# Patient Record
Sex: MALE | Race: OTHER | Employment: OTHER | URBAN - METROPOLITAN AREA
[De-identification: names, ages, dates, MRNs, and addresses within clinical notes are randomized per-mention and may not be internally consistent; named-entity substitution may affect disease eponyms.]

---

## 2017-01-23 ENCOUNTER — OFFICE VISIT (OUTPATIENT)
Dept: UROLOGY | Age: 72
End: 2017-01-23

## 2017-01-23 VITALS
WEIGHT: 190 LBS | DIASTOLIC BLOOD PRESSURE: 90 MMHG | HEART RATE: 77 BPM | HEIGHT: 68 IN | SYSTOLIC BLOOD PRESSURE: 180 MMHG | BODY MASS INDEX: 28.79 KG/M2

## 2017-01-23 DIAGNOSIS — R97.20 RISING PSA LEVEL: Primary | ICD-10-CM

## 2017-01-23 PROCEDURE — 99214 OFFICE O/P EST MOD 30 MIN: CPT | Performed by: UROLOGY

## 2017-02-06 ENCOUNTER — HOSPITAL ENCOUNTER (OUTPATIENT)
Dept: PREADMISSION TESTING | Age: 72
Discharge: HOME | End: 2017-02-06
Payer: COMMERCIAL

## 2017-02-06 VITALS
SYSTOLIC BLOOD PRESSURE: 166 MMHG | HEART RATE: 81 BPM | TEMPERATURE: 98.3 F | OXYGEN SATURATION: 97 % | WEIGHT: 193.6 LBS | RESPIRATION RATE: 16 BRPM | BODY MASS INDEX: 31.12 KG/M2 | DIASTOLIC BLOOD PRESSURE: 83 MMHG | HEIGHT: 66 IN

## 2017-02-06 DIAGNOSIS — R97.20 ELEVATED PSA: Chronic | ICD-10-CM

## 2017-02-06 LAB
ANION GAP SERPL CALCULATED.3IONS-SCNC: 13 MEQ/L (ref 7–13)
BUN BLDV-MCNC: 26 MG/DL (ref 8–23)
CALCIUM SERPL-MCNC: 9.3 MG/DL (ref 8.6–10.2)
CHLORIDE BLD-SCNC: 102 MEQ/L (ref 98–107)
CO2: 21 MEQ/L (ref 22–29)
CREAT SERPL-MCNC: 0.98 MG/DL (ref 0.7–1.2)
GFR AFRICAN AMERICAN: >60
GFR NON-AFRICAN AMERICAN: >60
GLUCOSE BLD-MCNC: 194 MG/DL (ref 74–109)
HCT VFR BLD CALC: 41 % (ref 42–52)
HEMOGLOBIN: 13.6 G/DL (ref 14–18)
MCH RBC QN AUTO: 30 PG (ref 27–31.3)
MCHC RBC AUTO-ENTMCNC: 33.3 % (ref 33–37)
MCV RBC AUTO: 90.1 FL (ref 80–100)
PDW BLD-RTO: 13.4 % (ref 11.5–14.5)
PLATELET # BLD: 134 K/UL (ref 130–400)
POTASSIUM SERPL-SCNC: 5.1 MEQ/L (ref 3.5–5.1)
RBC # BLD: 4.55 M/UL (ref 4.7–6.1)
SODIUM BLD-SCNC: 136 MEQ/L (ref 132–144)
WBC # BLD: 5.9 K/UL (ref 4.8–10.8)

## 2017-02-06 PROCEDURE — 80048 BASIC METABOLIC PNL TOTAL CA: CPT

## 2017-02-06 PROCEDURE — 93005 ELECTROCARDIOGRAM TRACING: CPT

## 2017-02-06 PROCEDURE — 85027 COMPLETE CBC AUTOMATED: CPT

## 2017-02-06 RX ORDER — SODIUM CHLORIDE 0.9 % (FLUSH) 0.9 %
10 SYRINGE (ML) INJECTION PRN
Status: CANCELLED | OUTPATIENT
Start: 2017-02-06

## 2017-02-06 RX ORDER — SODIUM CHLORIDE, SODIUM LACTATE, POTASSIUM CHLORIDE, CALCIUM CHLORIDE 600; 310; 30; 20 MG/100ML; MG/100ML; MG/100ML; MG/100ML
INJECTION, SOLUTION INTRAVENOUS CONTINUOUS
Status: CANCELLED | OUTPATIENT
Start: 2017-02-06

## 2017-02-06 RX ORDER — SODIUM CHLORIDE 0.9 % (FLUSH) 0.9 %
10 SYRINGE (ML) INJECTION EVERY 12 HOURS SCHEDULED
Status: CANCELLED | OUTPATIENT
Start: 2017-02-06

## 2017-02-06 RX ORDER — LIDOCAINE HYDROCHLORIDE 10 MG/ML
1 INJECTION, SOLUTION EPIDURAL; INFILTRATION; INTRACAUDAL; PERINEURAL
Status: CANCELLED | OUTPATIENT
Start: 2017-02-06 | End: 2017-02-06

## 2017-02-07 ENCOUNTER — ANESTHESIA EVENT (OUTPATIENT)
Dept: OPERATING ROOM | Age: 72
End: 2017-02-07
Payer: COMMERCIAL

## 2017-02-08 ENCOUNTER — HOSPITAL ENCOUNTER (OUTPATIENT)
Age: 72
Setting detail: OUTPATIENT SURGERY
Discharge: HOME | End: 2017-02-08
Attending: UROLOGY | Admitting: UROLOGY
Payer: COMMERCIAL

## 2017-02-08 ENCOUNTER — HOSPITAL ENCOUNTER (OUTPATIENT)
Dept: ULTRASOUND IMAGING | Age: 72
Discharge: HOME | End: 2017-02-08
Attending: UROLOGY
Payer: COMMERCIAL

## 2017-02-08 ENCOUNTER — ANESTHESIA (OUTPATIENT)
Dept: OPERATING ROOM | Age: 72
End: 2017-02-08
Payer: COMMERCIAL

## 2017-02-08 ENCOUNTER — SURGERY (OUTPATIENT)
Age: 72
End: 2017-02-08

## 2017-02-08 VITALS
RESPIRATION RATE: 17 BRPM | DIASTOLIC BLOOD PRESSURE: 58 MMHG | OXYGEN SATURATION: 100 % | SYSTOLIC BLOOD PRESSURE: 100 MMHG

## 2017-02-08 VITALS
TEMPERATURE: 98 F | OXYGEN SATURATION: 99 % | SYSTOLIC BLOOD PRESSURE: 134 MMHG | HEART RATE: 73 BPM | DIASTOLIC BLOOD PRESSURE: 93 MMHG | RESPIRATION RATE: 16 BRPM

## 2017-02-08 DIAGNOSIS — R97.20 ELEVATED PSA: ICD-10-CM

## 2017-02-08 LAB
GLUCOSE BLD-MCNC: 173 MG/DL (ref 60–115)
GLUCOSE BLD-MCNC: 173 MG/DL (ref 60–115)
PERFORMED ON: ABNORMAL
PERFORMED ON: ABNORMAL

## 2017-02-08 PROCEDURE — 6360000002 HC RX W HCPCS: Performed by: UROLOGY

## 2017-02-08 PROCEDURE — 76942 ECHO GUIDE FOR BIOPSY: CPT | Performed by: UROLOGY

## 2017-02-08 PROCEDURE — 2500000003 HC RX 250 WO HCPCS: Performed by: NURSE ANESTHETIST, CERTIFIED REGISTERED

## 2017-02-08 PROCEDURE — 2500000003 HC RX 250 WO HCPCS: Performed by: STUDENT IN AN ORGANIZED HEALTH CARE EDUCATION/TRAINING PROGRAM

## 2017-02-08 PROCEDURE — 3600000012 HC SURGERY LEVEL 2 ADDTL 15MIN: Performed by: UROLOGY

## 2017-02-08 PROCEDURE — 3700000000 HC ANESTHESIA ATTENDED CARE: Performed by: UROLOGY

## 2017-02-08 PROCEDURE — 6360000002 HC RX W HCPCS: Performed by: NURSE ANESTHETIST, CERTIFIED REGISTERED

## 2017-02-08 PROCEDURE — 88305 TISSUE EXAM BY PATHOLOGIST: CPT

## 2017-02-08 PROCEDURE — 3600000002 HC SURGERY LEVEL 2 BASE: Performed by: UROLOGY

## 2017-02-08 PROCEDURE — 2580000003 HC RX 258: Performed by: STUDENT IN AN ORGANIZED HEALTH CARE EDUCATION/TRAINING PROGRAM

## 2017-02-08 PROCEDURE — 76872 US TRANSRECTAL: CPT

## 2017-02-08 PROCEDURE — 7100000010 HC PHASE II RECOVERY - FIRST 15 MIN: Performed by: UROLOGY

## 2017-02-08 PROCEDURE — 7100000000 HC PACU RECOVERY - FIRST 15 MIN: Performed by: UROLOGY

## 2017-02-08 PROCEDURE — 7100000011 HC PHASE II RECOVERY - ADDTL 15 MIN: Performed by: UROLOGY

## 2017-02-08 PROCEDURE — 55700 US PROSTATE NEEDLE PUNCH: CPT

## 2017-02-08 PROCEDURE — 3700000001 HC ADD 15 MINUTES (ANESTHESIA): Performed by: UROLOGY

## 2017-02-08 PROCEDURE — 55700 PR BIOPSY OF PROSTATE,NEEDLE/PUNCH: CPT | Performed by: UROLOGY

## 2017-02-08 PROCEDURE — 2580000003 HC RX 258: Performed by: UROLOGY

## 2017-02-08 PROCEDURE — 76872 US TRANSRECTAL: CPT | Performed by: UROLOGY

## 2017-02-08 RX ORDER — PROPOFOL 10 MG/ML
INJECTION, EMULSION INTRAVENOUS CONTINUOUS PRN
Status: DISCONTINUED | OUTPATIENT
Start: 2017-02-08 | End: 2017-02-08 | Stop reason: SDUPTHER

## 2017-02-08 RX ORDER — ONDANSETRON 2 MG/ML
4 INJECTION INTRAMUSCULAR; INTRAVENOUS
Status: DISCONTINUED | OUTPATIENT
Start: 2017-02-08 | End: 2017-02-08 | Stop reason: HOSPADM

## 2017-02-08 RX ORDER — SODIUM CHLORIDE 0.9 % (FLUSH) 0.9 %
10 SYRINGE (ML) INJECTION EVERY 12 HOURS SCHEDULED
Status: DISCONTINUED | OUTPATIENT
Start: 2017-02-08 | End: 2017-02-08 | Stop reason: HOSPADM

## 2017-02-08 RX ORDER — LIDOCAINE HYDROCHLORIDE 10 MG/ML
1 INJECTION, SOLUTION EPIDURAL; INFILTRATION; INTRACAUDAL; PERINEURAL
Status: COMPLETED | OUTPATIENT
Start: 2017-02-08 | End: 2017-02-08

## 2017-02-08 RX ORDER — PROPOFOL 10 MG/ML
INJECTION, EMULSION INTRAVENOUS PRN
Status: DISCONTINUED | OUTPATIENT
Start: 2017-02-08 | End: 2017-02-08 | Stop reason: SDUPTHER

## 2017-02-08 RX ORDER — SODIUM CHLORIDE, SODIUM LACTATE, POTASSIUM CHLORIDE, CALCIUM CHLORIDE 600; 310; 30; 20 MG/100ML; MG/100ML; MG/100ML; MG/100ML
INJECTION, SOLUTION INTRAVENOUS CONTINUOUS
Status: DISCONTINUED | OUTPATIENT
Start: 2017-02-08 | End: 2017-02-08 | Stop reason: HOSPADM

## 2017-02-08 RX ORDER — FENTANYL CITRATE 50 UG/ML
50 INJECTION, SOLUTION INTRAMUSCULAR; INTRAVENOUS EVERY 5 MIN PRN
Status: DISCONTINUED | OUTPATIENT
Start: 2017-02-08 | End: 2017-02-08 | Stop reason: HOSPADM

## 2017-02-08 RX ORDER — SODIUM CHLORIDE 0.9 % (FLUSH) 0.9 %
10 SYRINGE (ML) INJECTION PRN
Status: DISCONTINUED | OUTPATIENT
Start: 2017-02-08 | End: 2017-02-08 | Stop reason: HOSPADM

## 2017-02-08 RX ORDER — FENTANYL CITRATE 50 UG/ML
INJECTION, SOLUTION INTRAMUSCULAR; INTRAVENOUS PRN
Status: DISCONTINUED | OUTPATIENT
Start: 2017-02-08 | End: 2017-02-08 | Stop reason: SDUPTHER

## 2017-02-08 RX ORDER — LIDOCAINE HYDROCHLORIDE 20 MG/ML
INJECTION, SOLUTION EPIDURAL; INFILTRATION; INTRACAUDAL; PERINEURAL PRN
Status: DISCONTINUED | OUTPATIENT
Start: 2017-02-08 | End: 2017-02-08 | Stop reason: SDUPTHER

## 2017-02-08 RX ORDER — PROMETHAZINE HYDROCHLORIDE 25 MG/ML
25 INJECTION, SOLUTION INTRAMUSCULAR; INTRAVENOUS
Status: DISCONTINUED | OUTPATIENT
Start: 2017-02-08 | End: 2017-02-08 | Stop reason: HOSPADM

## 2017-02-08 RX ADMIN — FENTANYL CITRATE 25 MCG: 50 INJECTION, SOLUTION INTRAMUSCULAR; INTRAVENOUS at 08:25

## 2017-02-08 RX ADMIN — PROPOFOL 100 MCG/KG/MIN: 10 INJECTION, EMULSION INTRAVENOUS at 08:30

## 2017-02-08 RX ADMIN — GENTAMICIN SULFATE 120 MG: 40 INJECTION, SOLUTION INTRAMUSCULAR; INTRAVENOUS at 07:58

## 2017-02-08 RX ADMIN — FENTANYL CITRATE 25 MCG: 50 INJECTION, SOLUTION INTRAMUSCULAR; INTRAVENOUS at 08:29

## 2017-02-08 RX ADMIN — PROPOFOL 20 MG: 10 INJECTION, EMULSION INTRAVENOUS at 08:32

## 2017-02-08 RX ADMIN — PROPOFOL 140 MG: 10 INJECTION, EMULSION INTRAVENOUS at 08:29

## 2017-02-08 RX ADMIN — SODIUM CHLORIDE, POTASSIUM CHLORIDE, SODIUM LACTATE AND CALCIUM CHLORIDE: 600; 310; 30; 20 INJECTION, SOLUTION INTRAVENOUS at 07:58

## 2017-02-08 RX ADMIN — LIDOCAINE HYDROCHLORIDE 60 MG: 20 INJECTION, SOLUTION EPIDURAL; INFILTRATION; INTRACAUDAL; PERINEURAL at 08:29

## 2017-02-08 RX ADMIN — PROPOFOL 20 MG: 10 INJECTION, EMULSION INTRAVENOUS at 08:30

## 2017-02-08 RX ADMIN — LIDOCAINE HYDROCHLORIDE 0.1 ML: 10 INJECTION, SOLUTION EPIDURAL; INFILTRATION; INTRACAUDAL; PERINEURAL at 07:57

## 2017-02-08 ASSESSMENT — PAIN - FUNCTIONAL ASSESSMENT: PAIN_FUNCTIONAL_ASSESSMENT: 0-10

## 2017-02-08 ASSESSMENT — PAIN DESCRIPTION - PAIN TYPE
TYPE: SURGICAL PAIN
TYPE: SURGICAL PAIN

## 2017-02-08 ASSESSMENT — PAIN SCALES - GENERAL
PAINLEVEL_OUTOF10: 0
PAINLEVEL_OUTOF10: 2
PAINLEVEL_OUTOF10: 2
PAINLEVEL_OUTOF10: 0

## 2017-02-08 ASSESSMENT — PAIN DESCRIPTION - LOCATION
LOCATION: RECTUM
LOCATION: RECTUM

## 2017-02-08 ASSESSMENT — ENCOUNTER SYMPTOMS: STRIDOR: 0

## 2017-02-09 LAB
EKG ATRIAL RATE: 67 BPM
EKG P AXIS: 59 DEGREES
EKG P-R INTERVAL: 166 MS
EKG Q-T INTERVAL: 392 MS
EKG QRS DURATION: 96 MS
EKG QTC CALCULATION (BAZETT): 414 MS
EKG R AXIS: -10 DEGREES
EKG T AXIS: 28 DEGREES
EKG VENTRICULAR RATE: 67 BPM

## 2017-02-10 DIAGNOSIS — E11.69 TYPE 2 DIABETES MELLITUS WITH OTHER SPECIFIED COMPLICATION (HCC): ICD-10-CM

## 2017-02-10 LAB
ANION GAP SERPL CALCULATED.3IONS-SCNC: 10 MEQ/L (ref 7–13)
BUN BLDV-MCNC: 19 MG/DL (ref 8–23)
CALCIUM SERPL-MCNC: 9.4 MG/DL (ref 8.6–10.2)
CHLORIDE BLD-SCNC: 105 MEQ/L (ref 98–107)
CO2: 26 MEQ/L (ref 22–29)
CREAT SERPL-MCNC: 0.99 MG/DL (ref 0.7–1.2)
CREATININE URINE: 149.1 MG/DL
GFR AFRICAN AMERICAN: >60
GFR NON-AFRICAN AMERICAN: >60
GLUCOSE BLD-MCNC: 156 MG/DL (ref 74–109)
HBA1C MFR BLD: 8.5 % (ref 4.8–5.9)
MICROALBUMIN UR-MCNC: 9 MG/DL
MICROALBUMIN/CREAT UR-RTO: 60.4 MG/G (ref 0–30)
POTASSIUM SERPL-SCNC: 4.8 MEQ/L (ref 3.5–5.1)
SODIUM BLD-SCNC: 141 MEQ/L (ref 132–144)

## 2017-02-17 ENCOUNTER — OFFICE VISIT (OUTPATIENT)
Dept: SURGERY | Age: 72
End: 2017-02-17

## 2017-02-17 VITALS
DIASTOLIC BLOOD PRESSURE: 66 MMHG | SYSTOLIC BLOOD PRESSURE: 136 MMHG | WEIGHT: 195 LBS | BODY MASS INDEX: 31.34 KG/M2 | HEIGHT: 66 IN | HEART RATE: 76 BPM

## 2017-02-17 PROCEDURE — 99213 OFFICE O/P EST LOW 20 MIN: CPT | Performed by: INTERNAL MEDICINE

## 2017-02-20 ENCOUNTER — OFFICE VISIT (OUTPATIENT)
Dept: UROLOGY | Age: 72
End: 2017-02-20

## 2017-02-20 VITALS
DIASTOLIC BLOOD PRESSURE: 86 MMHG | HEART RATE: 82 BPM | WEIGHT: 193 LBS | BODY MASS INDEX: 29.25 KG/M2 | HEIGHT: 68 IN | SYSTOLIC BLOOD PRESSURE: 142 MMHG

## 2017-02-20 DIAGNOSIS — C61 PROSTATE CANCER (HCC): Primary | ICD-10-CM

## 2017-02-20 PROCEDURE — 99214 OFFICE O/P EST MOD 30 MIN: CPT | Performed by: UROLOGY

## 2017-02-21 ENCOUNTER — TELEPHONE (OUTPATIENT)
Dept: UROLOGY | Age: 72
End: 2017-02-21

## 2017-03-02 ENCOUNTER — HOSPITAL ENCOUNTER (OUTPATIENT)
Dept: NUCLEAR MEDICINE | Age: 72
Discharge: HOME | End: 2017-03-02
Payer: COMMERCIAL

## 2017-03-02 ENCOUNTER — HOSPITAL ENCOUNTER (OUTPATIENT)
Dept: CT IMAGING | Age: 72
Discharge: HOME | End: 2017-03-02
Payer: COMMERCIAL

## 2017-03-02 ENCOUNTER — OFFICE VISIT (OUTPATIENT)
Dept: UROLOGY | Age: 72
End: 2017-03-02

## 2017-03-02 VITALS
HEART RATE: 67 BPM | SYSTOLIC BLOOD PRESSURE: 160 MMHG | BODY MASS INDEX: 29.82 KG/M2 | WEIGHT: 190 LBS | DIASTOLIC BLOOD PRESSURE: 90 MMHG | HEIGHT: 67 IN

## 2017-03-02 VITALS — DIASTOLIC BLOOD PRESSURE: 84 MMHG | HEART RATE: 74 BPM | SYSTOLIC BLOOD PRESSURE: 159 MMHG | RESPIRATION RATE: 16 BRPM

## 2017-03-02 DIAGNOSIS — C61 PROSTATE CANCER (HCC): Primary | ICD-10-CM

## 2017-03-02 DIAGNOSIS — C61 PROSTATE CANCER (HCC): ICD-10-CM

## 2017-03-02 PROCEDURE — 6360000004 HC RX CONTRAST MEDICATION: Performed by: UROLOGY

## 2017-03-02 PROCEDURE — 3430000000 HC RX DIAGNOSTIC RADIOPHARMACEUTICAL: Performed by: UROLOGY

## 2017-03-02 PROCEDURE — A9503 TC99M MEDRONATE: HCPCS | Performed by: UROLOGY

## 2017-03-02 PROCEDURE — 78306 BONE IMAGING WHOLE BODY: CPT

## 2017-03-02 PROCEDURE — 74178 CT ABD&PLV WO CNTR FLWD CNTR: CPT

## 2017-03-02 PROCEDURE — 99213 OFFICE O/P EST LOW 20 MIN: CPT | Performed by: UROLOGY

## 2017-03-02 RX ORDER — SODIUM CHLORIDE 0.9 % (FLUSH) 0.9 %
10 SYRINGE (ML) INJECTION PRN
Status: DISCONTINUED | OUTPATIENT
Start: 2017-03-02 | End: 2017-03-05 | Stop reason: HOSPADM

## 2017-03-02 RX ORDER — TC 99M MEDRONATE 20 MG/10ML
25 INJECTION, POWDER, LYOPHILIZED, FOR SOLUTION INTRAVENOUS
Status: COMPLETED | OUTPATIENT
Start: 2017-03-02 | End: 2017-03-02

## 2017-03-02 RX ADMIN — IOPAMIDOL 100 ML: 755 INJECTION, SOLUTION INTRAVENOUS at 08:30

## 2017-03-02 RX ADMIN — Medication 29 MILLICURIE: at 08:45

## 2017-03-04 ENCOUNTER — HOSPITAL ENCOUNTER (OUTPATIENT)
Dept: LAB | Age: 72
Discharge: HOME | End: 2017-03-04
Payer: COMMERCIAL

## 2017-03-04 LAB
GFR AFRICAN AMERICAN: >60
GFR NON-AFRICAN AMERICAN: >60
PERFORMED ON: NORMAL
POC CREATININE: 1 MG/DL (ref 0.8–1.3)
POC SAMPLE TYPE: NORMAL

## 2017-03-06 ENCOUNTER — OFFICE VISIT (OUTPATIENT)
Dept: UROLOGY | Age: 72
End: 2017-03-06

## 2017-03-06 VITALS
HEIGHT: 67 IN | BODY MASS INDEX: 29.82 KG/M2 | WEIGHT: 190 LBS | SYSTOLIC BLOOD PRESSURE: 171 MMHG | HEART RATE: 79 BPM | DIASTOLIC BLOOD PRESSURE: 90 MMHG

## 2017-03-06 DIAGNOSIS — C61 PROSTATE CANCER (HCC): Primary | ICD-10-CM

## 2017-03-06 PROCEDURE — 99213 OFFICE O/P EST LOW 20 MIN: CPT | Performed by: UROLOGY

## 2017-03-23 ENCOUNTER — HOSPITAL ENCOUNTER (OUTPATIENT)
Dept: RADIATION ONCOLOGY | Age: 72
Discharge: HOME | End: 2017-03-23
Payer: COMMERCIAL

## 2017-03-23 VITALS
DIASTOLIC BLOOD PRESSURE: 84 MMHG | OXYGEN SATURATION: 97 % | HEIGHT: 66 IN | TEMPERATURE: 97.7 F | SYSTOLIC BLOOD PRESSURE: 179 MMHG | RESPIRATION RATE: 16 BRPM | BODY MASS INDEX: 31.5 KG/M2 | HEART RATE: 64 BPM | WEIGHT: 196 LBS

## 2017-03-23 PROCEDURE — 99213 OFFICE O/P EST LOW 20 MIN: CPT | Performed by: RADIOLOGY

## 2017-03-27 ENCOUNTER — OFFICE VISIT (OUTPATIENT)
Dept: UROLOGY | Age: 72
End: 2017-03-27

## 2017-03-27 VITALS
HEART RATE: 72 BPM | BODY MASS INDEX: 30.76 KG/M2 | WEIGHT: 196 LBS | DIASTOLIC BLOOD PRESSURE: 85 MMHG | HEIGHT: 67 IN | SYSTOLIC BLOOD PRESSURE: 140 MMHG

## 2017-03-27 DIAGNOSIS — C61 PROSTATE CA (HCC): Primary | ICD-10-CM

## 2017-03-27 PROCEDURE — 99214 OFFICE O/P EST MOD 30 MIN: CPT | Performed by: UROLOGY

## 2017-03-27 PROCEDURE — 96402 CHEMO HORMON ANTINEOPL SQ/IM: CPT | Performed by: UROLOGY

## 2017-04-28 ENCOUNTER — HOSPITAL ENCOUNTER (OUTPATIENT)
Dept: RADIATION ONCOLOGY | Age: 72
Discharge: HOME | End: 2017-04-28
Payer: COMMERCIAL

## 2017-04-28 PROCEDURE — 49411 INS MARK ABD/PEL FOR RT PERQ: CPT | Performed by: RADIOLOGY

## 2017-04-28 PROCEDURE — A4648 IMPLANTABLE TISSUE MARKER: HCPCS

## 2017-04-28 PROCEDURE — 99213 OFFICE O/P EST LOW 20 MIN: CPT | Performed by: RADIOLOGY

## 2017-04-28 RX ORDER — CIPROFLOXACIN 500 MG/1
500 TABLET, FILM COATED ORAL 2 TIMES DAILY
COMMUNITY
Start: 2017-04-28 | End: 2017-05-05

## 2017-05-09 ENCOUNTER — HOSPITAL ENCOUNTER (OUTPATIENT)
Dept: RADIATION ONCOLOGY | Age: 72
Discharge: HOME | End: 2017-05-09
Payer: COMMERCIAL

## 2017-05-09 ENCOUNTER — HOSPITAL ENCOUNTER (OUTPATIENT)
Dept: MRI IMAGING | Age: 72
Discharge: HOME | End: 2017-05-09
Payer: COMMERCIAL

## 2017-05-09 VITALS — HEIGHT: 70 IN | WEIGHT: 197 LBS | BODY MASS INDEX: 28.2 KG/M2

## 2017-05-09 DIAGNOSIS — C61 PROSTATE CANCER (HCC): ICD-10-CM

## 2017-05-09 PROCEDURE — 72197 MRI PELVIS W/O & W/DYE: CPT

## 2017-05-09 PROCEDURE — 6360000004 HC RX CONTRAST MEDICATION: Performed by: RADIOLOGY

## 2017-05-09 PROCEDURE — 77290 THER RAD SIMULAJ FIELD CPLX: CPT | Performed by: RADIOLOGY

## 2017-05-09 PROCEDURE — 77334 RADIATION TREATMENT AID(S): CPT | Performed by: RADIOLOGY

## 2017-05-09 PROCEDURE — A9579 GAD-BASE MR CONTRAST NOS,1ML: HCPCS | Performed by: RADIOLOGY

## 2017-05-09 PROCEDURE — 99214 OFFICE O/P EST MOD 30 MIN: CPT | Performed by: RADIOLOGY

## 2017-05-09 RX ADMIN — GADOVERSETAMIDE 20 ML: 0.5 INJECTION, SOLUTION INTRAVENOUS at 12:52

## 2017-05-10 PROCEDURE — 77399 UNLISTED PX MED RADJ PHYSICS: CPT | Performed by: RADIOLOGY

## 2017-05-11 DIAGNOSIS — C61 PROSTATE CA (HCC): ICD-10-CM

## 2017-05-11 LAB
ANION GAP SERPL CALCULATED.3IONS-SCNC: 14 MEQ/L (ref 7–13)
BUN BLDV-MCNC: 24 MG/DL (ref 8–23)
CALCIUM SERPL-MCNC: 9.5 MG/DL (ref 8.6–10.2)
CHLORIDE BLD-SCNC: 99 MEQ/L (ref 98–107)
CO2: 24 MEQ/L (ref 22–29)
CREAT SERPL-MCNC: 0.91 MG/DL (ref 0.7–1.2)
CREATININE URINE: 58.1 MG/DL
GFR AFRICAN AMERICAN: >60
GFR NON-AFRICAN AMERICAN: >60
GLUCOSE BLD-MCNC: 98 MG/DL (ref 74–109)
HBA1C MFR BLD: 8.2 % (ref 4.8–5.9)
MICROALBUMIN UR-MCNC: 6 MG/DL
MICROALBUMIN/CREAT UR-RTO: 103.3 MG/G (ref 0–30)
POTASSIUM SERPL-SCNC: 4.5 MEQ/L (ref 3.5–5.1)
PROSTATE SPECIFIC ANTIGEN: 0.77 NG/ML (ref 0–6.22)
SODIUM BLD-SCNC: 137 MEQ/L (ref 132–144)

## 2017-05-18 ENCOUNTER — HOSPITAL ENCOUNTER (OUTPATIENT)
Dept: RADIATION ONCOLOGY | Age: 72
Discharge: HOME | End: 2017-05-18
Payer: COMMERCIAL

## 2017-05-18 PROCEDURE — 77338 DESIGN MLC DEVICE FOR IMRT: CPT | Performed by: RADIOLOGY

## 2017-05-18 PROCEDURE — 77300 RADIATION THERAPY DOSE PLAN: CPT | Performed by: RADIOLOGY

## 2017-05-18 PROCEDURE — 77301 RADIOTHERAPY DOSE PLAN IMRT: CPT | Performed by: RADIOLOGY

## 2017-05-22 ENCOUNTER — HOSPITAL ENCOUNTER (OUTPATIENT)
Dept: RADIATION ONCOLOGY | Age: 72
Discharge: HOME | End: 2017-05-22
Payer: COMMERCIAL

## 2017-05-23 PROCEDURE — 77387 GUIDANCE FOR RADJ TX DLVR: CPT | Performed by: RADIOLOGY

## 2017-05-23 PROCEDURE — 77280 THER RAD SIMULAJ FIELD SMPL: CPT | Performed by: RADIOLOGY

## 2017-05-24 PROCEDURE — 77385 HC NTSTY MODUL RAD TX DLVR SMPL: CPT | Performed by: RADIOLOGY

## 2017-05-25 ENCOUNTER — OFFICE VISIT (OUTPATIENT)
Dept: SURGERY | Age: 72
End: 2017-05-25

## 2017-05-25 VITALS
WEIGHT: 194 LBS | DIASTOLIC BLOOD PRESSURE: 94 MMHG | HEIGHT: 66 IN | SYSTOLIC BLOOD PRESSURE: 183 MMHG | HEART RATE: 83 BPM | BODY MASS INDEX: 31.18 KG/M2

## 2017-05-25 LAB — GLUCOSE BLD-MCNC: 127 MG/DL

## 2017-05-25 PROCEDURE — 99213 OFFICE O/P EST LOW 20 MIN: CPT | Performed by: INTERNAL MEDICINE

## 2017-05-25 PROCEDURE — 82962 GLUCOSE BLOOD TEST: CPT | Performed by: INTERNAL MEDICINE

## 2017-05-25 PROCEDURE — 77385 HC NTSTY MODUL RAD TX DLVR SMPL: CPT | Performed by: RADIOLOGY

## 2017-05-25 RX ORDER — SIMVASTATIN 40 MG
40 TABLET ORAL NIGHTLY
Qty: 90 TABLET | Refills: 3 | Status: SHIPPED | OUTPATIENT
Start: 2017-05-25 | End: 2018-05-29 | Stop reason: SDUPTHER

## 2017-05-25 RX ORDER — BICALUTAMIDE 50 MG/1
50 TABLET, FILM COATED ORAL DAILY
Qty: 90 TABLET | Refills: 3
Start: 2017-05-25 | End: 2017-10-18

## 2017-05-26 PROCEDURE — 77385 HC NTSTY MODUL RAD TX DLVR SMPL: CPT | Performed by: RADIOLOGY

## 2017-05-30 ENCOUNTER — HOSPITAL ENCOUNTER (OUTPATIENT)
Dept: RADIATION ONCOLOGY | Age: 72
Discharge: HOME | End: 2017-05-30
Payer: COMMERCIAL

## 2017-05-30 PROCEDURE — 99212 OFFICE O/P EST SF 10 MIN: CPT | Performed by: RADIOLOGY

## 2017-05-30 PROCEDURE — 77385 HC NTSTY MODUL RAD TX DLVR SMPL: CPT | Performed by: RADIOLOGY

## 2017-05-31 PROCEDURE — 77385 HC NTSTY MODUL RAD TX DLVR SMPL: CPT | Performed by: RADIOLOGY

## 2017-05-31 PROCEDURE — 77336 RADIATION PHYSICS CONSULT: CPT | Performed by: RADIOLOGY

## 2017-06-01 PROCEDURE — 77385 HC NTSTY MODUL RAD TX DLVR SMPL: CPT | Performed by: RADIOLOGY

## 2017-06-02 PROCEDURE — 77385 HC NTSTY MODUL RAD TX DLVR SMPL: CPT | Performed by: RADIOLOGY

## 2017-06-05 ENCOUNTER — HOSPITAL ENCOUNTER (OUTPATIENT)
Dept: RADIATION ONCOLOGY | Age: 72
Discharge: HOME | End: 2017-06-05
Payer: COMMERCIAL

## 2017-06-05 PROCEDURE — 99212 OFFICE O/P EST SF 10 MIN: CPT | Performed by: RADIOLOGY

## 2017-06-05 PROCEDURE — 77385 HC NTSTY MODUL RAD TX DLVR SMPL: CPT | Performed by: RADIOLOGY

## 2017-06-05 RX ORDER — BLOOD-GLUCOSE METER
KIT MISCELLANEOUS
Qty: 100 STRIP | Refills: 5 | Status: SHIPPED | OUTPATIENT
Start: 2017-06-05 | End: 2017-06-05 | Stop reason: SDUPTHER

## 2017-06-05 RX ORDER — LANCETS 28 GAUGE
EACH MISCELLANEOUS
Qty: 100 EACH | Refills: 5 | Status: SHIPPED | OUTPATIENT
Start: 2017-06-05 | End: 2019-06-21 | Stop reason: SINTOL

## 2017-06-05 RX ORDER — BLOOD-GLUCOSE METER
KIT MISCELLANEOUS
Qty: 300 STRIP | Refills: 5 | Status: SHIPPED | OUTPATIENT
Start: 2017-06-05 | End: 2019-06-21

## 2017-06-06 PROCEDURE — 77385 HC NTSTY MODUL RAD TX DLVR SMPL: CPT | Performed by: RADIOLOGY

## 2017-06-07 PROCEDURE — 77385 HC NTSTY MODUL RAD TX DLVR SMPL: CPT | Performed by: RADIOLOGY

## 2017-06-07 PROCEDURE — 77336 RADIATION PHYSICS CONSULT: CPT | Performed by: RADIOLOGY

## 2017-06-08 PROCEDURE — 77385 HC NTSTY MODUL RAD TX DLVR SMPL: CPT | Performed by: RADIOLOGY

## 2017-06-09 PROCEDURE — 77385 HC NTSTY MODUL RAD TX DLVR SMPL: CPT | Performed by: RADIOLOGY

## 2017-06-12 ENCOUNTER — HOSPITAL ENCOUNTER (OUTPATIENT)
Dept: RADIATION ONCOLOGY | Age: 72
Discharge: HOME | End: 2017-06-12
Payer: COMMERCIAL

## 2017-06-12 PROCEDURE — 77385 HC NTSTY MODUL RAD TX DLVR SMPL: CPT | Performed by: RADIOLOGY

## 2017-06-13 PROCEDURE — 77385 HC NTSTY MODUL RAD TX DLVR SMPL: CPT | Performed by: RADIOLOGY

## 2017-06-13 PROCEDURE — 99212 OFFICE O/P EST SF 10 MIN: CPT | Performed by: RADIOLOGY

## 2017-06-14 PROCEDURE — 77385 HC NTSTY MODUL RAD TX DLVR SMPL: CPT | Performed by: RADIOLOGY

## 2017-06-14 PROCEDURE — 77336 RADIATION PHYSICS CONSULT: CPT | Performed by: RADIOLOGY

## 2017-06-15 PROCEDURE — 77300 RADIATION THERAPY DOSE PLAN: CPT | Performed by: RADIOLOGY

## 2017-06-15 PROCEDURE — 77385 HC NTSTY MODUL RAD TX DLVR SMPL: CPT | Performed by: RADIOLOGY

## 2017-06-16 PROCEDURE — 77385 HC NTSTY MODUL RAD TX DLVR SMPL: CPT | Performed by: RADIOLOGY

## 2017-06-19 ENCOUNTER — HOSPITAL ENCOUNTER (OUTPATIENT)
Dept: RADIATION ONCOLOGY | Age: 72
Discharge: HOME | End: 2017-06-19
Payer: COMMERCIAL

## 2017-06-19 PROCEDURE — 77385 HC NTSTY MODUL RAD TX DLVR SMPL: CPT | Performed by: RADIOLOGY

## 2017-06-20 PROCEDURE — 99212 OFFICE O/P EST SF 10 MIN: CPT | Performed by: RADIOLOGY

## 2017-06-20 PROCEDURE — 77385 HC NTSTY MODUL RAD TX DLVR SMPL: CPT | Performed by: RADIOLOGY

## 2017-06-21 PROCEDURE — 77385 HC NTSTY MODUL RAD TX DLVR SMPL: CPT | Performed by: RADIOLOGY

## 2017-06-21 PROCEDURE — 77336 RADIATION PHYSICS CONSULT: CPT | Performed by: RADIOLOGY

## 2017-06-22 PROCEDURE — 77385 HC NTSTY MODUL RAD TX DLVR SMPL: CPT | Performed by: RADIOLOGY

## 2017-06-23 PROCEDURE — 77385 HC NTSTY MODUL RAD TX DLVR SMPL: CPT | Performed by: RADIOLOGY

## 2017-06-26 ENCOUNTER — HOSPITAL ENCOUNTER (OUTPATIENT)
Dept: RADIATION ONCOLOGY | Age: 72
Discharge: HOME | End: 2017-06-26
Payer: COMMERCIAL

## 2017-06-26 PROCEDURE — 77385 HC NTSTY MODUL RAD TX DLVR SMPL: CPT | Performed by: RADIOLOGY

## 2017-06-27 PROCEDURE — 99212 OFFICE O/P EST SF 10 MIN: CPT | Performed by: RADIOLOGY

## 2017-06-27 PROCEDURE — 77385 HC NTSTY MODUL RAD TX DLVR SMPL: CPT | Performed by: RADIOLOGY

## 2017-06-28 PROCEDURE — 77385 HC NTSTY MODUL RAD TX DLVR SMPL: CPT | Performed by: RADIOLOGY

## 2017-06-28 PROCEDURE — 77336 RADIATION PHYSICS CONSULT: CPT | Performed by: RADIOLOGY

## 2017-06-29 PROCEDURE — 77385 HC NTSTY MODUL RAD TX DLVR SMPL: CPT | Performed by: RADIOLOGY

## 2017-06-30 PROCEDURE — 77385 HC NTSTY MODUL RAD TX DLVR SMPL: CPT | Performed by: RADIOLOGY

## 2017-07-03 ENCOUNTER — HOSPITAL ENCOUNTER (OUTPATIENT)
Dept: RADIATION ONCOLOGY | Age: 72
Discharge: HOME | End: 2017-07-03
Payer: COMMERCIAL

## 2017-07-03 PROCEDURE — 99212 OFFICE O/P EST SF 10 MIN: CPT | Performed by: RADIOLOGY

## 2017-07-03 PROCEDURE — 77385 HC NTSTY MODUL RAD TX DLVR SMPL: CPT | Performed by: RADIOLOGY

## 2017-07-03 RX ORDER — TAMSULOSIN HYDROCHLORIDE 0.4 MG/1
0.4 CAPSULE ORAL 2 TIMES DAILY
COMMUNITY
End: 2018-05-08 | Stop reason: ALTCHOICE

## 2017-07-05 PROCEDURE — 77385 HC NTSTY MODUL RAD TX DLVR SMPL: CPT | Performed by: RADIOLOGY

## 2017-07-06 PROCEDURE — 77336 RADIATION PHYSICS CONSULT: CPT | Performed by: RADIOLOGY

## 2017-07-06 PROCEDURE — 77385 HC NTSTY MODUL RAD TX DLVR SMPL: CPT | Performed by: RADIOLOGY

## 2017-07-07 PROCEDURE — 77385 HC NTSTY MODUL RAD TX DLVR SMPL: CPT | Performed by: RADIOLOGY

## 2017-07-10 ENCOUNTER — HOSPITAL ENCOUNTER (OUTPATIENT)
Dept: RADIATION ONCOLOGY | Age: 72
Discharge: HOME | End: 2017-07-10
Payer: COMMERCIAL

## 2017-07-10 PROCEDURE — 77385 HC NTSTY MODUL RAD TX DLVR SMPL: CPT | Performed by: RADIOLOGY

## 2017-07-11 PROCEDURE — 77385 HC NTSTY MODUL RAD TX DLVR SMPL: CPT | Performed by: RADIOLOGY

## 2017-07-11 PROCEDURE — 99213 OFFICE O/P EST LOW 20 MIN: CPT | Performed by: RADIOLOGY

## 2017-07-12 PROCEDURE — 77385 HC NTSTY MODUL RAD TX DLVR SMPL: CPT | Performed by: RADIOLOGY

## 2017-07-13 PROCEDURE — 77336 RADIATION PHYSICS CONSULT: CPT | Performed by: RADIOLOGY

## 2017-07-13 PROCEDURE — 77385 HC NTSTY MODUL RAD TX DLVR SMPL: CPT | Performed by: RADIOLOGY

## 2017-07-14 PROCEDURE — 77385 HC NTSTY MODUL RAD TX DLVR SMPL: CPT | Performed by: RADIOLOGY

## 2017-07-17 ENCOUNTER — HOSPITAL ENCOUNTER (OUTPATIENT)
Dept: RADIATION ONCOLOGY | Age: 72
Discharge: HOME | End: 2017-07-17
Payer: COMMERCIAL

## 2017-07-17 PROCEDURE — 77385 HC NTSTY MODUL RAD TX DLVR SMPL: CPT | Performed by: RADIOLOGY

## 2017-07-18 PROCEDURE — 99212 OFFICE O/P EST SF 10 MIN: CPT | Performed by: RADIOLOGY

## 2017-07-18 PROCEDURE — 77385 HC NTSTY MODUL RAD TX DLVR SMPL: CPT | Performed by: RADIOLOGY

## 2017-07-19 PROCEDURE — 77336 RADIATION PHYSICS CONSULT: CPT | Performed by: RADIOLOGY

## 2017-07-19 PROCEDURE — 77385 HC NTSTY MODUL RAD TX DLVR SMPL: CPT | Performed by: RADIOLOGY

## 2017-08-15 LAB
ANION GAP SERPL CALCULATED.3IONS-SCNC: 15 MEQ/L (ref 7–13)
BUN BLDV-MCNC: 26 MG/DL (ref 8–23)
CALCIUM SERPL-MCNC: 9.5 MG/DL (ref 8.6–10.2)
CHLORIDE BLD-SCNC: 103 MEQ/L (ref 98–107)
CO2: 26 MEQ/L (ref 22–29)
CREAT SERPL-MCNC: 0.8 MG/DL (ref 0.7–1.2)
CREATININE URINE: 122.7 MG/DL
GFR AFRICAN AMERICAN: >60
GFR NON-AFRICAN AMERICAN: >60
GLUCOSE BLD-MCNC: 111 MG/DL (ref 74–109)
HBA1C MFR BLD: 7.1 % (ref 4.8–5.9)
MICROALBUMIN UR-MCNC: 6.3 MG/DL
MICROALBUMIN/CREAT UR-RTO: 51.3 MG/G (ref 0–30)
POTASSIUM SERPL-SCNC: 4.7 MEQ/L (ref 3.5–5.1)
SODIUM BLD-SCNC: 144 MEQ/L (ref 132–144)

## 2017-08-25 ENCOUNTER — OFFICE VISIT (OUTPATIENT)
Dept: SURGERY | Age: 72
End: 2017-08-25

## 2017-08-25 VITALS
BODY MASS INDEX: 31.66 KG/M2 | SYSTOLIC BLOOD PRESSURE: 136 MMHG | WEIGHT: 197 LBS | HEART RATE: 77 BPM | HEIGHT: 66 IN | DIASTOLIC BLOOD PRESSURE: 88 MMHG

## 2017-08-25 LAB — GLUCOSE BLD-MCNC: 103 MG/DL

## 2017-08-25 PROCEDURE — 99213 OFFICE O/P EST LOW 20 MIN: CPT | Performed by: INTERNAL MEDICINE

## 2017-08-25 PROCEDURE — 82962 GLUCOSE BLOOD TEST: CPT | Performed by: INTERNAL MEDICINE

## 2017-08-25 RX ORDER — AMLODIPINE BESYLATE 5 MG/1
5 TABLET ORAL DAILY
COMMUNITY
End: 2017-11-17 | Stop reason: SDUPTHER

## 2017-09-18 ENCOUNTER — HOSPITAL ENCOUNTER (OUTPATIENT)
Dept: RADIATION ONCOLOGY | Age: 72
Discharge: HOME | End: 2017-09-18
Payer: COMMERCIAL

## 2017-09-18 PROCEDURE — 99212 OFFICE O/P EST SF 10 MIN: CPT | Performed by: RADIOLOGY

## 2017-09-26 LAB — PROSTATE SPECIFIC ANTIGEN: <0.01 NG/ML (ref 0–6.22)

## 2017-10-02 ENCOUNTER — OFFICE VISIT (OUTPATIENT)
Dept: UROLOGY | Age: 72
End: 2017-10-02

## 2017-10-02 VITALS
HEART RATE: 75 BPM | WEIGHT: 193 LBS | HEIGHT: 66 IN | BODY MASS INDEX: 31.02 KG/M2 | DIASTOLIC BLOOD PRESSURE: 84 MMHG | SYSTOLIC BLOOD PRESSURE: 132 MMHG

## 2017-10-02 DIAGNOSIS — C61 PROSTATE CA (HCC): Primary | ICD-10-CM

## 2017-10-02 PROCEDURE — 99214 OFFICE O/P EST MOD 30 MIN: CPT | Performed by: UROLOGY

## 2017-10-02 PROCEDURE — 96402 CHEMO HORMON ANTINEOPL SQ/IM: CPT | Performed by: UROLOGY

## 2017-10-02 NOTE — MR AVS SNAPSHOT
type 2 diabetes, stroke, gallstones, arthritis, sleep apnea, and certain cancers. BMI is not perfect. It may overestimate body fat in athletes and people who are more muscular. Even a small weight loss (between 5 and 10 percent of your current weight) by decreasing your calorie intake and becoming more physically active will help lower your risk of developing or worsening diseases associated with obesity. Learn more at: [email or web address]             Today's Medication Changes          These changes are accurate as of: 10/2/17  9:22 AM.  If you have any questions, ask your nurse or doctor. STOP taking these medications           lisinopril 40 MG tablet   Commonly known as:  PRINIVIL;ZESTRIL   Stopped by:  Bety Hinson MD               Your Current Medications Are              amLODIPine (NORVASC) 5 MG tablet Take 5 mg by mouth daily    NOVOFINE 32G X 6 MM MISC USE TWICE DAILY    tamsulosin (FLOMAX) 0.4 MG capsule Take 0.4 mg by mouth 2 times daily     FREESTYLE LANCETS MISC TEST DAILY    FREESTYLE LITE strip AS DIRECTED THREE TIMES DAILY    simvastatin (ZOCOR) 40 MG tablet Take 1 tablet by mouth nightly    bicalutamide (CASODEX) 50 MG chemo tablet Take 1 tablet by mouth daily    metFORMIN (GLUCOPHAGE) 1000 MG tablet TAKE 1 TABLET BY MOUTH TWICE DAILY WITH MEALS    insulin aspart protamine-insulin aspart (NOVOLOG MIX 70/30 FLEXPEN) (70-30) 100 UNIT/ML injection Inject 10 Units into the skin 2 times daily (with meals)    Blood Glucose Monitoring Suppl (FREESTYLE LITE) NEENA 1 Device by Does not apply route daily as needed    hydrochlorothiazide (HYDRODIURIL) 25 MG tablet Take 1 tablet by mouth daily. meloxicam (MOBIC) 7.5 MG tablet Take 7.5 mg by mouth 2 times daily     ranitidine (ZANTAC) 150 MG tablet Take 150 mg by mouth 2 times daily.       Allergies              Acetaminophen     Glipizide     Propoxyphene And Methadone [Methadone]

## 2017-10-02 NOTE — PROGRESS NOTES
Types: Cigarettes     Quit date: 1/3/2017    Smokeless tobacco: None      Comment: couple cigarettes a day    Alcohol use No      Comment: rarely- 1 beer monthly    Drug use: No    Sexual activity: Not Asked     Other Topics Concern    None     Social History Narrative     Family History   Problem Relation Age of Onset    Diabetes Mother      Current Outpatient Prescriptions   Medication Sig Dispense Refill    amLODIPine (NORVASC) 5 MG tablet Take 5 mg by mouth daily      NOVOFINE 32G X 6 MM MISC USE TWICE DAILY 100 each 3    tamsulosin (FLOMAX) 0.4 MG capsule Take 0.4 mg by mouth 2 times daily       FREESTYLE LANCETS MISC TEST DAILY 100 each 5    FREESTYLE LITE strip AS DIRECTED THREE TIMES DAILY 300 strip 5    simvastatin (ZOCOR) 40 MG tablet Take 1 tablet by mouth nightly 90 tablet 3    bicalutamide (CASODEX) 50 MG chemo tablet Take 1 tablet by mouth daily 90 tablet 3    metFORMIN (GLUCOPHAGE) 1000 MG tablet TAKE 1 TABLET BY MOUTH TWICE DAILY WITH MEALS 180 tablet 1    insulin aspart protamine-insulin aspart (NOVOLOG MIX 70/30 FLEXPEN) (70-30) 100 UNIT/ML injection Inject 10 Units into the skin 2 times daily (with meals)      Blood Glucose Monitoring Suppl (FREESTYLE LITE) NEENA 1 Device by Does not apply route daily as needed (Patient taking differently: 1 Device by Does not apply route 3 times daily DX: E11.65, IDDM) 1 Device 0    hydrochlorothiazide (HYDRODIURIL) 25 MG tablet Take 1 tablet by mouth daily. 30 tablet 03    meloxicam (MOBIC) 7.5 MG tablet Take 7.5 mg by mouth 2 times daily       ranitidine (ZANTAC) 150 MG tablet Take 150 mg by mouth 2 times daily. No current facility-administered medications for this visit.       Acetaminophen; Glipizide; and Propoxyphene and methadone [methadone]  All reviewed and verified by Dr Teresa Lewis on today's visit    PSA   Date Value Ref Range Status   09/26/2017 <0.01 0.00 - 6.22 ng/mL Final   05/11/2017 0.77 0.00 - 6.22 ng/mL Final

## 2017-10-03 DIAGNOSIS — N18.9 CKD (CHRONIC KIDNEY DISEASE): ICD-10-CM

## 2017-10-03 DIAGNOSIS — E11.43 DIABETIC AUTONOMIC NEUROPATHY ASSOCIATED WITH TYPE 2 DIABETES MELLITUS (HCC): ICD-10-CM

## 2017-10-03 DIAGNOSIS — J30.2 SEASONAL ALLERGIC RHINITIS, UNSPECIFIED ALLERGIC RHINITIS TRIGGER: ICD-10-CM

## 2017-10-03 DIAGNOSIS — M19.90 ARTHRITIS: ICD-10-CM

## 2017-10-03 RX ORDER — MELOXICAM 7.5 MG/1
7.5 TABLET ORAL 2 TIMES DAILY
Qty: 60 TABLET | Refills: 3 | Status: SHIPPED | OUTPATIENT
Start: 2017-10-03 | End: 2017-11-17 | Stop reason: ALTCHOICE

## 2017-10-03 RX ORDER — HYDROCHLOROTHIAZIDE 25 MG/1
25 TABLET ORAL DAILY
Qty: 30 TABLET | Refills: 3 | Status: SHIPPED | OUTPATIENT
Start: 2017-10-03 | End: 2018-02-16 | Stop reason: SDUPTHER

## 2017-10-18 ENCOUNTER — HOSPITAL ENCOUNTER (OUTPATIENT)
Dept: RADIATION ONCOLOGY | Age: 72
Discharge: HOME | End: 2017-10-18
Payer: COMMERCIAL

## 2017-10-18 VITALS
TEMPERATURE: 97.3 F | HEART RATE: 74 BPM | RESPIRATION RATE: 17 BRPM | SYSTOLIC BLOOD PRESSURE: 161 MMHG | BODY MASS INDEX: 32.27 KG/M2 | HEIGHT: 66 IN | OXYGEN SATURATION: 98 % | WEIGHT: 200.8 LBS | DIASTOLIC BLOOD PRESSURE: 77 MMHG

## 2017-10-18 PROCEDURE — 99213 OFFICE O/P EST LOW 20 MIN: CPT | Performed by: RADIOLOGY

## 2017-10-18 NOTE — ONCOLOGY
RADIATION ONCOLOGY FOLLOW-UP    DATE OF VISIT: 10/18/2017    Patient Active Problem List   Diagnosis Code    DM (diabetes mellitus) ([facility] E11.9    Hypercholesteremia E78.00    Diabetic neuropathy [facility] E11.40    Arthritis M19.90    HTN (hypertension) I10    Elevated PSA R97.20    Prostate CA ([facility] C61     DIAGNOSIS: High  risk  prostate  cancer,  4+4=8  with  PNI,  T1c,  PSA  9.75. PRIOR TREATMENT: Prostate radiotherapy to a dose of 78Gywith elective pelvic natali irradiation, with neoadjuvant, concurrent, and now adjuvant androgen ablation. Casodex was discontinued with completion of radiotherapy, and the patient remains on Lupron with the expected 2 year duration. He completed radiotherapy on 2017. TIME SINCE TREATMENT: 3 months    INTERVAL HISTORY: The patient is currently doing quite well, with improved urinary symptoms in comparison to the situation late in the treatment course. IPSS score is now 45, with nocturia ×3-4. There is no dysuria or hematuria. No diarrhea or rectal symptoms. He has seen Dr. Susi Mendoza in follow-up, and his PSA is undetectable at less than 0.01. About a month ago the patient had returned with more severe urinary symptoms, with dysuria and increased frequency. We did a urinalysis and culture, which were negative apart from hematuria, and he was treated with antibiotics empirically. Symptoms have resolved.     PAST MEDICAL HISTORY:   Past Medical History:   Diagnosis Date    Arthritis     Diabetic neuropathy ([facility]     Elevated PSA     Hyperlipidemia     Hypertension     Osteoarthritis     Prostate cancer ([facility] 2017    Type II diabetes mellitus, uncontrolled ([facility]        PAST SURGICAL HISTORY:  Past Surgical History:   Procedure Laterality Date    CHOLECYSTECTOMY      CIRCUMCISION, NON-      OTHER SURGICAL HISTORY      gallstones removed    TONSILLECTOMY      ULTRASOUND PROSTATE/TRANSRECTAL N/A 2017    PROSTATE TRANSRECTAL ULTRASOUND BIOPSY performed by Reyes Wilks [facility] at [facility]        History   Smoking Status    Current Some Day Smoker    Packs/day: 0.25    Types: Cigarettes    Last attempt to quit: 1/3/2017   Smokeless Tobacco    Never Used     Comment: couple cigarettes a day     History   Alcohol Use No     Comment: rarely- 1 beer monthly       ALLERGIES:   Allergies   Allergen Reactions    Acetaminophen     Glipizide     Propoxyphene And Methadone [Methadone]         CURRENT MEDICATIONS:     Prior to Admission medications    Medication Sig Start Date End Date Taking? Authorizing Provider   hydrochlorothiazide (HYDRODIURIL) 25 MG tablet Take 1 tablet by mouth daily 10/3/17   Mendoza Posey MD   meloxicam (MOBIC) 7.5 MG tablet Take 1 tablet by mouth 2 times daily 10/3/17   Mendoza Posey MD   amLODIPine (NORVASC) 5 MG tablet Take 5 mg by mouth daily    Historical Provider, MD   NOVOFINE 32G X 6 MM MISC USE TWICE DAILY 8/3/17   Mendoza Posey MD   tamsulosin (FLOMAX) 0.4 MG capsule Take 0.4 mg by mouth 2 times daily     Historical Provider, MD   FREESTYLE LANCETS MISC TEST DAILY 6/5/17   Mendoza Posey MD   FREESTYLE LITE strip AS DIRECTED THREE TIMES DAILY 6/5/17   Mendoza Posey MD   simvastatin (ZOCOR) 40 MG tablet Take 1 tablet by mouth nightly 5/25/17   Mendoza Posey MD   metFORMIN (GLUCOPHAGE) 1000 MG tablet TAKE 1 TABLET BY MOUTH TWICE DAILY WITH MEALS 2/2/17   Mendoza Posey MD   insulin aspart protamine-insulin aspart (NOVOLOG MIX 70/30 FLEXPEN) (70-30) 100 UNIT/ML injection Inject 10 Units into the skin 2 times daily (with meals)    Historical Provider, MD   Blood Glucose Monitoring Suppl (FREESTYLE LITE) NEENA 1 Device by Does not apply route daily as needed  Patient taking differently: 1 Device by Does not apply route 3 times daily DX: E11.65, IDDM 5/20/16   Mendoza Posey MD   ranitidine (ZANTAC) 150 MG tablet Take 150 mg by mouth 2 times daily.     Historical Provider, MD     ECOG PERFORMANCE STATUS: 0    VITAL SIGNS:    Vitals:    10/18/17

## 2017-10-27 ENCOUNTER — OFFICE VISIT (OUTPATIENT)
Dept: UROLOGY | Age: 72
End: 2017-10-27

## 2017-10-27 VITALS
HEIGHT: 67 IN | DIASTOLIC BLOOD PRESSURE: 80 MMHG | WEIGHT: 200 LBS | SYSTOLIC BLOOD PRESSURE: 170 MMHG | BODY MASS INDEX: 31.39 KG/M2 | HEART RATE: 91 BPM

## 2017-10-27 DIAGNOSIS — R31.0 HEMATURIA, GROSS: Primary | ICD-10-CM

## 2017-10-27 DIAGNOSIS — R32 URINARY INCONTINENCE, UNSPECIFIED TYPE: ICD-10-CM

## 2017-10-27 DIAGNOSIS — R33.9 URINARY RETENTION: ICD-10-CM

## 2017-10-27 LAB — POST VOID RESIDUAL (PVR): 7 ML

## 2017-10-27 PROCEDURE — 4004F PT TOBACCO SCREEN RCVD TLK: CPT | Performed by: UROLOGY

## 2017-10-27 PROCEDURE — 4040F PNEUMOC VAC/ADMIN/RCVD: CPT | Performed by: UROLOGY

## 2017-10-27 PROCEDURE — G8417 CALC BMI ABV UP PARAM F/U: HCPCS | Performed by: UROLOGY

## 2017-10-27 PROCEDURE — 51798 US URINE CAPACITY MEASURE: CPT | Performed by: UROLOGY

## 2017-10-27 PROCEDURE — G8427 DOCREV CUR MEDS BY ELIG CLIN: HCPCS | Performed by: UROLOGY

## 2017-10-27 PROCEDURE — 99214 OFFICE O/P EST MOD 30 MIN: CPT | Performed by: UROLOGY

## 2017-10-27 PROCEDURE — 1123F ACP DISCUSS/DSCN MKR DOCD: CPT | Performed by: UROLOGY

## 2017-10-27 PROCEDURE — 3017F COLORECTAL CA SCREEN DOC REV: CPT | Performed by: UROLOGY

## 2017-10-27 PROCEDURE — G8484 FLU IMMUNIZE NO ADMIN: HCPCS | Performed by: UROLOGY

## 2017-10-27 RX ORDER — CEPHALEXIN 500 MG/1
500 CAPSULE ORAL EVERY 12 HOURS
Qty: 20 CAPSULE | Refills: 0 | Status: SHIPPED | OUTPATIENT
Start: 2017-10-27 | End: 2018-02-16 | Stop reason: ALTCHOICE

## 2017-10-27 NOTE — PROGRESS NOTES
[facility] EVALUATION NOTE                                                 H&P                                                                                                                                                 Reason for Visit  Gross hematuria    History of Present Illness  Three-day history of gross hematuria  Patient recently concluded radiation therapy  Patient's currently on Mobic 7.5 mg p.o. q. Day  Denies dysuria  No evidence of clot retention  Urine grossly bloody      Urologic Review of Systems/Symptoms  Denies hematuria  Denies dysuria  Denies incontinence  Denies flank pain  Other Urologic: Prostate cancer    Review of Systems  Head and neck: No issues/reviewed  Cardiac: No recent issues/reviewed  Pulmonary: No issues/reviewed  Gastrointestinal: No issues/reviewed  Neurologic: No recent issues/reviewed  Extremities: No issues/reviewed  Lymphatics: No lymphadenopathy no change  Genitourinary: See above  Skin: No issues/reviewed  Hospitalization: Recent conclusion of radiation therapy  Currently on Mobic  All 14 categories of Review of Systems otherwise reviewed no other findings reported.     Past Medical History:   Diagnosis Date    Arthritis     Diabetic neuropathy ([facility]     Elevated PSA     Hyperlipidemia     Hypertension     Osteoarthritis     Prostate cancer ([facility] 2017    Type II diabetes mellitus, uncontrolled ([facility]      Past Surgical History:   Procedure Laterality Date    CHOLECYSTECTOMY      CIRCUMCISION, NON-      OTHER SURGICAL HISTORY      gallstones removed    TONSILLECTOMY      ULTRASOUND PROSTATE/TRANSRECTAL N/A 2017    PROSTATE TRANSRECTAL ULTRASOUND BIOPSY performed by John Hodgson MD at [place] [address] Marital status:      Spouse name: N/A    Number of children: N/A    Years of education: N/A     Social History Main Topics    Smoking status: Current Some Day Smoker     Packs/day: 0.25 Types: Cigarettes     Last attempt to quit: 1/3/2017    Smokeless tobacco: Never Used      Comment: couple cigarettes a day    Alcohol use No      Comment: rarely- 1 beer monthly    Drug use: No    Sexual activity: No     Other Topics Concern    None     Social History Narrative    None     Family History   Problem Relation Age of Onset    Diabetes Mother      Current Outpatient Prescriptions   Medication Sig Dispense Refill    cephALEXin (KEFLEX) 500 MG capsule Take 1 capsule by mouth every 12 hours 20 capsule 0    hydrochlorothiazide (HYDRODIURIL) 25 MG tablet Take 1 tablet by mouth daily 30 tablet 03    meloxicam (MOBIC) 7.5 MG tablet Take 1 tablet by mouth 2 times daily 60 tablet 3    amLODIPine (NORVASC) 5 MG tablet Take 5 mg by mouth daily      NOVOFINE 32G X 6 MM MISC USE TWICE DAILY 100 each 3    tamsulosin (FLOMAX) 0.4 MG capsule Take 0.4 mg by mouth 2 times daily       FREESTYLE LANCETS MISC TEST DAILY 100 each 5    FREESTYLE LITE strip AS DIRECTED THREE TIMES DAILY 300 strip 5    simvastatin (ZOCOR) 40 MG tablet Take 1 tablet by mouth nightly 90 tablet 3    metFORMIN (GLUCOPHAGE) 1000 MG tablet TAKE 1 TABLET BY MOUTH TWICE DAILY WITH MEALS 180 tablet 1    insulin aspart protamine-insulin aspart (NOVOLOG MIX 70/30 FLEXPEN) (70-30) 100 UNIT/ML injection Inject 10 Units into the skin 2 times daily (with meals)      Blood Glucose Monitoring Suppl (FREESTYLE LITE) NEENA 1 Device by Does not apply route daily as needed (Patient taking differently: 1 Device by Does not apply route 3 times daily DX: E11.65, IDDM) 1 Device 0    ranitidine (ZANTAC) 150 MG tablet Take 150 mg by mouth 2 times daily. No current facility-administered medications for this visit.       Acetaminophen; Glipizide; and Propoxyphene and methadone [methadone]  All reviewed and verified by Dr Betty Hodge on today's visit    PSA   Date Value Ref Range Status   09/26/2017 <0.01 0.00 - 6.22 ng/mL Final   05/11/2017 0.77 0.00 - 6.22 ng/mL Final   01/13/2017 9.75 (H) 0.00 - 6.22 ng/mL Final     Results for POC orders placed in visit on 10/27/17   poct post void residual   Result Value Ref Range    post void residual 7 ml    Narrative    A point of care test   Post Void Residual was completed by performing  ultrasound scan of the bladder and  reviewed by Dr Narda Gatica       Physical Exam  Vitals:    10/27/17 1029 10/27/17 1053   BP: (!) 170/90 (!) 170/80   Pulse: 91    Weight: 200 lb (90.7 kg)    Height: 5' 7\" (1.702 m)      Constitutional: patient is oriented to person, place, and time. patient appears well-developed. not in distress. Ears: Adequate hearing/no hearing loss  Head: Normocephalic. Atraumatic  Neck: Normal range of motion. Cardiovascular: Normal rate, BP reviewed. sinus rhythm  Pulmonary/Chest: Normal respiratory effort  no shortness of breath  Abdominal: Not distended. No suprapubic pain  Urologic Exam  Postvoid residual 7 mL. Urinalysis grossly bloody. Neurologic exam otherwise unchanged . Musculoskeletal: Normal range of motion. Ambulatory. Extremities: No edema   Neurological: Cranial nerves intact   Skin: Skin is warm and dry. No lesions. No rashes or ulcers   Psychiatric:  Alert and oriented ×3 cooperative and pleasant. Assessment  Gross hematuria probably secondary to radiation cystitis exacerbated by anti-inflammatory, Mobic  Possible UTI  Plan  Urine culture  Keflex 500 mg p.o. B.i.d.  Stop Mobic  Follow-up 10/30/17  Greater than 50% of 30 minutes spent consulting patient face-to-face  Orders Placed This Encounter   Procedures    Urine Culture     Order Specific Question:   Specify (ex-cath, midstream, cysto, etc)?      Answer:   m    poct post void residual     Orders Placed This Encounter   Medications    cephALEXin (KEFLEX) 500 MG capsule     Sig: Take 1 capsule by mouth every 12 hours     Dispense:  20 capsule     Refill:  0     Harika Ochoa MD       Please note this report has been partially produced using speech recognition software  And may cause contain errors related to that system including grammar, punctuation and spelling as well as words and phrases that may seem inappropriate. If there are questions or concerns please feel free to contact me to clarify.

## 2017-10-29 LAB — URINE CULTURE, ROUTINE: NORMAL

## 2017-10-30 ENCOUNTER — OFFICE VISIT (OUTPATIENT)
Dept: UROLOGY | Age: 72
End: 2017-10-30

## 2017-10-30 VITALS
BODY MASS INDEX: 31.39 KG/M2 | HEART RATE: 71 BPM | HEIGHT: 67 IN | WEIGHT: 200 LBS | DIASTOLIC BLOOD PRESSURE: 84 MMHG | SYSTOLIC BLOOD PRESSURE: 124 MMHG

## 2017-10-30 DIAGNOSIS — R31.9 HEMATURIA, UNSPECIFIED TYPE: Primary | ICD-10-CM

## 2017-10-30 LAB
BILIRUBIN, POC: ABNORMAL
BLOOD URINE, POC: ABNORMAL
CLARITY, POC: ABNORMAL
COLOR, POC: YELLOW
GLUCOSE URINE, POC: ABNORMAL
KETONES, POC: ABNORMAL
LEUKOCYTE EST, POC: ABNORMAL
NITRITE, POC: ABNORMAL
PH, POC: 5
PROTEIN, POC: ABNORMAL
SPECIFIC GRAVITY, POC: 1.02
UROBILINOGEN, POC: 0.2

## 2017-10-30 PROCEDURE — 81003 URINALYSIS AUTO W/O SCOPE: CPT | Performed by: UROLOGY

## 2017-10-30 PROCEDURE — 4040F PNEUMOC VAC/ADMIN/RCVD: CPT | Performed by: UROLOGY

## 2017-10-30 PROCEDURE — 99212 OFFICE O/P EST SF 10 MIN: CPT | Performed by: UROLOGY

## 2017-10-30 PROCEDURE — 1123F ACP DISCUSS/DSCN MKR DOCD: CPT | Performed by: UROLOGY

## 2017-10-30 PROCEDURE — 4004F PT TOBACCO SCREEN RCVD TLK: CPT | Performed by: UROLOGY

## 2017-10-30 PROCEDURE — 3017F COLORECTAL CA SCREEN DOC REV: CPT | Performed by: UROLOGY

## 2017-10-30 PROCEDURE — G8484 FLU IMMUNIZE NO ADMIN: HCPCS | Performed by: UROLOGY

## 2017-10-30 PROCEDURE — G8417 CALC BMI ABV UP PARAM F/U: HCPCS | Performed by: UROLOGY

## 2017-10-30 PROCEDURE — G8427 DOCREV CUR MEDS BY ELIG CLIN: HCPCS | Performed by: UROLOGY

## 2017-10-30 NOTE — PROGRESS NOTES
Chaperone for Intimate Exam    1. Was chaperone offered as part of the rooming process? offered, declined   2. If Chaperone is declined by patient, NA: chaperone was available and exam completed  3.  Chaperone is n/a
concerns please feel free to contact me to clarify.

## 2017-11-03 RX ORDER — INSULIN ASPART 100 [IU]/ML
INJECTION, SUSPENSION SUBCUTANEOUS
Qty: 45 ML | Refills: 3 | Status: SHIPPED | OUTPATIENT
Start: 2017-11-03 | End: 2018-02-16 | Stop reason: SDUPTHER

## 2017-11-10 LAB
ANION GAP SERPL CALCULATED.3IONS-SCNC: 12 MEQ/L (ref 7–13)
BUN BLDV-MCNC: 23 MG/DL (ref 8–23)
CALCIUM SERPL-MCNC: 9.9 MG/DL (ref 8.6–10.2)
CHLORIDE BLD-SCNC: 102 MEQ/L (ref 98–107)
CO2: 28 MEQ/L (ref 22–29)
CREAT SERPL-MCNC: 0.71 MG/DL (ref 0.7–1.2)
GFR AFRICAN AMERICAN: >60
GFR NON-AFRICAN AMERICAN: >60
GLUCOSE BLD-MCNC: 95 MG/DL (ref 74–109)
HBA1C MFR BLD: 8.2 % (ref 4.8–5.9)
POTASSIUM SERPL-SCNC: 4.8 MEQ/L (ref 3.5–5.1)
SODIUM BLD-SCNC: 142 MEQ/L (ref 132–144)

## 2017-11-17 ENCOUNTER — OFFICE VISIT (OUTPATIENT)
Dept: SURGERY | Age: 72
End: 2017-11-17

## 2017-11-17 VITALS
WEIGHT: 197 LBS | SYSTOLIC BLOOD PRESSURE: 139 MMHG | HEIGHT: 67 IN | BODY MASS INDEX: 30.92 KG/M2 | HEART RATE: 114 BPM | DIASTOLIC BLOOD PRESSURE: 83 MMHG

## 2017-11-17 DIAGNOSIS — Z23 NEED FOR INFLUENZA VACCINATION: ICD-10-CM

## 2017-11-17 LAB — GLUCOSE BLD-MCNC: 172 MG/DL

## 2017-11-17 PROCEDURE — 4040F PNEUMOC VAC/ADMIN/RCVD: CPT | Performed by: INTERNAL MEDICINE

## 2017-11-17 PROCEDURE — 99213 OFFICE O/P EST LOW 20 MIN: CPT | Performed by: INTERNAL MEDICINE

## 2017-11-17 PROCEDURE — 90662 IIV NO PRSV INCREASED AG IM: CPT | Performed by: INTERNAL MEDICINE

## 2017-11-17 PROCEDURE — G8417 CALC BMI ABV UP PARAM F/U: HCPCS | Performed by: INTERNAL MEDICINE

## 2017-11-17 PROCEDURE — G0008 ADMIN INFLUENZA VIRUS VAC: HCPCS | Performed by: INTERNAL MEDICINE

## 2017-11-17 PROCEDURE — G8484 FLU IMMUNIZE NO ADMIN: HCPCS | Performed by: INTERNAL MEDICINE

## 2017-11-17 PROCEDURE — G8427 DOCREV CUR MEDS BY ELIG CLIN: HCPCS | Performed by: INTERNAL MEDICINE

## 2017-11-17 PROCEDURE — 1123F ACP DISCUSS/DSCN MKR DOCD: CPT | Performed by: INTERNAL MEDICINE

## 2017-11-17 PROCEDURE — 4004F PT TOBACCO SCREEN RCVD TLK: CPT | Performed by: INTERNAL MEDICINE

## 2017-11-17 PROCEDURE — 3017F COLORECTAL CA SCREEN DOC REV: CPT | Performed by: INTERNAL MEDICINE

## 2017-11-17 PROCEDURE — 3045F PR MOST RECENT HEMOGLOBIN A1C LEVEL 7.0-9.0%: CPT | Performed by: INTERNAL MEDICINE

## 2017-11-17 PROCEDURE — 82962 GLUCOSE BLOOD TEST: CPT | Performed by: INTERNAL MEDICINE

## 2017-11-17 RX ORDER — AMLODIPINE BESYLATE 5 MG/1
5 TABLET ORAL DAILY
Qty: 30 TABLET | Refills: 3 | Status: SHIPPED | OUTPATIENT
Start: 2017-11-17 | End: 2018-06-05 | Stop reason: SDUPTHER

## 2017-11-17 NOTE — PROGRESS NOTES
BILITOT 0.2 07/02/2016    ALKPHOS 92 07/02/2016    AST 12 07/02/2016    ALT 12 07/02/2016    LABGLOM >60.0 11/10/2017    GFRAA >60.0 11/10/2017    GLOB 2.6 07/02/2016         Assessment:     1. Type 2 diabetes mellitus with other specified complication, with long-term current use of insulin (HCC)  POCT Glucose   2.  Uncontrolled type 2 diabetes mellitus with complication, with long-term current use of insulin ([facility])  amLODIPine (NORVASC) 5 MG tablet           Plan:       Orders Placed This Encounter   Medications    amLODIPine (NORVASC) 5 MG tablet     Sig: Take 1 tablet by mouth daily     Dispense:  30 tablet     Refill:  3     Orders Placed This Encounter   Procedures    INFLUENZA, HIGH DOSE, 65 YRS +, IM, PF, PREFILL SYR, 0.5ML (FLUZONE HD)    Basic Metabolic Panel     Standing Status:   Future     Standing Expiration Date:   11/17/2018    Hemoglobin A1C     Standing Status:   Future     Standing Expiration Date:   11/17/2018    POCT Glucose     continue novolog 70/30 plus metformin     Medications Discontinued During This Encounter   Medication Reason    meloxicam (MOBIC) 7.5 MG tablet Therapy completed    amLODIPine (NORVASC) 5 MG tablet Reorder

## 2018-02-01 DIAGNOSIS — M19.90 ARTHRITIS: ICD-10-CM

## 2018-02-01 DIAGNOSIS — N18.9 CKD (CHRONIC KIDNEY DISEASE): ICD-10-CM

## 2018-02-01 RX ORDER — MELOXICAM 7.5 MG/1
TABLET ORAL
Qty: 180 TABLET | Refills: 0 | Status: SHIPPED | OUTPATIENT
Start: 2018-02-01 | End: 2018-05-08 | Stop reason: ALTCHOICE

## 2018-02-06 ENCOUNTER — TELEPHONE (OUTPATIENT)
Dept: UROLOGY | Age: 73
End: 2018-02-06

## 2018-02-06 NOTE — TELEPHONE ENCOUNTER
Pt wants to let Dr Marline Lora know he is very concerned about hematuria and urinary incontinence. He has apt in April for Lupron injection. I offered a sooner apt to discuss his current problems. Pt will call back if he decides to come in sooner.

## 2018-02-09 LAB
ANION GAP SERPL CALCULATED.3IONS-SCNC: 17 MEQ/L (ref 7–13)
BUN BLDV-MCNC: 22 MG/DL (ref 8–23)
CALCIUM SERPL-MCNC: 9.8 MG/DL (ref 8.6–10.2)
CHLORIDE BLD-SCNC: 99 MEQ/L (ref 98–107)
CO2: 25 MEQ/L (ref 22–29)
CREAT SERPL-MCNC: 0.9 MG/DL (ref 0.7–1.2)
GFR AFRICAN AMERICAN: >60
GFR NON-AFRICAN AMERICAN: >60
GLUCOSE BLD-MCNC: 160 MG/DL (ref 74–109)
HBA1C MFR BLD: 8.2 % (ref 4.8–5.9)
POTASSIUM SERPL-SCNC: 4.9 MEQ/L (ref 3.5–5.1)
SODIUM BLD-SCNC: 141 MEQ/L (ref 132–144)

## 2018-02-16 ENCOUNTER — OFFICE VISIT (OUTPATIENT)
Dept: ENDOCRINOLOGY | Age: 73
End: 2018-02-16
Payer: COMMERCIAL

## 2018-02-16 VITALS
SYSTOLIC BLOOD PRESSURE: 152 MMHG | BODY MASS INDEX: 31.39 KG/M2 | HEART RATE: 90 BPM | HEIGHT: 67 IN | DIASTOLIC BLOOD PRESSURE: 88 MMHG | WEIGHT: 200 LBS

## 2018-02-16 DIAGNOSIS — E11.43 DIABETIC AUTONOMIC NEUROPATHY ASSOCIATED WITH TYPE 2 DIABETES MELLITUS (HCC): ICD-10-CM

## 2018-02-16 DIAGNOSIS — Z79.4 TYPE 2 DIABETES MELLITUS WITH OTHER SPECIFIED COMPLICATION, WITH LONG-TERM CURRENT USE OF INSULIN (HCC): Primary | Chronic | ICD-10-CM

## 2018-02-16 DIAGNOSIS — E11.69 TYPE 2 DIABETES MELLITUS WITH OTHER SPECIFIED COMPLICATION, WITH LONG-TERM CURRENT USE OF INSULIN (HCC): Primary | Chronic | ICD-10-CM

## 2018-02-16 LAB — GLUCOSE BLD-MCNC: 143 MG/DL

## 2018-02-16 PROCEDURE — 3017F COLORECTAL CA SCREEN DOC REV: CPT | Performed by: INTERNAL MEDICINE

## 2018-02-16 PROCEDURE — 3045F PR MOST RECENT HEMOGLOBIN A1C LEVEL 7.0-9.0%: CPT | Performed by: INTERNAL MEDICINE

## 2018-02-16 PROCEDURE — 99213 OFFICE O/P EST LOW 20 MIN: CPT | Performed by: INTERNAL MEDICINE

## 2018-02-16 PROCEDURE — G8417 CALC BMI ABV UP PARAM F/U: HCPCS | Performed by: INTERNAL MEDICINE

## 2018-02-16 PROCEDURE — G8484 FLU IMMUNIZE NO ADMIN: HCPCS | Performed by: INTERNAL MEDICINE

## 2018-02-16 PROCEDURE — 4004F PT TOBACCO SCREEN RCVD TLK: CPT | Performed by: INTERNAL MEDICINE

## 2018-02-16 PROCEDURE — 1123F ACP DISCUSS/DSCN MKR DOCD: CPT | Performed by: INTERNAL MEDICINE

## 2018-02-16 PROCEDURE — 82962 GLUCOSE BLOOD TEST: CPT | Performed by: INTERNAL MEDICINE

## 2018-02-16 PROCEDURE — G8427 DOCREV CUR MEDS BY ELIG CLIN: HCPCS | Performed by: INTERNAL MEDICINE

## 2018-02-16 PROCEDURE — 4040F PNEUMOC VAC/ADMIN/RCVD: CPT | Performed by: INTERNAL MEDICINE

## 2018-02-16 RX ORDER — HYDROCHLOROTHIAZIDE 25 MG/1
25 TABLET ORAL DAILY
Qty: 30 TABLET | Refills: 3 | Status: SHIPPED | OUTPATIENT
Start: 2018-02-16 | End: 2018-06-05 | Stop reason: SDUPTHER

## 2018-02-16 NOTE — PROGRESS NOTES
Rfl: 03    NOVOFINE 32G X 6 MM MISC, USE TWICE DAILY, Disp: 100 each, Rfl: 3    tamsulosin (FLOMAX) 0.4 MG capsule, Take 0.4 mg by mouth 2 times daily , Disp: , Rfl:     FREESTYLE LANCETS MISC, TEST DAILY, Disp: 100 each, Rfl: 5    FREESTYLE LITE strip, AS DIRECTED THREE TIMES DAILY, Disp: 300 strip, Rfl: 5    simvastatin (ZOCOR) 40 MG tablet, Take 1 tablet by mouth nightly, Disp: 90 tablet, Rfl: 3    insulin aspart protamine-insulin aspart (NOVOLOG MIX 70/30 FLEXPEN) (70-30) 100 UNIT/ML injection, Inject 10 Units into the skin 2 times daily (with meals), Disp: , Rfl:     Blood Glucose Monitoring Suppl (FREESTYLE LITE) NEENA, 1 Device by Does not apply route daily as needed (Patient taking differently: 1 Device by Does not apply route 3 times daily DX: E11.65, IDDM), Disp: 1 Device, Rfl: 0    ranitidine (ZANTAC) 150 MG tablet, Take 150 mg by mouth 2 times daily. , Disp: , Rfl:     Review of Systems   Genitourinary: Positive for hematuria. Neurological: Positive for tremors. All other systems reviewed and are negative. Vitals:    02/16/18 0954 02/16/18 0958   BP: (!) 164/80 (!) 152/88   Site: Left Arm Right Arm   Position: Sitting Sitting   Cuff Size: Medium Adult Medium Adult   Pulse: 90    Weight: 200 lb (90.7 kg)    Height: 5' 7\" (1.702 m)        Objective:   Physical Exam   Constitutional: He appears well-developed and well-nourished. HENT:   Head: Normocephalic and atraumatic. Cardiovascular: Normal rate. Abdominal:   Obese    Musculoskeletal: Normal range of motion. Feet:    Neurological: He is alert. Skin: Skin is warm. Psychiatric: He has a normal mood and affect. Results for Sruthi Thompson (MRN [id number]) as of 2/16/2018 10:12   Ref.  Range 2/9/2018 07:42   Sodium Latest Ref Range: 132 - 144 mEq/L 141   Potassium Latest Ref Range: 3.5 - 5.1 mEq/L 4.9   Chloride Latest Ref Range: 98 - 107 mEq/L 99   CO2 Latest Ref Range: 22 - 29 mEq/L 25   BUN Latest Ref Range: 8 - 23 mg/dL 22 Creatinine Latest Ref Range: 0.70 - 1.20 mg/dL 0.90   Anion Gap Latest Ref Range: 7 - 13 mEq/L 17 (H)   GFR Non- Latest Ref Range: >60  >60.0   GFR African American Latest Ref Range: >60  >60.0   Glucose Latest Ref Range: 74 - 109 mg/dL 160 (H)   Calcium Latest Ref Range: 8.6 - 10.2 mg/dL 9.8   Hemoglobin A1C Latest Ref Range: 4.8 - 5.9 % 8.2 (H)       Assessment:     1. Type 2 diabetes mellitus with other specified complication, with long-term current use of insulin (HCC)  POCT Glucose   2.  Diabetic autonomic neuropathy associated with type 2 diabetes mellitus (HCC)  hydrochlorothiazide (HYDRODIURIL) 25 MG tablet           Plan:       Orders Placed This Encounter   Procedures    Basic Metabolic Panel     Standing Status:   Future     Standing Expiration Date:   2/16/2019    Hemoglobin A1C     Standing Status:   Future     Standing Expiration Date:   2/16/2019    Microalbumin / Creatinine Urine Ratio     Standing Status:   Future     Standing Expiration Date:   2/16/2019    POCT Glucose     Orders Placed This Encounter   Medications    insulin aspart protamine-insulin aspart (NOVOLOG MIX 70/30 FLEXPEN) (70-30) 100 UNIT/ML injection     Sig: INJECT 40 UNITS UNDER THE SKIN TWICE DAILY     Dispense:  45 mL     Refill:  3    metFORMIN (GLUCOPHAGE) 1000 MG tablet     Sig: TAKE 1 TABLET BY MOUTH TWICE DAILY WITH MEALS     Dispense:  180 tablet     Refill:  1     **Patient requests 90 days supply**    hydrochlorothiazide (HYDRODIURIL) 25 MG tablet     Sig: Take 1 tablet by mouth daily     Dispense:  30 tablet     Refill:  03    Insulin Pen Needle (NOVOFINE) 32G X 6 MM MISC     Sig: USE TWICE DAILY, DX: E11.65, IDDM     Dispense:  100 each     Refill:  6

## 2018-03-26 DIAGNOSIS — C61 PROSTATE CA (HCC): ICD-10-CM

## 2018-03-26 LAB — PROSTATE SPECIFIC ANTIGEN: <0.01 NG/ML (ref 0–6.22)

## 2018-04-03 ENCOUNTER — OFFICE VISIT (OUTPATIENT)
Dept: UROLOGY | Age: 73
End: 2018-04-03
Payer: COMMERCIAL

## 2018-04-03 VITALS
BODY MASS INDEX: 31.71 KG/M2 | HEIGHT: 67 IN | DIASTOLIC BLOOD PRESSURE: 78 MMHG | SYSTOLIC BLOOD PRESSURE: 136 MMHG | WEIGHT: 202 LBS | HEART RATE: 66 BPM

## 2018-04-03 DIAGNOSIS — C61 PROSTATE CA (HCC): ICD-10-CM

## 2018-04-03 DIAGNOSIS — R31.0 HEMATURIA, GROSS: ICD-10-CM

## 2018-04-03 DIAGNOSIS — R31.0 HEMATURIA, GROSS: Primary | ICD-10-CM

## 2018-04-03 LAB
HCT VFR BLD CALC: 32.4 % (ref 42–52)
HEMOGLOBIN: 10.7 G/DL (ref 14–18)
MCH RBC QN AUTO: 30.3 PG (ref 27–31.3)
MCHC RBC AUTO-ENTMCNC: 33.1 % (ref 33–37)
MCV RBC AUTO: 91.3 FL (ref 80–100)
PDW BLD-RTO: 14.4 % (ref 11.5–14.5)
PLATELET # BLD: 139 K/UL (ref 130–400)
RBC # BLD: 3.55 M/UL (ref 4.7–6.1)
WBC # BLD: 5.7 K/UL (ref 4.8–10.8)

## 2018-04-03 PROCEDURE — 96402 CHEMO HORMON ANTINEOPL SQ/IM: CPT | Performed by: UROLOGY

## 2018-04-03 PROCEDURE — 4004F PT TOBACCO SCREEN RCVD TLK: CPT | Performed by: UROLOGY

## 2018-04-03 PROCEDURE — G8417 CALC BMI ABV UP PARAM F/U: HCPCS | Performed by: UROLOGY

## 2018-04-03 PROCEDURE — G8428 CUR MEDS NOT DOCUMENT: HCPCS | Performed by: UROLOGY

## 2018-04-03 PROCEDURE — 1123F ACP DISCUSS/DSCN MKR DOCD: CPT | Performed by: UROLOGY

## 2018-04-03 PROCEDURE — 99214 OFFICE O/P EST MOD 30 MIN: CPT | Performed by: UROLOGY

## 2018-04-03 PROCEDURE — 3017F COLORECTAL CA SCREEN DOC REV: CPT | Performed by: UROLOGY

## 2018-04-03 PROCEDURE — 4040F PNEUMOC VAC/ADMIN/RCVD: CPT | Performed by: UROLOGY

## 2018-04-03 RX ORDER — CEPHALEXIN 500 MG/1
500 CAPSULE ORAL EVERY 12 HOURS
Qty: 20 CAPSULE | Refills: 0 | Status: SHIPPED | OUTPATIENT
Start: 2018-04-03 | End: 2018-06-05 | Stop reason: ALTCHOICE

## 2018-04-03 ASSESSMENT — PATIENT HEALTH QUESTIONNAIRE - PHQ9
SUM OF ALL RESPONSES TO PHQ QUESTIONS 1-9: 0
SUM OF ALL RESPONSES TO PHQ9 QUESTIONS 1 & 2: 0
1. LITTLE INTEREST OR PLEASURE IN DOING THINGS: 0
2. FEELING DOWN, DEPRESSED OR HOPELESS: 0

## 2018-04-05 ENCOUNTER — PROCEDURE VISIT (OUTPATIENT)
Dept: UROLOGY | Age: 73
End: 2018-04-05
Payer: COMMERCIAL

## 2018-04-05 VITALS
HEIGHT: 67 IN | WEIGHT: 200 LBS | BODY MASS INDEX: 31.39 KG/M2 | SYSTOLIC BLOOD PRESSURE: 122 MMHG | DIASTOLIC BLOOD PRESSURE: 70 MMHG | HEART RATE: 104 BPM

## 2018-04-05 DIAGNOSIS — R31.0 HEMATURIA, GROSS: ICD-10-CM

## 2018-04-05 DIAGNOSIS — C61 PROSTATE CA (HCC): Primary | ICD-10-CM

## 2018-04-05 LAB — URINE CULTURE, ROUTINE: NORMAL

## 2018-04-05 PROCEDURE — 52000 CYSTOURETHROSCOPY: CPT | Performed by: UROLOGY

## 2018-04-05 PROCEDURE — 99999 PR OFFICE/OUTPT VISIT,PROCEDURE ONLY: CPT | Performed by: UROLOGY

## 2018-04-09 ENCOUNTER — HOSPITAL ENCOUNTER (OUTPATIENT)
Dept: CT IMAGING | Age: 73
Discharge: HOME | End: 2018-04-11
Payer: COMMERCIAL

## 2018-04-09 ENCOUNTER — OFFICE VISIT (OUTPATIENT)
Dept: UROLOGY | Age: 73
End: 2018-04-09
Payer: COMMERCIAL

## 2018-04-09 ENCOUNTER — HOSPITAL ENCOUNTER (OUTPATIENT)
Dept: GENERAL RADIOLOGY | Age: 73
Discharge: HOME | End: 2018-04-11
Payer: COMMERCIAL

## 2018-04-09 VITALS
SYSTOLIC BLOOD PRESSURE: 138 MMHG | WEIGHT: 200 LBS | HEART RATE: 72 BPM | DIASTOLIC BLOOD PRESSURE: 68 MMHG | HEIGHT: 67 IN | BODY MASS INDEX: 31.39 KG/M2

## 2018-04-09 VITALS
DIASTOLIC BLOOD PRESSURE: 81 MMHG | SYSTOLIC BLOOD PRESSURE: 168 MMHG | WEIGHT: 200 LBS | BODY MASS INDEX: 31.39 KG/M2 | HEART RATE: 78 BPM | HEIGHT: 67 IN | RESPIRATION RATE: 16 BRPM

## 2018-04-09 DIAGNOSIS — R31.9 HEMATURIA, UNSPECIFIED TYPE: ICD-10-CM

## 2018-04-09 DIAGNOSIS — R31.0 HEMATURIA, GROSS: Primary | ICD-10-CM

## 2018-04-09 DIAGNOSIS — R31.0 GROSS HEMATURIA: ICD-10-CM

## 2018-04-09 DIAGNOSIS — R31.0 HEMATURIA, GROSS: ICD-10-CM

## 2018-04-09 PROCEDURE — G8427 DOCREV CUR MEDS BY ELIG CLIN: HCPCS | Performed by: UROLOGY

## 2018-04-09 PROCEDURE — 3017F COLORECTAL CA SCREEN DOC REV: CPT | Performed by: UROLOGY

## 2018-04-09 PROCEDURE — 74018 RADEX ABDOMEN 1 VIEW: CPT

## 2018-04-09 PROCEDURE — 1123F ACP DISCUSS/DSCN MKR DOCD: CPT | Performed by: UROLOGY

## 2018-04-09 PROCEDURE — 2580000003 HC RX 258: Performed by: UROLOGY

## 2018-04-09 PROCEDURE — 6360000004 HC RX CONTRAST MEDICATION: Performed by: UROLOGY

## 2018-04-09 PROCEDURE — 99213 OFFICE O/P EST LOW 20 MIN: CPT | Performed by: UROLOGY

## 2018-04-09 PROCEDURE — 4004F PT TOBACCO SCREEN RCVD TLK: CPT | Performed by: UROLOGY

## 2018-04-09 PROCEDURE — 4040F PNEUMOC VAC/ADMIN/RCVD: CPT | Performed by: UROLOGY

## 2018-04-09 PROCEDURE — G8417 CALC BMI ABV UP PARAM F/U: HCPCS | Performed by: UROLOGY

## 2018-04-09 PROCEDURE — 74178 CT ABD&PLV WO CNTR FLWD CNTR: CPT

## 2018-04-09 RX ORDER — SODIUM CHLORIDE 0.9 % (FLUSH) 0.9 %
10 SYRINGE (ML) INJECTION ONCE
Status: COMPLETED | OUTPATIENT
Start: 2018-04-09 | End: 2018-04-09

## 2018-04-09 RX ADMIN — IOPAMIDOL 100 ML: 755 INJECTION, SOLUTION INTRAVENOUS at 14:05

## 2018-04-09 RX ADMIN — SODIUM CHLORIDE, PRESERVATIVE FREE 10 ML: 5 INJECTION INTRAVENOUS at 14:05

## 2018-05-08 ENCOUNTER — OFFICE VISIT (OUTPATIENT)
Dept: ENDOCRINOLOGY | Age: 73
End: 2018-05-08
Payer: COMMERCIAL

## 2018-05-08 VITALS — SYSTOLIC BLOOD PRESSURE: 127 MMHG | HEART RATE: 66 BPM | DIASTOLIC BLOOD PRESSURE: 78 MMHG

## 2018-05-08 DIAGNOSIS — Z79.4 TYPE 2 DIABETES MELLITUS WITH OTHER SPECIFIED COMPLICATION, WITH LONG-TERM CURRENT USE OF INSULIN (HCC): Primary | Chronic | ICD-10-CM

## 2018-05-08 DIAGNOSIS — E11.69 TYPE 2 DIABETES MELLITUS WITH OTHER SPECIFIED COMPLICATION, WITH LONG-TERM CURRENT USE OF INSULIN (HCC): Primary | Chronic | ICD-10-CM

## 2018-05-08 LAB
GLUCOSE BLD-MCNC: 164 MG/DL
HBA1C MFR BLD: 7 %

## 2018-05-08 PROCEDURE — G8417 CALC BMI ABV UP PARAM F/U: HCPCS | Performed by: PHYSICIAN ASSISTANT

## 2018-05-08 PROCEDURE — 83036 HEMOGLOBIN GLYCOSYLATED A1C: CPT | Performed by: PHYSICIAN ASSISTANT

## 2018-05-08 PROCEDURE — 1123F ACP DISCUSS/DSCN MKR DOCD: CPT | Performed by: PHYSICIAN ASSISTANT

## 2018-05-08 PROCEDURE — 3045F PR MOST RECENT HEMOGLOBIN A1C LEVEL 7.0-9.0%: CPT | Performed by: PHYSICIAN ASSISTANT

## 2018-05-08 PROCEDURE — 99214 OFFICE O/P EST MOD 30 MIN: CPT | Performed by: PHYSICIAN ASSISTANT

## 2018-05-08 PROCEDURE — 4040F PNEUMOC VAC/ADMIN/RCVD: CPT | Performed by: PHYSICIAN ASSISTANT

## 2018-05-08 PROCEDURE — 2022F DILAT RTA XM EVC RTNOPTHY: CPT | Performed by: PHYSICIAN ASSISTANT

## 2018-05-08 PROCEDURE — 3017F COLORECTAL CA SCREEN DOC REV: CPT | Performed by: PHYSICIAN ASSISTANT

## 2018-05-08 PROCEDURE — 4004F PT TOBACCO SCREEN RCVD TLK: CPT | Performed by: PHYSICIAN ASSISTANT

## 2018-05-08 PROCEDURE — G8427 DOCREV CUR MEDS BY ELIG CLIN: HCPCS | Performed by: PHYSICIAN ASSISTANT

## 2018-05-08 PROCEDURE — 82962 GLUCOSE BLOOD TEST: CPT | Performed by: PHYSICIAN ASSISTANT

## 2018-05-08 RX ORDER — DOCUSATE SODIUM 100 MG/1
100 CAPSULE, LIQUID FILLED ORAL 2 TIMES DAILY
COMMUNITY
End: 2018-06-05 | Stop reason: ALTCHOICE

## 2018-05-08 RX ORDER — CARVEDILOL 6.25 MG/1
6.25 TABLET ORAL 2 TIMES DAILY WITH MEALS
COMMUNITY
End: 2018-06-05 | Stop reason: ALTCHOICE

## 2018-05-08 ASSESSMENT — ENCOUNTER SYMPTOMS
BACK PAIN: 1
DIARRHEA: 0
EYE REDNESS: 0
EYE PAIN: 0
SHORTNESS OF BREATH: 0
SINUS PRESSURE: 0
VOMITING: 0
WHEEZING: 0
ABDOMINAL PAIN: 0
RHINORRHEA: 0
SORE THROAT: 0
COUGH: 0
NAUSEA: 0

## 2018-05-18 ENCOUNTER — TELEPHONE (OUTPATIENT)
Dept: UROLOGY | Age: 73
End: 2018-05-18

## 2018-05-29 RX ORDER — SIMVASTATIN 40 MG
TABLET ORAL
Qty: 90 TABLET | Refills: 0 | Status: SHIPPED | OUTPATIENT
Start: 2018-05-29 | End: 2018-08-27 | Stop reason: SDUPTHER

## 2018-06-05 ENCOUNTER — OFFICE VISIT (OUTPATIENT)
Dept: ENDOCRINOLOGY | Age: 73
End: 2018-06-05
Payer: COMMERCIAL

## 2018-06-05 VITALS
HEIGHT: 67 IN | WEIGHT: 185 LBS | HEART RATE: 82 BPM | SYSTOLIC BLOOD PRESSURE: 135 MMHG | DIASTOLIC BLOOD PRESSURE: 83 MMHG | BODY MASS INDEX: 29.03 KG/M2

## 2018-06-05 DIAGNOSIS — Z79.4 TYPE 2 DIABETES MELLITUS WITH OTHER SPECIFIED COMPLICATION, WITH LONG-TERM CURRENT USE OF INSULIN (HCC): Chronic | ICD-10-CM

## 2018-06-05 DIAGNOSIS — E11.43 DIABETIC AUTONOMIC NEUROPATHY ASSOCIATED WITH TYPE 2 DIABETES MELLITUS (HCC): Primary | ICD-10-CM

## 2018-06-05 DIAGNOSIS — I10 HYPERTENSION, UNSPECIFIED TYPE: Chronic | ICD-10-CM

## 2018-06-05 DIAGNOSIS — R13.10 DYSPHAGIA, UNSPECIFIED TYPE: ICD-10-CM

## 2018-06-05 DIAGNOSIS — E11.69 TYPE 2 DIABETES MELLITUS WITH OTHER SPECIFIED COMPLICATION, WITH LONG-TERM CURRENT USE OF INSULIN (HCC): Chronic | ICD-10-CM

## 2018-06-05 LAB — GLUCOSE BLD-MCNC: 98 MG/DL

## 2018-06-05 PROCEDURE — 1123F ACP DISCUSS/DSCN MKR DOCD: CPT | Performed by: PHYSICIAN ASSISTANT

## 2018-06-05 PROCEDURE — 3017F COLORECTAL CA SCREEN DOC REV: CPT | Performed by: PHYSICIAN ASSISTANT

## 2018-06-05 PROCEDURE — 99214 OFFICE O/P EST MOD 30 MIN: CPT | Performed by: PHYSICIAN ASSISTANT

## 2018-06-05 PROCEDURE — G8417 CALC BMI ABV UP PARAM F/U: HCPCS | Performed by: PHYSICIAN ASSISTANT

## 2018-06-05 PROCEDURE — 82962 GLUCOSE BLOOD TEST: CPT | Performed by: PHYSICIAN ASSISTANT

## 2018-06-05 PROCEDURE — 4004F PT TOBACCO SCREEN RCVD TLK: CPT | Performed by: PHYSICIAN ASSISTANT

## 2018-06-05 PROCEDURE — 2022F DILAT RTA XM EVC RTNOPTHY: CPT | Performed by: PHYSICIAN ASSISTANT

## 2018-06-05 PROCEDURE — 3045F PR MOST RECENT HEMOGLOBIN A1C LEVEL 7.0-9.0%: CPT | Performed by: PHYSICIAN ASSISTANT

## 2018-06-05 PROCEDURE — 4040F PNEUMOC VAC/ADMIN/RCVD: CPT | Performed by: PHYSICIAN ASSISTANT

## 2018-06-05 PROCEDURE — G8427 DOCREV CUR MEDS BY ELIG CLIN: HCPCS | Performed by: PHYSICIAN ASSISTANT

## 2018-06-05 RX ORDER — AMLODIPINE BESYLATE 5 MG/1
5 TABLET ORAL DAILY
Qty: 30 TABLET | Refills: 5 | Status: SHIPPED | OUTPATIENT
Start: 2018-06-05 | End: 2018-06-05 | Stop reason: SDUPTHER

## 2018-06-05 RX ORDER — HYDROCHLOROTHIAZIDE 25 MG/1
25 TABLET ORAL DAILY
Qty: 30 TABLET | Refills: 5 | Status: SHIPPED | OUTPATIENT
Start: 2018-06-05 | End: 2018-06-05 | Stop reason: SDUPTHER

## 2018-06-05 ASSESSMENT — ENCOUNTER SYMPTOMS
SINUS PRESSURE: 0
SHORTNESS OF BREATH: 0
BACK PAIN: 1
COUGH: 0
DIARRHEA: 0
EYE PAIN: 0
WHEEZING: 0
VOMITING: 0
ABDOMINAL PAIN: 0
EYE REDNESS: 0
SORE THROAT: 0
NAUSEA: 0
RHINORRHEA: 0

## 2018-06-12 ENCOUNTER — HOSPITAL ENCOUNTER (OUTPATIENT)
Dept: GENERAL RADIOLOGY | Age: 73
Discharge: HOME | End: 2018-06-14
Payer: COMMERCIAL

## 2018-06-12 DIAGNOSIS — R13.10 DYSPHAGIA, UNSPECIFIED TYPE: ICD-10-CM

## 2018-06-12 PROCEDURE — 2500000003 HC RX 250 WO HCPCS: Performed by: FAMILY MEDICINE

## 2018-06-12 PROCEDURE — 74230 X-RAY XM SWLNG FUNCJ C+: CPT

## 2018-06-12 RX ADMIN — BARIUM SULFATE 50 G: 0.81 POWDER, FOR SUSPENSION ORAL at 13:15

## 2018-08-27 LAB
ANION GAP SERPL CALCULATED.3IONS-SCNC: 13 MEQ/L (ref 7–13)
BUN BLDV-MCNC: 35 MG/DL (ref 8–23)
CALCIUM SERPL-MCNC: 9.5 MG/DL (ref 8.6–10.2)
CHLORIDE BLD-SCNC: 105 MEQ/L (ref 98–107)
CO2: 25 MEQ/L (ref 22–29)
CREAT SERPL-MCNC: 1.52 MG/DL (ref 0.7–1.2)
GFR AFRICAN AMERICAN: 54.4
GFR NON-AFRICAN AMERICAN: 44.9
GLUCOSE BLD-MCNC: 75 MG/DL (ref 74–109)
HBA1C MFR BLD: 8.2 % (ref 4.8–5.9)
POTASSIUM SERPL-SCNC: 4.7 MEQ/L (ref 3.5–5.1)
SODIUM BLD-SCNC: 143 MEQ/L (ref 132–144)

## 2018-08-27 RX ORDER — SIMVASTATIN 40 MG
TABLET ORAL
Qty: 90 TABLET | Refills: 2 | Status: SHIPPED | OUTPATIENT
Start: 2018-08-27 | End: 2018-12-06 | Stop reason: ALTCHOICE

## 2018-09-06 ENCOUNTER — OFFICE VISIT (OUTPATIENT)
Dept: UROLOGY | Age: 73
End: 2018-09-06
Payer: COMMERCIAL

## 2018-09-06 ENCOUNTER — OFFICE VISIT (OUTPATIENT)
Dept: ENDOCRINOLOGY | Age: 73
End: 2018-09-06
Payer: COMMERCIAL

## 2018-09-06 VITALS
HEIGHT: 66 IN | BODY MASS INDEX: 31.98 KG/M2 | HEART RATE: 67 BPM | SYSTOLIC BLOOD PRESSURE: 151 MMHG | DIASTOLIC BLOOD PRESSURE: 71 MMHG | WEIGHT: 199 LBS

## 2018-09-06 VITALS
SYSTOLIC BLOOD PRESSURE: 132 MMHG | WEIGHT: 175 LBS | HEIGHT: 66 IN | DIASTOLIC BLOOD PRESSURE: 72 MMHG | HEART RATE: 72 BPM | BODY MASS INDEX: 28.12 KG/M2

## 2018-09-06 DIAGNOSIS — C61 PROSTATE CA (HCC): Primary | ICD-10-CM

## 2018-09-06 DIAGNOSIS — E11.69 TYPE 2 DIABETES MELLITUS WITH OTHER SPECIFIED COMPLICATION, WITH LONG-TERM CURRENT USE OF INSULIN (HCC): Primary | Chronic | ICD-10-CM

## 2018-09-06 DIAGNOSIS — N28.9 RENAL INSUFFICIENCY: ICD-10-CM

## 2018-09-06 DIAGNOSIS — Z79.4 TYPE 2 DIABETES MELLITUS WITH OTHER SPECIFIED COMPLICATION, WITH LONG-TERM CURRENT USE OF INSULIN (HCC): Primary | Chronic | ICD-10-CM

## 2018-09-06 LAB — GLUCOSE BLD-MCNC: 123 MG/DL

## 2018-09-06 PROCEDURE — 1123F ACP DISCUSS/DSCN MKR DOCD: CPT | Performed by: UROLOGY

## 2018-09-06 PROCEDURE — G8427 DOCREV CUR MEDS BY ELIG CLIN: HCPCS | Performed by: PHYSICIAN ASSISTANT

## 2018-09-06 PROCEDURE — G8417 CALC BMI ABV UP PARAM F/U: HCPCS | Performed by: PHYSICIAN ASSISTANT

## 2018-09-06 PROCEDURE — 4004F PT TOBACCO SCREEN RCVD TLK: CPT | Performed by: PHYSICIAN ASSISTANT

## 2018-09-06 PROCEDURE — 4040F PNEUMOC VAC/ADMIN/RCVD: CPT | Performed by: PHYSICIAN ASSISTANT

## 2018-09-06 PROCEDURE — 82962 GLUCOSE BLOOD TEST: CPT | Performed by: PHYSICIAN ASSISTANT

## 2018-09-06 PROCEDURE — 99213 OFFICE O/P EST LOW 20 MIN: CPT | Performed by: UROLOGY

## 2018-09-06 PROCEDURE — 4040F PNEUMOC VAC/ADMIN/RCVD: CPT | Performed by: UROLOGY

## 2018-09-06 PROCEDURE — 4004F PT TOBACCO SCREEN RCVD TLK: CPT | Performed by: UROLOGY

## 2018-09-06 PROCEDURE — 3017F COLORECTAL CA SCREEN DOC REV: CPT | Performed by: PHYSICIAN ASSISTANT

## 2018-09-06 PROCEDURE — 3017F COLORECTAL CA SCREEN DOC REV: CPT | Performed by: UROLOGY

## 2018-09-06 PROCEDURE — 1123F ACP DISCUSS/DSCN MKR DOCD: CPT | Performed by: PHYSICIAN ASSISTANT

## 2018-09-06 PROCEDURE — G8427 DOCREV CUR MEDS BY ELIG CLIN: HCPCS | Performed by: UROLOGY

## 2018-09-06 PROCEDURE — 2022F DILAT RTA XM EVC RTNOPTHY: CPT | Performed by: PHYSICIAN ASSISTANT

## 2018-09-06 PROCEDURE — G8417 CALC BMI ABV UP PARAM F/U: HCPCS | Performed by: UROLOGY

## 2018-09-06 PROCEDURE — 99214 OFFICE O/P EST MOD 30 MIN: CPT | Performed by: PHYSICIAN ASSISTANT

## 2018-09-06 PROCEDURE — 1101F PT FALLS ASSESS-DOCD LE1/YR: CPT | Performed by: PHYSICIAN ASSISTANT

## 2018-09-06 PROCEDURE — 3045F PR MOST RECENT HEMOGLOBIN A1C LEVEL 7.0-9.0%: CPT | Performed by: PHYSICIAN ASSISTANT

## 2018-09-06 PROCEDURE — 1101F PT FALLS ASSESS-DOCD LE1/YR: CPT | Performed by: UROLOGY

## 2018-09-06 ASSESSMENT — ENCOUNTER SYMPTOMS
SORE THROAT: 0
DIARRHEA: 0
BACK PAIN: 1
COUGH: 0
RHINORRHEA: 0
EYE PAIN: 0
WHEEZING: 0
ABDOMINAL PAIN: 0
NAUSEA: 0
EYE REDNESS: 0
SHORTNESS OF BREATH: 0
VOMITING: 0
SINUS PRESSURE: 0

## 2018-09-06 NOTE — PROGRESS NOTES
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, prostate cancer    History of Present Illness  Patient recently underwent right nephroureterectomy by robotics at [facility]  Patient will start to have cystoscopies on every 6 month basis  History of prostate cancer treated 10 years ago with prostatectomy with postop radiation  PSA remains at 0  Patient denies voiding issues      Urologic Review of Systems/Symptoms  Denies hematuria  Denies dysuria  Denies incontinence  Denies flank pain  Other Urologic: No issues with urination    Review of Systems  Head and neck: No issues/reviewed  Cardiac: No recent issues/reviewed  Pulmonary: No issues/reviewed  Gastrointestinal: No issues/reviewed  Neurologic: No recent issues/reviewed  Extremities: No issues/reviewed  Lymphatics: No lymphadenopathy no change  Genitourinary: See above  Skin: No issues/reviewed  Hospitalization: None recent  Medications reviewed  All 14 categories of Review of Systems otherwise reviewed no other findings reported.     Past Medical History:   Diagnosis Date    Arthritis     [facility]     Elevated PSA     Hyperlipidemia     Hypertension     Osteoarthritis     Prostate cancer ([facility] 2017    Type II diabetes mellitus, uncontrolled ([facility]      Past Surgical History:   Procedure Laterality Date    CHOLECYSTECTOMY      CIRCUMCISION, NON-      OTHER SURGICAL HISTORY      gallstones removed    TONSILLECTOMY      ULTRASOUND PROSTATE/TRANSRECTAL N/A 2017    PROSTATE TRANSRECTAL ULTRASOUND BIOPSY performed by Lakia Maravilla MD at [place] [id number] Marital status:      Spouse name: N/A    Number of children: N/A   

## 2018-09-06 NOTE — PROGRESS NOTES
Surgical History:   Procedure Laterality Date    CHOLECYSTECTOMY      CIRCUMCISION, NON-      OTHER SURGICAL HISTORY      gallstones removed    TONSILLECTOMY      ULTRASOUND PROSTATE/TRANSRECTAL N/A 2017    PROSTATE TRANSRECTAL ULTRASOUND BIOPSY performed by Se Trujillo MD at [address] Marital status:      Spouse name: N/A    Number of children: N/A    Years of education: N/A     Occupational History    Not on file. Social History Main Topics    Smoking status: Current Some Day Smoker     Packs/day: 0.25     Types: Cigarettes     Last attempt to quit: 1/3/2017    Smokeless tobacco: Never Used      Comment: couple cigarettes a day    Alcohol use No      Comment: rarely- 1 beer monthly    Drug use: No    Sexual activity: No     Other Topics Concern    Not on file     Social History Narrative    No narrative on file     Family History   Problem Relation Age of Onset    Diabetes Mother          Review of Systems   Constitutional: Negative for chills, fatigue and fever. HENT: Negative for congestion, ear pain, postnasal drip, rhinorrhea, sinus pressure and sore throat. Eyes: Negative for pain and redness. Respiratory: Negative for cough, shortness of breath and wheezing. Cardiovascular: Negative for chest pain, palpitations and leg swelling. Gastrointestinal: Negative for abdominal pain, diarrhea, nausea and vomiting. Dysphagia with solid foods   Occasional heart burn   Endocrine: Negative for cold intolerance, heat intolerance, polydipsia, polyphagia and polyuria. Genitourinary: Negative for difficulty urinating and hematuria. Retention   Musculoskeletal: Positive for back pain. Negative for arthralgias. Skin: Negative for rash. Neurological: Positive for tremors (with hypoglycemia) and weakness (with hypoglycemia). Negative for dizziness and headaches.        Objective:   Physical Exam   Constitutional: He is oriented to person, place, and time. He appears well-developed and well-nourished. HENT:   Head: Normocephalic and atraumatic. Eyes: Conjunctivae and EOM are normal.   Neck: Normal range of motion. Neck supple. No JVD present. No tracheal deviation present. No thyromegaly present. Cardiovascular: Normal rate, regular rhythm and normal heart sounds. Pulses:       Carotid pulses are 2+ on the right side, and 2+ on the left side. Radial pulses are 2+ on the right side, and 2+ on the left side. Popliteal pulses are 2+ on the right side, and 2+ on the left side. Dorsalis pedis pulses are 2+ on the right side, and 2+ on the left side. Pulmonary/Chest: Effort normal and breath sounds normal.   Abdominal: Soft. Bowel sounds are normal. He exhibits no distension. There is no tenderness. Musculoskeletal: Normal range of motion. He exhibits no edema. Feet:    Lymphadenopathy:     He has no cervical adenopathy. Neurological: He is alert and oriented to person, place, and time. Skin: Skin is warm and dry. Psychiatric: He has a normal mood and affect. Nursing note and vitals reviewed.

## 2018-10-02 DIAGNOSIS — C61 PROSTATE CA (HCC): ICD-10-CM

## 2018-10-02 LAB
ANION GAP SERPL CALCULATED.3IONS-SCNC: 13 MEQ/L (ref 7–13)
BUN BLDV-MCNC: 36 MG/DL (ref 8–23)
CALCIUM SERPL-MCNC: 9.6 MG/DL (ref 8.6–10.2)
CHLORIDE BLD-SCNC: 108 MEQ/L (ref 98–107)
CO2: 23 MEQ/L (ref 22–29)
CREAT SERPL-MCNC: 1.5 MG/DL (ref 0.7–1.2)
GFR AFRICAN AMERICAN: 55.2
GFR NON-AFRICAN AMERICAN: 45.6
GLUCOSE BLD-MCNC: 106 MG/DL (ref 74–109)
POTASSIUM SERPL-SCNC: 4.5 MEQ/L (ref 3.5–5.1)
PROSTATE SPECIFIC ANTIGEN: <0.01 NG/ML (ref 0–6.22)
SODIUM BLD-SCNC: 144 MEQ/L (ref 132–144)

## 2018-10-04 ENCOUNTER — PROCEDURE VISIT (OUTPATIENT)
Dept: UROLOGY | Age: 73
End: 2018-10-04
Payer: COMMERCIAL

## 2018-10-04 VITALS
DIASTOLIC BLOOD PRESSURE: 80 MMHG | HEIGHT: 68 IN | SYSTOLIC BLOOD PRESSURE: 120 MMHG | BODY MASS INDEX: 29.55 KG/M2 | HEART RATE: 88 BPM | WEIGHT: 195 LBS

## 2018-10-04 DIAGNOSIS — C61 PROSTATE CA (HCC): ICD-10-CM

## 2018-10-04 DIAGNOSIS — C68.9 UROTHELIAL CARCINOMA (HCC): ICD-10-CM

## 2018-10-04 DIAGNOSIS — N28.9 RENAL INSUFFICIENCY: Primary | ICD-10-CM

## 2018-10-04 LAB
BILIRUBIN, POC: NORMAL
BLOOD URINE, POC: NORMAL
CLARITY, POC: CLEAR
COLOR, POC: YELLOW
GLUCOSE URINE, POC: NORMAL
KETONES, POC: NORMAL
LEUKOCYTE EST, POC: NORMAL
NITRITE, POC: NORMAL
PH, POC: 5
PROTEIN, POC: NORMAL
SPECIFIC GRAVITY, POC: 1.02
UROBILINOGEN, POC: 0.2

## 2018-10-04 PROCEDURE — 81003 URINALYSIS AUTO W/O SCOPE: CPT | Performed by: UROLOGY

## 2018-10-04 PROCEDURE — 99999 PR OFFICE/OUTPT VISIT,PROCEDURE ONLY: CPT | Performed by: UROLOGY

## 2018-10-04 PROCEDURE — 52000 CYSTOURETHROSCOPY: CPT | Performed by: UROLOGY

## 2018-10-04 RX ORDER — DOXYCYCLINE HYCLATE 100 MG/1
100 CAPSULE ORAL ONCE
Qty: 1 CAPSULE | Refills: 0 | COMMUNITY
Start: 2018-10-04 | End: 2018-10-04

## 2018-12-03 DIAGNOSIS — Z79.4 TYPE 2 DIABETES MELLITUS WITH OTHER SPECIFIED COMPLICATION, WITH LONG-TERM CURRENT USE OF INSULIN (HCC): Chronic | ICD-10-CM

## 2018-12-03 DIAGNOSIS — E11.69 TYPE 2 DIABETES MELLITUS WITH OTHER SPECIFIED COMPLICATION, WITH LONG-TERM CURRENT USE OF INSULIN (HCC): Chronic | ICD-10-CM

## 2018-12-03 LAB
ANION GAP SERPL CALCULATED.3IONS-SCNC: 13 MEQ/L (ref 7–13)
BUN BLDV-MCNC: 36 MG/DL (ref 8–23)
CALCIUM SERPL-MCNC: 10.1 MG/DL (ref 8.6–10.2)
CHLORIDE BLD-SCNC: 102 MEQ/L (ref 98–107)
CHOLESTEROL, TOTAL: 281 MG/DL (ref 0–199)
CO2: 25 MEQ/L (ref 22–29)
CREAT SERPL-MCNC: 1.65 MG/DL (ref 0.7–1.2)
CREATININE URINE: 136 MG/DL
GFR AFRICAN AMERICAN: 49.4
GFR NON-AFRICAN AMERICAN: 40.9
GLUCOSE BLD-MCNC: 92 MG/DL (ref 74–109)
HBA1C MFR BLD: 8.5 % (ref 4.8–5.9)
HDLC SERPL-MCNC: 50 MG/DL (ref 40–59)
LDL CHOLESTEROL CALCULATED: 180 MG/DL (ref 0–129)
MICROALBUMIN UR-MCNC: 5.2 MG/DL
MICROALBUMIN/CREAT UR-RTO: 38.2 MG/G (ref 0–30)
POTASSIUM SERPL-SCNC: 4.3 MEQ/L (ref 3.5–5.1)
SODIUM BLD-SCNC: 140 MEQ/L (ref 132–144)
TRIGL SERPL-MCNC: 253 MG/DL (ref 0–200)

## 2018-12-06 ENCOUNTER — OFFICE VISIT (OUTPATIENT)
Dept: ENDOCRINOLOGY | Age: 73
End: 2018-12-06
Payer: COMMERCIAL

## 2018-12-06 VITALS
SYSTOLIC BLOOD PRESSURE: 126 MMHG | HEIGHT: 68 IN | BODY MASS INDEX: 30.31 KG/M2 | WEIGHT: 200 LBS | DIASTOLIC BLOOD PRESSURE: 86 MMHG | HEART RATE: 85 BPM

## 2018-12-06 DIAGNOSIS — Z79.4 TYPE 2 DIABETES MELLITUS WITH OTHER SPECIFIED COMPLICATION, WITH LONG-TERM CURRENT USE OF INSULIN (HCC): Primary | Chronic | ICD-10-CM

## 2018-12-06 DIAGNOSIS — E11.69 TYPE 2 DIABETES MELLITUS WITH OTHER SPECIFIED COMPLICATION, WITH LONG-TERM CURRENT USE OF INSULIN (HCC): Primary | Chronic | ICD-10-CM

## 2018-12-06 DIAGNOSIS — Z23 NEED FOR VACCINATION: ICD-10-CM

## 2018-12-06 LAB — GLUCOSE BLD-MCNC: 110 MG/DL

## 2018-12-06 PROCEDURE — 2022F DILAT RTA XM EVC RTNOPTHY: CPT | Performed by: PHYSICIAN ASSISTANT

## 2018-12-06 PROCEDURE — 4040F PNEUMOC VAC/ADMIN/RCVD: CPT | Performed by: PHYSICIAN ASSISTANT

## 2018-12-06 PROCEDURE — 99214 OFFICE O/P EST MOD 30 MIN: CPT | Performed by: PHYSICIAN ASSISTANT

## 2018-12-06 PROCEDURE — G0008 ADMIN INFLUENZA VIRUS VAC: HCPCS | Performed by: PHYSICIAN ASSISTANT

## 2018-12-06 PROCEDURE — 1123F ACP DISCUSS/DSCN MKR DOCD: CPT | Performed by: PHYSICIAN ASSISTANT

## 2018-12-06 PROCEDURE — 82962 GLUCOSE BLOOD TEST: CPT | Performed by: PHYSICIAN ASSISTANT

## 2018-12-06 PROCEDURE — 90670 PCV13 VACCINE IM: CPT | Performed by: PHYSICIAN ASSISTANT

## 2018-12-06 PROCEDURE — G0009 ADMIN PNEUMOCOCCAL VACCINE: HCPCS | Performed by: PHYSICIAN ASSISTANT

## 2018-12-06 PROCEDURE — G8417 CALC BMI ABV UP PARAM F/U: HCPCS | Performed by: PHYSICIAN ASSISTANT

## 2018-12-06 PROCEDURE — 90662 IIV NO PRSV INCREASED AG IM: CPT | Performed by: PHYSICIAN ASSISTANT

## 2018-12-06 PROCEDURE — G8427 DOCREV CUR MEDS BY ELIG CLIN: HCPCS | Performed by: PHYSICIAN ASSISTANT

## 2018-12-06 PROCEDURE — G8482 FLU IMMUNIZE ORDER/ADMIN: HCPCS | Performed by: PHYSICIAN ASSISTANT

## 2018-12-06 PROCEDURE — 3017F COLORECTAL CA SCREEN DOC REV: CPT | Performed by: PHYSICIAN ASSISTANT

## 2018-12-06 PROCEDURE — 4004F PT TOBACCO SCREEN RCVD TLK: CPT | Performed by: PHYSICIAN ASSISTANT

## 2018-12-06 PROCEDURE — 3045F PR MOST RECENT HEMOGLOBIN A1C LEVEL 7.0-9.0%: CPT | Performed by: PHYSICIAN ASSISTANT

## 2018-12-06 PROCEDURE — 1101F PT FALLS ASSESS-DOCD LE1/YR: CPT | Performed by: PHYSICIAN ASSISTANT

## 2018-12-06 ASSESSMENT — ENCOUNTER SYMPTOMS
SINUS PRESSURE: 0
COUGH: 0
EYE PAIN: 0
BACK PAIN: 1
NAUSEA: 0
SHORTNESS OF BREATH: 0
EYE REDNESS: 0
SORE THROAT: 0
RHINORRHEA: 0
WHEEZING: 0
ABDOMINAL PAIN: 0
VOMITING: 0
DIARRHEA: 0

## 2018-12-06 NOTE — PATIENT INSTRUCTIONS
Your target glucose is 100-150    Endocrinology-diabetes    1. Check your blood sugars 4 times a day, before meals and at night  2. Document these numbers and a blood glucose log and bring them with you to your follow-up appointment. 3. Do not take your mealtime insulin if your blood sugars less than 100  4. Call our office if you have blood sugars less than 80 or greater then 200 on two or more occasions  5. Call our office if you have any questions regarding your blood sugars or insulin dosing regiment  6. Signs of low blood sugar include sweating , heart racing, dizziness and weakness. Check your blood sugar if you have any of these symptoms.

## 2019-03-11 DIAGNOSIS — E11.69 TYPE 2 DIABETES MELLITUS WITH OTHER SPECIFIED COMPLICATION, WITH LONG-TERM CURRENT USE OF INSULIN (HCC): Chronic | ICD-10-CM

## 2019-03-11 DIAGNOSIS — Z79.4 TYPE 2 DIABETES MELLITUS WITH OTHER SPECIFIED COMPLICATION, WITH LONG-TERM CURRENT USE OF INSULIN (HCC): Chronic | ICD-10-CM

## 2019-03-11 LAB
ANION GAP SERPL CALCULATED.3IONS-SCNC: 10 MEQ/L (ref 9–15)
BUN BLDV-MCNC: 32 MG/DL (ref 8–23)
CALCIUM SERPL-MCNC: 9.1 MG/DL (ref 8.5–9.9)
CHLORIDE BLD-SCNC: 108 MEQ/L (ref 95–107)
CO2: 23 MEQ/L (ref 20–31)
CREAT SERPL-MCNC: 1.76 MG/DL (ref 0.7–1.2)
GFR AFRICAN AMERICAN: 45.9
GFR NON-AFRICAN AMERICAN: 37.9
GLUCOSE BLD-MCNC: 112 MG/DL (ref 70–99)
HBA1C MFR BLD: 10.2 % (ref 4.8–5.9)
POTASSIUM SERPL-SCNC: 4.5 MEQ/L (ref 3.4–4.9)
SODIUM BLD-SCNC: 141 MEQ/L (ref 135–144)

## 2019-03-15 ENCOUNTER — OFFICE VISIT (OUTPATIENT)
Dept: ENDOCRINOLOGY | Age: 74
End: 2019-03-15
Payer: COMMERCIAL

## 2019-03-15 VITALS
OXYGEN SATURATION: 99 % | HEART RATE: 70 BPM | WEIGHT: 203 LBS | DIASTOLIC BLOOD PRESSURE: 84 MMHG | BODY MASS INDEX: 30.87 KG/M2 | SYSTOLIC BLOOD PRESSURE: 149 MMHG | TEMPERATURE: 98 F

## 2019-03-15 DIAGNOSIS — Z79.4 TYPE 2 DIABETES MELLITUS WITH OTHER SPECIFIED COMPLICATION, WITH LONG-TERM CURRENT USE OF INSULIN (HCC): Primary | Chronic | ICD-10-CM

## 2019-03-15 DIAGNOSIS — E11.42 DIABETIC POLYNEUROPATHY ASSOCIATED WITH TYPE 2 DIABETES MELLITUS (HCC): Chronic | ICD-10-CM

## 2019-03-15 DIAGNOSIS — E11.69 TYPE 2 DIABETES MELLITUS WITH OTHER SPECIFIED COMPLICATION, WITH LONG-TERM CURRENT USE OF INSULIN (HCC): Primary | Chronic | ICD-10-CM

## 2019-03-15 PROCEDURE — 4040F PNEUMOC VAC/ADMIN/RCVD: CPT | Performed by: PHYSICIAN ASSISTANT

## 2019-03-15 PROCEDURE — 3017F COLORECTAL CA SCREEN DOC REV: CPT | Performed by: PHYSICIAN ASSISTANT

## 2019-03-15 PROCEDURE — G8482 FLU IMMUNIZE ORDER/ADMIN: HCPCS | Performed by: PHYSICIAN ASSISTANT

## 2019-03-15 PROCEDURE — 99214 OFFICE O/P EST MOD 30 MIN: CPT | Performed by: PHYSICIAN ASSISTANT

## 2019-03-15 PROCEDURE — 2022F DILAT RTA XM EVC RTNOPTHY: CPT | Performed by: PHYSICIAN ASSISTANT

## 2019-03-15 PROCEDURE — 1123F ACP DISCUSS/DSCN MKR DOCD: CPT | Performed by: PHYSICIAN ASSISTANT

## 2019-03-15 PROCEDURE — 1101F PT FALLS ASSESS-DOCD LE1/YR: CPT | Performed by: PHYSICIAN ASSISTANT

## 2019-03-15 PROCEDURE — G8427 DOCREV CUR MEDS BY ELIG CLIN: HCPCS | Performed by: PHYSICIAN ASSISTANT

## 2019-03-15 PROCEDURE — G8417 CALC BMI ABV UP PARAM F/U: HCPCS | Performed by: PHYSICIAN ASSISTANT

## 2019-03-15 PROCEDURE — 1036F TOBACCO NON-USER: CPT | Performed by: PHYSICIAN ASSISTANT

## 2019-03-15 PROCEDURE — 3046F HEMOGLOBIN A1C LEVEL >9.0%: CPT | Performed by: PHYSICIAN ASSISTANT

## 2019-03-15 ASSESSMENT — ENCOUNTER SYMPTOMS
SORE THROAT: 0
EYE REDNESS: 0
DIARRHEA: 0
ABDOMINAL PAIN: 0
EYE PAIN: 0
BACK PAIN: 1
NAUSEA: 0
RHINORRHEA: 0
COUGH: 0
SINUS PRESSURE: 0
SHORTNESS OF BREATH: 0
VOMITING: 0
WHEEZING: 0

## 2019-05-02 DIAGNOSIS — Z79.4 TYPE 2 DIABETES MELLITUS WITH OTHER SPECIFIED COMPLICATION, WITH LONG-TERM CURRENT USE OF INSULIN (HCC): Chronic | ICD-10-CM

## 2019-05-02 DIAGNOSIS — E11.69 TYPE 2 DIABETES MELLITUS WITH OTHER SPECIFIED COMPLICATION, WITH LONG-TERM CURRENT USE OF INSULIN (HCC): Chronic | ICD-10-CM

## 2019-06-21 ENCOUNTER — OFFICE VISIT (OUTPATIENT)
Dept: ENDOCRINOLOGY | Age: 74
End: 2019-06-21
Payer: COMMERCIAL

## 2019-06-21 VITALS
BODY MASS INDEX: 30.77 KG/M2 | DIASTOLIC BLOOD PRESSURE: 74 MMHG | SYSTOLIC BLOOD PRESSURE: 156 MMHG | HEART RATE: 64 BPM | WEIGHT: 203 LBS | HEIGHT: 68 IN

## 2019-06-21 DIAGNOSIS — Z79.4 TYPE 2 DIABETES MELLITUS WITH OTHER SPECIFIED COMPLICATION, WITH LONG-TERM CURRENT USE OF INSULIN (HCC): Chronic | ICD-10-CM

## 2019-06-21 DIAGNOSIS — E78.00 HYPERCHOLESTEREMIA: Primary | Chronic | ICD-10-CM

## 2019-06-21 DIAGNOSIS — E11.69 TYPE 2 DIABETES MELLITUS WITH OTHER SPECIFIED COMPLICATION, WITH LONG-TERM CURRENT USE OF INSULIN (HCC): Chronic | ICD-10-CM

## 2019-06-21 LAB
CHP ED QC CHECK: NORMAL
GLUCOSE BLD-MCNC: 85 MG/DL
HBA1C MFR BLD: 7.9 %

## 2019-06-21 PROCEDURE — 1123F ACP DISCUSS/DSCN MKR DOCD: CPT | Performed by: PHYSICIAN ASSISTANT

## 2019-06-21 PROCEDURE — 4040F PNEUMOC VAC/ADMIN/RCVD: CPT | Performed by: PHYSICIAN ASSISTANT

## 2019-06-21 PROCEDURE — 83036 HEMOGLOBIN GLYCOSYLATED A1C: CPT | Performed by: PHYSICIAN ASSISTANT

## 2019-06-21 PROCEDURE — 3017F COLORECTAL CA SCREEN DOC REV: CPT | Performed by: PHYSICIAN ASSISTANT

## 2019-06-21 PROCEDURE — G8427 DOCREV CUR MEDS BY ELIG CLIN: HCPCS | Performed by: PHYSICIAN ASSISTANT

## 2019-06-21 PROCEDURE — G8417 CALC BMI ABV UP PARAM F/U: HCPCS | Performed by: PHYSICIAN ASSISTANT

## 2019-06-21 PROCEDURE — 82962 GLUCOSE BLOOD TEST: CPT | Performed by: PHYSICIAN ASSISTANT

## 2019-06-21 PROCEDURE — 1036F TOBACCO NON-USER: CPT | Performed by: PHYSICIAN ASSISTANT

## 2019-06-21 PROCEDURE — 2022F DILAT RTA XM EVC RTNOPTHY: CPT | Performed by: PHYSICIAN ASSISTANT

## 2019-06-21 PROCEDURE — 99214 OFFICE O/P EST MOD 30 MIN: CPT | Performed by: PHYSICIAN ASSISTANT

## 2019-06-21 PROCEDURE — 3045F PR MOST RECENT HEMOGLOBIN A1C LEVEL 7.0-9.0%: CPT | Performed by: PHYSICIAN ASSISTANT

## 2019-06-21 ASSESSMENT — ENCOUNTER SYMPTOMS
NAUSEA: 0
COUGH: 0
SINUS PRESSURE: 0
SORE THROAT: 0
BACK PAIN: 1
WHEEZING: 0
RHINORRHEA: 0
VOMITING: 0
EYE PAIN: 0
EYE REDNESS: 0
DIARRHEA: 0
ABDOMINAL PAIN: 0
SHORTNESS OF BREATH: 0

## 2019-06-21 NOTE — PROGRESS NOTES
Assessment:       Diagnosis Orders   1. Hypercholesteremia  Lipid Panel   2. Type 2 diabetes mellitus with other specified complication, with long-term current use of insulin (HCC)  insulin aspart protamine-insulin aspart (NOVOLOG MIX 70/30 FLEXPEN) (70-30) 100 UNIT/ML injection    Insulin Pen Needle (NOVOFINE) 32G X 6 MM MISC    blood glucose test strips (ONE TOUCH ULTRA TEST) strip    POCT Glucose    POCT glycosylated hemoglobin (Hb A1C)    Basic Metabolic Panel    Hemoglobin A1C    Lipid Panel    Magnesium         PLAN:     1. Continue Novolin 70/30, 12 units AM, 15 units before dinner (was taking before bed with post prandial dinner highs)   2. Average AM glucose 120-130, A1C increased to 10.2  3. Follow up in 3 months  4. Monitor BG 4 times daily, document and bring to visit. 5. Post prandial glucose elevations likely   6. Chronic renal failure, follows up with nephrologist regularly        Orders Placed This Encounter   Procedures    Basic Metabolic Panel     Standing Status:   Future     Standing Expiration Date:   6/20/2020    Hemoglobin A1C     Standing Status:   Future     Standing Expiration Date:   6/20/2020    Lipid Panel     Standing Status:   Future     Standing Expiration Date:   6/20/2020     Order Specific Question:   Is Patient Fasting?/# of Hours     Answer:   yes    Magnesium     Standing Status:   Future     Standing Expiration Date:   6/21/2020    POCT Glucose    POCT glycosylated hemoglobin (Hb A1C)     Orders Placed This Encounter   Medications    insulin aspart protamine-insulin aspart (NOVOLOG MIX 70/30 FLEXPEN) (70-30) 100 UNIT/ML injection     Sig: 15 units in the morning, 15 units before dinner.  Please give 3 boxes for a 90 day supply     Dispense:  15 pen     Refill:  3    Insulin Pen Needle (NOVOFINE) 32G X 6 MM MISC     Sig: USE TWICE DAILY, DX: E11.65, IDDM     Dispense:  200 each     Refill:  3    blood glucose test strips (ONE TOUCH ULTRA TEST) strip     Sig: Pt test 2x abused: Not on file     Physically abused: Not on file     Forced sexual activity: Not on file   Other Topics Concern    Not on file   Social History Narrative    Not on file     Family History   Problem Relation Age of Onset    Diabetes Mother          Review of Systems   Constitutional: Negative for chills, fatigue and fever. HENT: Negative for congestion, ear pain, postnasal drip, rhinorrhea, sinus pressure and sore throat. Eyes: Negative for pain and redness. Respiratory: Negative for cough, shortness of breath and wheezing. Cardiovascular: Negative for chest pain, palpitations and leg swelling. Gastrointestinal: Negative for abdominal pain, diarrhea, nausea and vomiting. Dysphagia with solid foods   Occasional heart burn   Endocrine: Negative for cold intolerance, heat intolerance, polydipsia, polyphagia and polyuria. Genitourinary: Negative for difficulty urinating and hematuria. Retention   Musculoskeletal: Positive for back pain. Negative for arthralgias. Skin: Negative for rash. Neurological: Positive for tremors (with hypoglycemia) and weakness (with hypoglycemia). Negative for dizziness and headaches. Psychiatric/Behavioral: Negative for agitation. Objective:   Physical Exam   Constitutional: He is oriented to person, place, and time. He appears well-developed and well-nourished. HENT:   Head: Normocephalic and atraumatic. Eyes: Conjunctivae and EOM are normal.   Neck: Normal range of motion. Neck supple. No tracheal deviation present. No thyromegaly present. Cardiovascular: Normal rate, regular rhythm, S1 normal, S2 normal and normal heart sounds. Pulses:       Carotid pulses are 2+ on the right side, and 2+ on the left side. Radial pulses are 2+ on the right side, and 2+ on the left side. Pulmonary/Chest: Effort normal and breath sounds normal.   Abdominal: Soft. Bowel sounds are normal.   Musculoskeletal: Normal range of motion.  He exhibits no edema. Neurological: He is alert and oriented to person, place, and time. Skin: Skin is warm and dry. No erythema. Psychiatric: He has a normal mood and affect. Nursing note and vitals reviewed.

## 2019-06-21 NOTE — PATIENT INSTRUCTIONS
Endocrinology-diabetes    1. Check your blood sugars 4 times a day, before meals and at night  2. Document these numbers and a blood glucose log and bring them with you to your follow-up appointment. 3. Do not take your mealtime insulin if your blood sugars less than 100  4. Call our office if you have blood sugars less than 80 or greater then 200 on two or more occasions  5. Call our office if you have any questions regarding your blood sugars or insulin dosing regiment  6. Signs of low blood sugar include sweating , heart racing, dizziness and weakness. Check your blood sugar if you have any of these symptoms. Get labs drawn one week before your next appointment    Novolin 70/30, 12 units before breakfast, 15 units before dinner    Try taking Over the counter Magnesium 100 mg (may take up to 250 mg) tablets before bed for leg cramps. They are in the vitamin section.

## 2019-09-18 DIAGNOSIS — E11.69 TYPE 2 DIABETES MELLITUS WITH OTHER SPECIFIED COMPLICATION, WITH LONG-TERM CURRENT USE OF INSULIN (HCC): Chronic | ICD-10-CM

## 2019-09-18 DIAGNOSIS — E78.00 HYPERCHOLESTEREMIA: Chronic | ICD-10-CM

## 2019-09-18 DIAGNOSIS — Z79.4 TYPE 2 DIABETES MELLITUS WITH OTHER SPECIFIED COMPLICATION, WITH LONG-TERM CURRENT USE OF INSULIN (HCC): Chronic | ICD-10-CM

## 2019-09-18 LAB
ANION GAP SERPL CALCULATED.3IONS-SCNC: 11 MEQ/L (ref 9–15)
BUN BLDV-MCNC: 31 MG/DL (ref 8–23)
CALCIUM SERPL-MCNC: 9 MG/DL (ref 8.5–9.9)
CHLORIDE BLD-SCNC: 109 MEQ/L (ref 95–107)
CO2: 21 MEQ/L (ref 20–31)
CREAT SERPL-MCNC: 1.58 MG/DL (ref 0.7–1.2)
GFR AFRICAN AMERICAN: 51.9
GFR NON-AFRICAN AMERICAN: 42.9
GLUCOSE BLD-MCNC: 123 MG/DL (ref 70–99)
HBA1C MFR BLD: 8.6 % (ref 4.8–5.9)
MAGNESIUM: 2.1 MG/DL (ref 1.7–2.4)
POTASSIUM SERPL-SCNC: 5.2 MEQ/L (ref 3.4–4.9)
SODIUM BLD-SCNC: 141 MEQ/L (ref 135–144)

## 2019-09-25 ENCOUNTER — OFFICE VISIT (OUTPATIENT)
Dept: ENDOCRINOLOGY | Age: 74
End: 2019-09-25
Payer: COMMERCIAL

## 2019-09-25 VITALS
WEIGHT: 205 LBS | BODY MASS INDEX: 31.07 KG/M2 | DIASTOLIC BLOOD PRESSURE: 74 MMHG | SYSTOLIC BLOOD PRESSURE: 176 MMHG | HEIGHT: 68 IN | HEART RATE: 64 BPM

## 2019-09-25 DIAGNOSIS — E11.69 TYPE 2 DIABETES MELLITUS WITH OTHER SPECIFIED COMPLICATION, WITH LONG-TERM CURRENT USE OF INSULIN (HCC): Primary | Chronic | ICD-10-CM

## 2019-09-25 DIAGNOSIS — E78.00 HYPERCHOLESTEREMIA: Chronic | ICD-10-CM

## 2019-09-25 DIAGNOSIS — Z79.4 TYPE 2 DIABETES MELLITUS WITH OTHER SPECIFIED COMPLICATION, WITH LONG-TERM CURRENT USE OF INSULIN (HCC): Primary | Chronic | ICD-10-CM

## 2019-09-25 LAB
CHP ED QC CHECK: NORMAL
GLUCOSE BLD-MCNC: 284 MG/DL

## 2019-09-25 PROCEDURE — 82962 GLUCOSE BLOOD TEST: CPT | Performed by: PHYSICIAN ASSISTANT

## 2019-09-25 PROCEDURE — 3045F PR MOST RECENT HEMOGLOBIN A1C LEVEL 7.0-9.0%: CPT | Performed by: PHYSICIAN ASSISTANT

## 2019-09-25 PROCEDURE — 1123F ACP DISCUSS/DSCN MKR DOCD: CPT | Performed by: PHYSICIAN ASSISTANT

## 2019-09-25 PROCEDURE — 99214 OFFICE O/P EST MOD 30 MIN: CPT | Performed by: PHYSICIAN ASSISTANT

## 2019-09-25 PROCEDURE — G8427 DOCREV CUR MEDS BY ELIG CLIN: HCPCS | Performed by: PHYSICIAN ASSISTANT

## 2019-09-25 PROCEDURE — 4040F PNEUMOC VAC/ADMIN/RCVD: CPT | Performed by: PHYSICIAN ASSISTANT

## 2019-09-25 PROCEDURE — G8417 CALC BMI ABV UP PARAM F/U: HCPCS | Performed by: PHYSICIAN ASSISTANT

## 2019-09-25 PROCEDURE — 3017F COLORECTAL CA SCREEN DOC REV: CPT | Performed by: PHYSICIAN ASSISTANT

## 2019-09-25 PROCEDURE — 2022F DILAT RTA XM EVC RTNOPTHY: CPT | Performed by: PHYSICIAN ASSISTANT

## 2019-09-25 PROCEDURE — 1036F TOBACCO NON-USER: CPT | Performed by: PHYSICIAN ASSISTANT

## 2019-09-25 ASSESSMENT — ENCOUNTER SYMPTOMS
NAUSEA: 0
VOMITING: 0
ABDOMINAL PAIN: 0
BACK PAIN: 1
EYE PAIN: 0
COUGH: 0
DIARRHEA: 0
SHORTNESS OF BREATH: 0
RHINORRHEA: 0
EYE REDNESS: 0
WHEEZING: 0
SORE THROAT: 0
SINUS PRESSURE: 0

## 2019-09-25 NOTE — PROGRESS NOTES
Assessment:       Diagnosis Orders   1. Type 2 diabetes mellitus with other specified complication, with long-term current use of insulin ([facility])  POCT Glucose    Basic Metabolic Panel    Hemoglobin A1C    HM DIABETES FOOT EXAM    Lipid Panel   2. Hypercholesteremia  Lipid Panel         PLAN:     1. Continue Novolin 70/30, 12 units AM, Increase insulin 18 units before dinner (was still taking before bed with post prandial dinner highs)   2. Average AM glucose 120-130, bedtime avg 200-250  3. Follow up in 3 months  4. Monitor BG 2 times daily, document and bring to visit. 5. Post prandial glucose elevations   6. Chronic renal failure, follows up with nephrologist regularly, slightly improved GFR   7. Is taking Naproxen twice daily, instructed to only take as needed for pain         Orders Placed This Encounter   Procedures    Basic Metabolic Panel     Standing Status:   Future     Standing Expiration Date:   9/24/2020    Hemoglobin A1C     Standing Status:   Future     Standing Expiration Date:   9/24/2020    Lipid Panel     Standing Status:   Future     Standing Expiration Date:   9/24/2020     Order Specific Question:   Is Patient Fasting?/# of Hours     Answer:   8    POCT Glucose     DIABETES FOOT EXAM     No orders of the defined types were placed in this encounter. No follow-ups on file. Subjective:     Chief Complaint   Patient presents with    Diabetes     Vitals:    09/25/19 0918   BP: (!) 176/74   Site: Right Upper Arm   Position: Sitting   Cuff Size: Large Adult   Pulse: 64   Weight: 205 lb (93 kg)   Height: 5' 8\" (1.727 m)     Wt Readings from Last 3 Encounters:   09/25/19 205 lb (93 kg)   06/21/19 203 lb (92.1 kg)   03/15/19 203 lb (92.1 kg)     Diabetes   He presents for his follow-up diabetic visit. He has type 2 diabetes mellitus. The initial diagnosis of diabetes was made 20 years ago. His disease course has been stable.  Hypoglycemia symptoms include sweats and tremors (with hypoglycemia). Pertinent negatives for hypoglycemia include no dizziness or headaches. Associated symptoms include weakness (with hypoglycemia). Pertinent negatives for diabetes include no chest pain, no fatigue, no polydipsia, no polyphagia and no polyuria. There are no hypoglycemic complications. Symptoms are stable. Diabetic complications include peripheral neuropathy. Risk factors for coronary artery disease include hypertension, male sex and diabetes mellitus. Current diabetic treatment includes insulin injections. He is compliant with treatment all of the time. His weight is stable. He is following a generally healthy diet. When asked about meal planning, he reported none. He has not had a previous visit with a dietitian. He participates in exercise daily. There is no change in his home blood glucose trend. Past Medical History:   Diagnosis Date    Arthritis     Diabetic neuropathy ([facility]     Elevated PSA     Hyperlipidemia     Hypertension     Osteoarthritis     [facility]2017    Type II diabetes mellitus, uncontrolled ([facility]        Current Outpatient Medications:     insulin aspart protamine-insulin aspart (NOVOLOG MIX 70/30 FLEXPEN) (70-30) 100 UNIT/ML injection, 15 units in the morning, 15 units before dinner. Please give 3 boxes for a 90 day supply, Disp: 15 pen, Rfl: 3    Insulin Pen Needle (NOVOFINE) 32G X 6 MM MISC, USE TWICE DAILY, DX: E11.65, IDDM, Disp: 200 each, Rfl: 3    blood glucose test strips (ONE TOUCH ULTRA TEST) strip, Pt test 2x daily dx e11.65, Disp: 200 each, Rfl: 3    amLODIPine (NORVASC) 5 MG tablet, TAKE 1 TABLET BY MOUTH DAILY, Disp: 90 tablet, Rfl: 1    hydrochlorothiazide (HYDRODIURIL) 25 MG tablet, TAKE 1 TABLET BY MOUTH DAILY, Disp: 90 tablet, Rfl: 1    ranitidine (ZANTAC) 150 MG tablet, Take 150 mg by mouth 2 times daily. , Disp: , Rfl:   Lab Results   Component Value Date     09/18/2019    K 5.2 (H) 09/18/2019     (H) 09/18/2019 CO2 21 2019    BUN 31 (H) 2019    CREATININE 1.58 (H) 2019    GLUCOSE 284 2019    CALCIUM 9.0 2019    PROT 6.0 (L) 2018    LABALBU 3.4 (L) 2018    BILITOT 0.4 2018    ALKPHOS 120 (H) 2018    AST 10 2018    ALT 15 2018    LABGLOM 42.9 (L) 2019    GFRAA 51.9 (L) 2019    GLOB 2.6 2018     Lab Results   Component Value Date     2019    K 5.2 2019     2019    CO2 21 2019    BUN 31 2019    CREATININE 1.58 2019    GLUCOSE 284 2019    GLUCOSE 218 10/12/2011    CALCIUM 9.0 2019      Lab Results   Component Value Date    LABA1C 8.6 (H) 2019    LABA1C 7.9 2019    LABA1C 10.2 (H) 2019       Lab Results   Component Value Date    CHOL 281 (H) 2018    CHOL 158 2015    CHOL 173 2013     Lab Results   Component Value Date    TRIG 253 (H) 2018    TRIG 145 2015    TRIG 169 (H) 2013     Lab Results   Component Value Date    HDL 50 2018    HDL 46 2015    HDL 51 2013     Lab Results   Component Value Date    LDLCALC 180 (H) 2018    LDLCALC 83 2015    LDLCALC 88 2013     No results found for: LABVLDL, VLDL  Lab Results   Component Value Date    CHOLHDLRATIO 3.5 10/10/2011     No results found for: TESTOSTERONE  Lab Results   Component Value Date    TSH 2.190 2015     Allergies   Allergen Reactions    Acetaminophen     Glipizide     Propoxyphene And Methadone [Methadone]        Past Surgical History:   Procedure Laterality Date    CHOLECYSTECTOMY      CIRCUMCISION, NON-      OTHER SURGICAL HISTORY      gallstones removed    TONSILLECTOMY      ULTRASOUND PROSTATE/TRANSRECTAL N/A 2017    PROSTATE TRANSRECTAL ULTRASOUND BIOPSY performed by Sammi Miller MD at [address] History     Socioeconomic History    Marital status:      Spouse name: Not on file    Number of with solid foods   Occasional heart burn   Endocrine: Negative for cold intolerance, heat intolerance, polydipsia, polyphagia and polyuria. Genitourinary: Negative for difficulty urinating and hematuria. Retention   Musculoskeletal: Positive for back pain (chronic). Negative for arthralgias. Skin: Negative for rash. Neurological: Positive for tremors (with hypoglycemia) and weakness (with hypoglycemia). Negative for dizziness and headaches. Psychiatric/Behavioral: Negative for agitation. Objective:   Physical Exam   Constitutional: He is oriented to person, place, and time. He appears well-developed and well-nourished. HENT:   Head: Normocephalic and atraumatic. Eyes: Conjunctivae and EOM are normal.   Neck: Normal range of motion. Neck supple. No tracheal deviation present. No thyromegaly present. Cardiovascular: Normal rate, regular rhythm, S1 normal, S2 normal and normal heart sounds. Pulses:       Carotid pulses are 2+ on the right side, and 2+ on the left side. Radial pulses are 2+ on the right side, and 2+ on the left side. Pulmonary/Chest: Effort normal and breath sounds normal.   Abdominal: Soft. Bowel sounds are normal.   Musculoskeletal: Normal range of motion. He exhibits no edema. Foot exam complete, 1+ palpable pulses bilat, thickened yellow toenails, no wounds or ulcers    Neurological: He is alert and oriented to person, place, and time. Skin: Skin is warm and dry. No erythema. Psychiatric: He has a normal mood and affect. Nursing note and vitals reviewed.

## 2019-09-30 DIAGNOSIS — C61 PROSTATE CA (HCC): ICD-10-CM

## 2019-09-30 LAB — PROSTATE SPECIFIC ANTIGEN: 0.06 NG/ML (ref 0–6.22)

## 2019-10-04 ENCOUNTER — OFFICE VISIT (OUTPATIENT)
Dept: UROLOGY | Age: 74
End: 2019-10-04
Payer: COMMERCIAL

## 2019-10-04 VITALS
OXYGEN SATURATION: 98 % | DIASTOLIC BLOOD PRESSURE: 80 MMHG | HEART RATE: 68 BPM | WEIGHT: 200 LBS | BODY MASS INDEX: 32.14 KG/M2 | HEIGHT: 66 IN | SYSTOLIC BLOOD PRESSURE: 132 MMHG

## 2019-10-04 DIAGNOSIS — C68.9 UROTHELIAL CARCINOMA (HCC): ICD-10-CM

## 2019-10-04 DIAGNOSIS — C61 PROSTATE CA (HCC): Primary | ICD-10-CM

## 2019-10-04 LAB
BILIRUBIN, POC: ABNORMAL
BLOOD URINE, POC: ABNORMAL
CLARITY, POC: CLEAR
COLOR, POC: YELLOW
GLUCOSE URINE, POC: ABNORMAL
KETONES, POC: ABNORMAL
LEUKOCYTE EST, POC: ABNORMAL
NITRITE, POC: ABNORMAL
PH, POC: 5.5
PROTEIN, POC: ABNORMAL
SPECIFIC GRAVITY, POC: 1.02
UROBILINOGEN, POC: 0.2

## 2019-10-04 PROCEDURE — 52000 CYSTOURETHROSCOPY: CPT | Performed by: UROLOGY

## 2019-10-04 PROCEDURE — 81003 URINALYSIS AUTO W/O SCOPE: CPT | Performed by: UROLOGY

## 2019-10-04 RX ORDER — SULFAMETHOXAZOLE AND TRIMETHOPRIM 800; 160 MG/1; MG/1
1 TABLET ORAL ONCE
Qty: 1 TABLET | Refills: 0 | COMMUNITY
Start: 2019-10-04 | End: 2019-10-04

## 2019-11-07 DIAGNOSIS — Z79.4 TYPE 2 DIABETES MELLITUS WITH OTHER SPECIFIED COMPLICATION, WITH LONG-TERM CURRENT USE OF INSULIN (HCC): Chronic | ICD-10-CM

## 2019-11-07 DIAGNOSIS — E11.69 TYPE 2 DIABETES MELLITUS WITH OTHER SPECIFIED COMPLICATION, WITH LONG-TERM CURRENT USE OF INSULIN (HCC): Chronic | ICD-10-CM

## 2019-11-11 DIAGNOSIS — E11.69 TYPE 2 DIABETES MELLITUS WITH OTHER SPECIFIED COMPLICATION, WITH LONG-TERM CURRENT USE OF INSULIN (HCC): Chronic | ICD-10-CM

## 2019-11-11 DIAGNOSIS — Z79.4 TYPE 2 DIABETES MELLITUS WITH OTHER SPECIFIED COMPLICATION, WITH LONG-TERM CURRENT USE OF INSULIN (HCC): Chronic | ICD-10-CM

## 2020-01-13 DIAGNOSIS — C61 PROSTATE CA (HCC): ICD-10-CM

## 2020-01-13 DIAGNOSIS — E11.69 TYPE 2 DIABETES MELLITUS WITH OTHER SPECIFIED COMPLICATION, WITH LONG-TERM CURRENT USE OF INSULIN (HCC): Chronic | ICD-10-CM

## 2020-01-13 DIAGNOSIS — E78.00 HYPERCHOLESTEREMIA: Chronic | ICD-10-CM

## 2020-01-13 DIAGNOSIS — Z79.4 TYPE 2 DIABETES MELLITUS WITH OTHER SPECIFIED COMPLICATION, WITH LONG-TERM CURRENT USE OF INSULIN (HCC): Chronic | ICD-10-CM

## 2020-01-13 LAB
ANION GAP SERPL CALCULATED.3IONS-SCNC: 11 MEQ/L (ref 9–15)
BUN BLDV-MCNC: 28 MG/DL (ref 8–23)
CALCIUM SERPL-MCNC: 9.2 MG/DL (ref 8.5–9.9)
CHLORIDE BLD-SCNC: 106 MEQ/L (ref 95–107)
CHOLESTEROL, TOTAL: 206 MG/DL (ref 0–199)
CO2: 22 MEQ/L (ref 20–31)
CREAT SERPL-MCNC: 1.61 MG/DL (ref 0.7–1.2)
GFR AFRICAN AMERICAN: 50.7
GFR NON-AFRICAN AMERICAN: 41.9
GLUCOSE BLD-MCNC: 136 MG/DL (ref 70–99)
HBA1C MFR BLD: 8.3 % (ref 4.8–5.9)
HDLC SERPL-MCNC: 47 MG/DL (ref 40–59)
LDL CHOLESTEROL CALCULATED: 108 MG/DL (ref 0–129)
POTASSIUM SERPL-SCNC: 5.4 MEQ/L (ref 3.4–4.9)
PROSTATE SPECIFIC ANTIGEN: 0.06 NG/ML (ref 0–6.22)
SODIUM BLD-SCNC: 139 MEQ/L (ref 135–144)
TRIGL SERPL-MCNC: 253 MG/DL (ref 0–150)

## 2020-01-16 ENCOUNTER — OFFICE VISIT (OUTPATIENT)
Dept: ENDOCRINOLOGY | Age: 75
End: 2020-01-16
Payer: COMMERCIAL

## 2020-01-16 VITALS
BODY MASS INDEX: 32.78 KG/M2 | WEIGHT: 204 LBS | HEIGHT: 66 IN | DIASTOLIC BLOOD PRESSURE: 78 MMHG | SYSTOLIC BLOOD PRESSURE: 155 MMHG | HEART RATE: 56 BPM

## 2020-01-16 LAB
CHP ED QC CHECK: NORMAL
GLUCOSE BLD-MCNC: 112 MG/DL

## 2020-01-16 PROCEDURE — 1123F ACP DISCUSS/DSCN MKR DOCD: CPT | Performed by: PHYSICIAN ASSISTANT

## 2020-01-16 PROCEDURE — G8484 FLU IMMUNIZE NO ADMIN: HCPCS | Performed by: PHYSICIAN ASSISTANT

## 2020-01-16 PROCEDURE — G8417 CALC BMI ABV UP PARAM F/U: HCPCS | Performed by: PHYSICIAN ASSISTANT

## 2020-01-16 PROCEDURE — 1036F TOBACCO NON-USER: CPT | Performed by: PHYSICIAN ASSISTANT

## 2020-01-16 PROCEDURE — G8427 DOCREV CUR MEDS BY ELIG CLIN: HCPCS | Performed by: PHYSICIAN ASSISTANT

## 2020-01-16 PROCEDURE — 99214 OFFICE O/P EST MOD 30 MIN: CPT | Performed by: PHYSICIAN ASSISTANT

## 2020-01-16 PROCEDURE — 4040F PNEUMOC VAC/ADMIN/RCVD: CPT | Performed by: PHYSICIAN ASSISTANT

## 2020-01-16 PROCEDURE — 82962 GLUCOSE BLOOD TEST: CPT | Performed by: PHYSICIAN ASSISTANT

## 2020-01-16 ASSESSMENT — ENCOUNTER SYMPTOMS
SINUS PRESSURE: 0
EYE PAIN: 0
NAUSEA: 0
SHORTNESS OF BREATH: 0
WHEEZING: 0
EYE REDNESS: 0
COUGH: 0
ABDOMINAL PAIN: 0
SORE THROAT: 0
RHINORRHEA: 0
VOMITING: 0
BACK PAIN: 1
DIARRHEA: 0

## 2020-01-16 NOTE — PROGRESS NOTES
Assessment:       Diagnosis Orders   1. Type 2 diabetes mellitus with other specified complication, with long-term current use of insulin ([facility])  POCT Glucose    Hemoglobin A1C    Comprehensive Metabolic Panel    insulin aspart protamine-insulin aspart (NOVOLOG MIX 70/30 FLEXPEN) (70-30) 100 UNIT/ML injection         PLAN:     1. Increase Novolin 70/30, 16 units AM, Increase insulin 18 units before dinner (was 15 units BID)   2. Average AM glucose 130-140, bedtime avg 200-250  3. Follow up in 3 months  4. Monitor BG 2 times daily, document and bring to visit. 5. Post prandial glucose elevations, slight increase in insulin dosing    6. Chronic renal failure, follows up with nephrologist regularly, slightly improved GFR   7. Is taking Naproxen twice daily, instructed to only take as needed for pain         Orders Placed This Encounter   Procedures    Hemoglobin A1C     Standing Status:   Future     Standing Expiration Date:   1/15/2021    Comprehensive Metabolic Panel     Standing Status:   Future     Standing Expiration Date:   2021    POCT Glucose     Orders Placed This Encounter   Medications    insulin aspart protamine-insulin aspart (NOVOLOG MIX 70/30 FLEXPEN) (70-30) 100 UNIT/ML injection     Si units in the morning, 18 units before dinner. Please give 3 boxes for a 90 day supply     Dispense:  15 pen     Refill:  3       No follow-ups on file. Subjective:     Chief Complaint   Patient presents with    Diabetes     Vitals:    20 1033   BP: (!) 155/78   Site: Left Upper Arm   Position: Sitting   Cuff Size: Large Adult   Pulse: 56   Weight: 204 lb (92.5 kg)   Height: 5' 6\" (1.676 m)     Wt Readings from Last 3 Encounters:   20 204 lb (92.5 kg)   10/04/19 200 lb (90.7 kg)   19 205 lb (93 kg)     Diabetes   He presents for his follow-up diabetic visit. He has type 2 diabetes mellitus. The initial diagnosis of diabetes was made 20 years ago. His disease course has been stable. Hypoglycemia symptoms include sweats and tremors (with hypoglycemia). Pertinent negatives for hypoglycemia include no dizziness or headaches. Associated symptoms include weakness (with hypoglycemia). Pertinent negatives for diabetes include no chest pain, no fatigue, no polydipsia, no polyphagia and no polyuria. There are no hypoglycemic complications. Symptoms are stable. Diabetic complications include peripheral neuropathy. Risk factors for coronary artery disease include hypertension, male sex and diabetes mellitus. Current diabetic treatment includes insulin injections. He is compliant with treatment all of the time. His weight is stable. He is following a generally healthy diet. When asked about meal planning, he reported none. He has not had a previous visit with a dietitian. He participates in exercise daily. There is no change in his home blood glucose trend. Past Medical History:   Diagnosis Date    Arthritis     Diabetic neuropathy ([facility]     Elevated PSA     Hyperlipidemia     Hypertension     Osteoarthritis     Prostate cancer ([facility] 03/2017    Type II diabetes mellitus, uncontrolled ([facility]        Current Outpatient Medications:     insulin aspart protamine-insulin aspart (NOVOLOG MIX 70/30 FLEXPEN) (70-30) 100 UNIT/ML injection, 16 units in the morning, 18 units before dinner. Please give 3 boxes for a 90 day supply, Disp: 15 pen, Rfl: 3    blood glucose test strips (ONE TOUCH ULTRA TEST) strip, USE TO TEST GLUCOSE FOUR TIMES DAILY, Disp: 300 strip, Rfl: 3    Insulin Pen Needle (NOVOFINE) 32G X 6 MM MISC, USE TWICE DAILY, DX: E11.65, IDDM, Disp: 200 each, Rfl: 3    amLODIPine (NORVASC) 5 MG tablet, TAKE 1 TABLET BY MOUTH DAILY, Disp: 90 tablet, Rfl: 1    hydrochlorothiazide (HYDRODIURIL) 25 MG tablet, TAKE 1 TABLET BY MOUTH DAILY, Disp: 90 tablet, Rfl: 1    ranitidine (ZANTAC) 150 MG tablet, Take 150 mg by mouth 2 times daily. , Disp: , Rfl:   Lab Results   Component Value Date     status:      Spouse name: Not on file    Number of children: Not on file    Years of education: Not on file    Highest education level: Not on file   Occupational History    Not on file   Social Needs    Financial resource strain: Not on file    Food insecurity:     Worry: Not on file     Inability: Not on file    Transportation needs:     Medical: Not on file     Non-medical: Not on file   Tobacco Use    Smoking status: Former Smoker     Packs/day: 0.25     Types: Cigarettes     Last attempt to quit: 1/3/2017     Years since quitting: 3.0    Smokeless tobacco: Never Used    Tobacco comment: couple cigarettes a day   Substance and Sexual Activity    Alcohol use: No     Comment: rarely- 1 beer monthly    Drug use: No    Sexual activity: Never   Lifestyle    Physical activity:     Days per week: Not on file     Minutes per session: Not on file    Stress: Not on file   Relationships    Social connections:     Talks on phone: Not on file     Gets together: Not on file     Attends [employer] witness service: Not on file     Active member of club or organization: Not on file     Attends meetings of clubs or organizations: Not on file     Relationship status: Not on file    Intimate partner violence:     Fear of current or ex partner: Not on file     Emotionally abused: Not on file     Physically abused: Not on file     Forced sexual activity: Not on file   Other Topics Concern    Not on file   Social History Narrative    Not on file     Family History   Problem Relation Age of Onset    Diabetes Mother          Review of Systems   Constitutional: Negative for chills, fatigue and fever. HENT: Negative for congestion, ear pain, postnasal drip, rhinorrhea, sinus pressure and sore throat. Eyes: Negative for pain and redness. Respiratory: Negative for cough, shortness of breath and wheezing. Cardiovascular: Negative for chest pain, palpitations and leg swelling.    Gastrointestinal: Negative for

## 2020-04-01 RX ORDER — INSULIN ASPART 100 [IU]/ML
INJECTION, SUSPENSION SUBCUTANEOUS
Qty: 15 PEN | Refills: 3 | Status: SHIPPED | OUTPATIENT
Start: 2020-04-01 | End: 2020-04-02 | Stop reason: CLARIF

## 2020-04-01 RX ORDER — INSULIN LISPRO 100 [IU]/ML
INJECTION, SUSPENSION SUBCUTANEOUS
Qty: 15 PEN | Refills: 3 | Status: SHIPPED | OUTPATIENT
Start: 2020-04-01 | End: 2020-12-11 | Stop reason: SDUPTHER

## 2020-04-16 DIAGNOSIS — E11.69 TYPE 2 DIABETES MELLITUS WITH OTHER SPECIFIED COMPLICATION, WITH LONG-TERM CURRENT USE OF INSULIN (HCC): Chronic | ICD-10-CM

## 2020-04-16 DIAGNOSIS — Z79.4 TYPE 2 DIABETES MELLITUS WITH OTHER SPECIFIED COMPLICATION, WITH LONG-TERM CURRENT USE OF INSULIN (HCC): Chronic | ICD-10-CM

## 2020-04-16 PROBLEM — N18.30 STAGE 3 CHRONIC KIDNEY DISEASE (HCC): Status: ACTIVE | Noted: 2020-04-16

## 2020-04-16 LAB
ALBUMIN SERPL-MCNC: 4.4 G/DL (ref 3.5–4.6)
ALP BLD-CCNC: 115 U/L (ref 35–104)
ALT SERPL-CCNC: 10 U/L (ref 0–41)
ANION GAP SERPL CALCULATED.3IONS-SCNC: 15 MEQ/L (ref 9–15)
AST SERPL-CCNC: 9 U/L (ref 0–40)
BILIRUB SERPL-MCNC: 0.3 MG/DL (ref 0.2–0.7)
BUN BLDV-MCNC: 36 MG/DL (ref 8–23)
CALCIUM SERPL-MCNC: 9.6 MG/DL (ref 8.5–9.9)
CHLORIDE BLD-SCNC: 101 MEQ/L (ref 95–107)
CO2: 22 MEQ/L (ref 20–31)
CREAT SERPL-MCNC: 1.93 MG/DL (ref 0.7–1.2)
GFR AFRICAN AMERICAN: 41.1
GFR NON-AFRICAN AMERICAN: 34
GLOBULIN: 3.3 G/DL (ref 2.3–3.5)
GLUCOSE BLD-MCNC: 168 MG/DL (ref 70–99)
HBA1C MFR BLD: 8.8 % (ref 4.8–5.9)
POTASSIUM SERPL-SCNC: 6.3 MEQ/L (ref 3.4–4.9)
SODIUM BLD-SCNC: 138 MEQ/L (ref 135–144)
TOTAL PROTEIN: 7.7 G/DL (ref 6.3–8)

## 2020-04-17 DIAGNOSIS — N18.30 STAGE 3 CHRONIC KIDNEY DISEASE (HCC): ICD-10-CM

## 2020-04-17 LAB
ANION GAP SERPL CALCULATED.3IONS-SCNC: 11 MEQ/L (ref 9–15)
BUN BLDV-MCNC: 47 MG/DL (ref 8–23)
CALCIUM SERPL-MCNC: 9.3 MG/DL (ref 8.5–9.9)
CHLORIDE BLD-SCNC: 102 MEQ/L (ref 95–107)
CO2: 23 MEQ/L (ref 20–31)
CREAT SERPL-MCNC: 2.01 MG/DL (ref 0.7–1.2)
GFR AFRICAN AMERICAN: 39.2
GFR NON-AFRICAN AMERICAN: 32.4
GLUCOSE BLD-MCNC: 218 MG/DL (ref 70–99)
POTASSIUM SERPL-SCNC: 5.7 MEQ/L (ref 3.4–4.9)
SODIUM BLD-SCNC: 136 MEQ/L (ref 135–144)

## 2020-04-21 ENCOUNTER — VIRTUAL VISIT (OUTPATIENT)
Dept: ENDOCRINOLOGY | Age: 75
End: 2020-04-21
Payer: COMMERCIAL

## 2020-04-21 PROCEDURE — 99442 PR PHYS/QHP TELEPHONE EVALUATION 11-20 MIN: CPT | Performed by: PHYSICIAN ASSISTANT

## 2020-04-21 ASSESSMENT — ENCOUNTER SYMPTOMS
NAUSEA: 0
EYE REDNESS: 0
EYE PAIN: 0
RHINORRHEA: 0
SHORTNESS OF BREATH: 0
WHEEZING: 0
VOMITING: 0
SINUS PRESSURE: 0
SORE THROAT: 0
DIARRHEA: 0
ABDOMINAL PAIN: 0
COUGH: 0

## 2020-04-21 NOTE — PROGRESS NOTES
continuity of care for an established patient. Services were provided through a video synchronous discussion virtually to substitute for in-person clinic visit.

## 2020-07-28 ENCOUNTER — HOSPITAL ENCOUNTER (OUTPATIENT)
Dept: ULTRASOUND IMAGING | Age: 75
Discharge: HOME | End: 2020-07-30
Payer: MEDICAID

## 2020-07-28 PROCEDURE — 76775 US EXAM ABDO BACK WALL LIM: CPT

## 2020-12-11 ENCOUNTER — OFFICE VISIT (OUTPATIENT)
Dept: ENDOCRINOLOGY | Age: 75
End: 2020-12-11
Payer: MEDICAID

## 2020-12-11 VITALS
HEIGHT: 65 IN | DIASTOLIC BLOOD PRESSURE: 88 MMHG | OXYGEN SATURATION: 98 % | WEIGHT: 213 LBS | BODY MASS INDEX: 35.49 KG/M2 | HEART RATE: 68 BPM | SYSTOLIC BLOOD PRESSURE: 155 MMHG

## 2020-12-11 LAB
CHP ED QC CHECK: NORMAL
GLUCOSE BLD-MCNC: 174 MG/DL
HBA1C MFR BLD: 12.4 %

## 2020-12-11 PROCEDURE — 83036 HEMOGLOBIN GLYCOSYLATED A1C: CPT | Performed by: INTERNAL MEDICINE

## 2020-12-11 PROCEDURE — 82962 GLUCOSE BLOOD TEST: CPT | Performed by: INTERNAL MEDICINE

## 2020-12-11 PROCEDURE — 4040F PNEUMOC VAC/ADMIN/RCVD: CPT | Performed by: INTERNAL MEDICINE

## 2020-12-11 PROCEDURE — G8484 FLU IMMUNIZE NO ADMIN: HCPCS | Performed by: INTERNAL MEDICINE

## 2020-12-11 PROCEDURE — G8417 CALC BMI ABV UP PARAM F/U: HCPCS | Performed by: INTERNAL MEDICINE

## 2020-12-11 PROCEDURE — 1036F TOBACCO NON-USER: CPT | Performed by: INTERNAL MEDICINE

## 2020-12-11 PROCEDURE — 1123F ACP DISCUSS/DSCN MKR DOCD: CPT | Performed by: INTERNAL MEDICINE

## 2020-12-11 PROCEDURE — G8427 DOCREV CUR MEDS BY ELIG CLIN: HCPCS | Performed by: INTERNAL MEDICINE

## 2020-12-11 PROCEDURE — 99213 OFFICE O/P EST LOW 20 MIN: CPT | Performed by: INTERNAL MEDICINE

## 2020-12-11 RX ORDER — CALCITRIOL 0.25 UG/1
0.25 CAPSULE, LIQUID FILLED ORAL DAILY
COMMUNITY
Start: 2020-10-15

## 2020-12-11 RX ORDER — PANTOPRAZOLE SODIUM 40 MG/1
40 TABLET, DELAYED RELEASE ORAL DAILY
COMMUNITY
Start: 2020-09-10

## 2020-12-11 RX ORDER — CARVEDILOL 6.25 MG/1
6.5 TABLET ORAL DAILY
COMMUNITY
Start: 2020-10-15

## 2020-12-11 RX ORDER — INSULIN LISPRO 100 [IU]/ML
INJECTION, SUSPENSION SUBCUTANEOUS
Qty: 15 PEN | Refills: 3 | Status: SHIPPED | OUTPATIENT
Start: 2020-12-11 | End: 2021-11-12

## 2020-12-11 RX ORDER — SIMVASTATIN 20 MG
20 TABLET ORAL DAILY
COMMUNITY
Start: 2020-12-10 | End: 2021-09-25 | Stop reason: ALTCHOICE

## 2020-12-11 NOTE — PROGRESS NOTES
Subjective:      Patient ID: Monica Anderson is a 66 y.o. male. 2-year follow-up on type 2 diabetes A1c has been higher complicated diabetes include nephropathy sees nephrologist A1c is a 12 patient not very compliant with diet  Diabetes  He presents for his follow-up diabetic visit. He has type 2 diabetes mellitus. Symptoms are stable. Diabetic complications include nephropathy. Current diabetic treatment includes insulin injections (35/25 Humalog). He is currently taking insulin pre-breakfast and pre-dinner. His overall blood glucose range is >200 mg/dl. (Lab Results       Component                Value               Date                       LABA1C                   12.4                12/11/2020            )        Results for Basil Ohs (MRN [id number]) as of 12/11/2020 15:56   Ref. Range 6/21/2019 09:22 9/18/2019 06:41 1/13/2020 06:46 4/16/2020 07:58 12/11/2020 15:50   Hemoglobin A1C Latest Units: % 7.9 8.6 (H) 8.3 (H) 8.8 (H) 12.4       Results for Basil Ohs (MRN [id number]) as of 12/11/2020 15:56   Ref.  Range 10/12/2020 07:02 10/12/2020 07:02 12/11/2020 15:42 12/11/2020 15:50   Sodium Latest Ref Range: 135 - 144 mEq/L 134 (L)      Potassium Latest Ref Range: 3.4 - 4.9 mEq/L 4.8      Chloride Latest Ref Range: 95 - 107 mEq/L 97      CO2 Latest Ref Range: 20 - 31 mEq/L 26      BUN Latest Ref Range: 8 - 23 mg/dL 26 (H)      Creatinine Latest Ref Range: 0.70 - 1.20 mg/dL 1.62 (H)      Anion Gap Latest Ref Range: 9 - 15 mEq/L 11      GFR Non- Latest Ref Range: >60  41.5 (L)      GFR  Latest Ref Range: >60  50.2 (L)      Protein/Creat Ratio Latest Ref Range: 0.0 - 0.2 mL/mL 0.4 0.4 (H)     Glucose Latest Units: mg/dL 239 (H)  174    Calcium Latest Ref Range: 8.5 - 9.9 mg/dL 9.1      Phosphorus Latest Ref Range: 2.3 - 4.8 mg/dL 3.0      Albumin Latest Ref Range: 3.5 - 4.6 g/dL 4.4      Hemoglobin A1C Latest Units: %    12.4     Patient Active Problem List   Diagnosis  [facility]    Hypercholesteremia    Diabetic neuropathy (HCC)    Arthritis    HTN (hypertension)    Elevated PSA    Prostate CA (HCC)    Stage 3 chronic kidney disease     Allergies   Allergen Reactions    Acetaminophen     Glipizide     Propoxyphene And Methadone [Methadone]        Current Outpatient Medications:     simvastatin (ZOCOR) 20 MG tablet, Take 20 mg by mouth daily, Disp: , Rfl:     calcitRIOL (ROCALTROL) 0.25 MCG capsule, Take 0.25 mcg by mouth daily, Disp: , Rfl:     insulin lispro protamine & lispro (HUMALOG MIX 75/25 KWIKPEN) (75-25) 100 UNIT per ML SUPN injection pen, Inject 25 units am and 35 with dinner please give 3 boxes for a 90 day, Disp: 15 pen, Rfl: 3    blood glucose test strips (ONE TOUCH ULTRA TEST) strip, USE TO TEST GLUCOSE FOUR TIMES DAILY, Disp: 300 strip, Rfl: 3    Insulin Pen Needle (NOVOFINE) 32G X 6 MM MISC, USE TWICE DAILY, DX: E11.65, IDDM, Disp: 200 each, Rfl: 3    amLODIPine (NORVASC) 5 MG tablet, TAKE 1 TABLET BY MOUTH DAILY, Disp: 90 tablet, Rfl: 1    hydrochlorothiazide (HYDRODIURIL) 25 MG tablet, TAKE 1 TABLET BY MOUTH DAILY, Disp: 90 tablet, Rfl: 1    ranitidine (ZANTAC) 150 MG tablet, Take 150 mg by mouth 2 times daily. , Disp: , Rfl:     pantoprazole (PROTONIX) 40 MG tablet, Take 40 mg by mouth daily, Disp: , Rfl:     carvedilol (COREG) 6.25 MG tablet, Take 6.5 mg by mouth daily, Disp: , Rfl:       Review of Systems    Vitals:    12/11/20 1546   BP: (!) 155/88   Pulse: 68   SpO2: 98%   Weight: 213 lb (96.6 kg)   Height: 5' 5\" (1.651 m)       Objective:   Physical Exam  Constitutional:       Appearance: Normal appearance. He is normal weight. HENT:      Head: Normocephalic and atraumatic. Right Ear: External ear normal.   Neck:      Musculoskeletal: Normal range of motion. Cardiovascular:      Rate and Rhythm: Normal rate.    Pulmonary:      Effort: Pulmonary effort is normal.   Musculoskeletal: Right lower leg: Edema present. Left lower leg: Edema present. Neurological:      General: No focal deficit present. Mental Status: He is alert. Assessment:       Diagnosis Orders   1.  Type 2 diabetes mellitus with other specified complication, with long-term current use of insulin ([facility])  POCT Glucose    POCT glycosylated hemoglobin (Hb A1C)           Plan:      Orders Placed This Encounter   Procedures    Basic Metabolic Panel     Standing Status:   Future     Standing Expiration Date:   12/11/2021    Hemoglobin A1C     Standing Status:   Future     Standing Expiration Date:   12/11/2021    POCT Glucose    POCT glycosylated hemoglobin (Hb A1C)     Orders Placed This Encounter   Medications    insulin lispro protamine & lispro (HUMALOG MIX 75/25 KWIKPEN) (75-25) 100 UNIT per ML SUPN injection pen     Sig: Inject 25 units am and 35 with dinner please give 3 boxes for a 90 day     Dispense:  15 pen     Refill:  3     Increase insulin dose A1c goal of less than 8        Dominique Blanco MD

## 2021-03-13 ENCOUNTER — OFFICE VISIT (OUTPATIENT)
Dept: ENDOCRINOLOGY | Age: 76
End: 2021-03-13
Payer: COMMERCIAL

## 2021-03-13 VITALS
DIASTOLIC BLOOD PRESSURE: 86 MMHG | SYSTOLIC BLOOD PRESSURE: 134 MMHG | BODY MASS INDEX: 35.68 KG/M2 | HEIGHT: 66 IN | HEART RATE: 64 BPM | WEIGHT: 222 LBS | OXYGEN SATURATION: 96 %

## 2021-03-13 DIAGNOSIS — Z79.4 TYPE 2 DIABETES MELLITUS WITH OTHER SPECIFIED COMPLICATION, WITH LONG-TERM CURRENT USE OF INSULIN (HCC): Primary | ICD-10-CM

## 2021-03-13 DIAGNOSIS — E11.69 TYPE 2 DIABETES MELLITUS WITH OTHER SPECIFIED COMPLICATION, WITH LONG-TERM CURRENT USE OF INSULIN (HCC): Primary | ICD-10-CM

## 2021-03-13 DIAGNOSIS — N18.30 STAGE 3 CHRONIC KIDNEY DISEASE, UNSPECIFIED WHETHER STAGE 3A OR 3B CKD (HCC): ICD-10-CM

## 2021-03-13 PROCEDURE — 1123F ACP DISCUSS/DSCN MKR DOCD: CPT | Performed by: INTERNAL MEDICINE

## 2021-03-13 PROCEDURE — G8417 CALC BMI ABV UP PARAM F/U: HCPCS | Performed by: INTERNAL MEDICINE

## 2021-03-13 PROCEDURE — 4040F PNEUMOC VAC/ADMIN/RCVD: CPT | Performed by: INTERNAL MEDICINE

## 2021-03-13 PROCEDURE — 1036F TOBACCO NON-USER: CPT | Performed by: INTERNAL MEDICINE

## 2021-03-13 PROCEDURE — 99213 OFFICE O/P EST LOW 20 MIN: CPT | Performed by: INTERNAL MEDICINE

## 2021-03-13 PROCEDURE — G8427 DOCREV CUR MEDS BY ELIG CLIN: HCPCS | Performed by: INTERNAL MEDICINE

## 2021-03-13 PROCEDURE — G8484 FLU IMMUNIZE NO ADMIN: HCPCS | Performed by: INTERNAL MEDICINE

## 2021-03-13 NOTE — PROGRESS NOTES
Subjective:      Patient ID: Pino Cook is a 66 y.o. male. Follow-up on type 2 diabetes A1c has improved from 12-8.6 done recently through primary care patient also seeing nephrologist and primary care to have labs done in the next 3 months planning also to have Covid vaccine testing blood sugar 2 times a day between 100 -200hypoglycemia  Diabetes  He presents for his follow-up diabetic visit. He has type 2 diabetes mellitus. Symptoms are improving. Diabetic complications include nephropathy. Current diabetic treatment includes insulin injections (75/25 Humalog). He is currently taking insulin pre-breakfast and pre-dinner. His overall blood glucose range is 180-200 mg/dl. (Reviewed blood sugar logs average blood sugars in the upper 100 range)     Last hbaic was 8.6 in 3/2021    Results for Rowan Pacheco (MRN [id number]) as of 3/13/2021 12:08   Ref.  Range 10/12/2020 07:02 10/12/2020 07:02 12/11/2020 15:42 12/11/2020 15:50   Sodium Latest Ref Range: 135 - 144 mEq/L 134 (L)      Potassium Latest Ref Range: 3.4 - 4.9 mEq/L 4.8      Chloride Latest Ref Range: 95 - 107 mEq/L 97      CO2 Latest Ref Range: 20 - 31 mEq/L 26      BUN Latest Ref Range: 8 - 23 mg/dL 26 (H)      Creatinine Latest Ref Range: 0.70 - 1.20 mg/dL 1.62 (H)      Anion Gap Latest Ref Range: 9 - 15 mEq/L 11      GFR Non- Latest Ref Range: >60  41.5 (L)      GFR  Latest Ref Range: >60  50.2 (L)      Protein/Creat Ratio Latest Ref Range: 0.0 - 0.2 mL/mL 0.4 0.4 (H)     Glucose Latest Units: mg/dL 239 (H)  174    Calcium Latest Ref Range: 8.5 - 9.9 mg/dL 9.1      Phosphorus Latest Ref Range: 2.3 - 4.8 mg/dL 3.0      Albumin Latest Ref Range: 3.5 - 4.6 g/dL 4.4      Hemoglobin A1C Latest Units: %    12.4   PTH Latest Ref Range: 15.0 - 65.0 pg/mL 135.7 (H)        Patient Active Problem List   Diagnosis    DM (diabetes mellitus) (HCC)    Hypercholesteremia    Diabetic neuropathy (HCC)    Arthritis    HTN (hypertension)  Elevated PSA    Prostate CA (HCC)    Stage 3 chronic kidney disease     Allergies   Allergen Reactions    Acetaminophen     Glipizide     Propoxyphene And Methadone [Methadone]        Current Outpatient Medications:     simvastatin (ZOCOR) 20 MG tablet, Take 20 mg by mouth daily, Disp: , Rfl:     calcitRIOL (ROCALTROL) 0.25 MCG capsule, Take 0.25 mcg by mouth daily, Disp: , Rfl:     pantoprazole (PROTONIX) 40 MG tablet, Take 40 mg by mouth daily, Disp: , Rfl:     carvedilol (COREG) 6.25 MG tablet, Take 6.5 mg by mouth daily, Disp: , Rfl:     insulin lispro protamine & lispro (HUMALOG MIX 75/25 KWIKPEN) (75-25) 100 UNIT per ML SUPN injection pen, Inject 25 units am and 35 with dinner please give 3 boxes for a 90 day, Disp: 15 pen, Rfl: 3    blood glucose test strips (ONE TOUCH ULTRA TEST) strip, USE TO TEST GLUCOSE FOUR TIMES DAILY, Disp: 300 strip, Rfl: 3    Insulin Pen Needle (NOVOFINE) 32G X 6 MM MISC, USE TWICE DAILY, DX: E11.65, IDDM, Disp: 200 each, Rfl: 3    amLODIPine (NORVASC) 5 MG tablet, TAKE 1 TABLET BY MOUTH DAILY, Disp: 90 tablet, Rfl: 1    hydrochlorothiazide (HYDRODIURIL) 25 MG tablet, TAKE 1 TABLET BY MOUTH DAILY, Disp: 90 tablet, Rfl: 1    ranitidine (ZANTAC) 150 MG tablet, Take 150 mg by mouth 2 times daily. , Disp: , Rfl:       Review of Systems   Cardiovascular: Negative. Endocrine: Negative. All other systems reviewed and are negative. Vitals:    03/13/21 1205   BP: 134/86   Pulse: 64   SpO2: 96%   Weight: 222 lb (100.7 kg)   Height: 5' 6\" (1.676 m)       Objective:   Physical Exam  Vitals signs reviewed. Constitutional:       Appearance: Normal appearance. He is obese. HENT:      Head: Normocephalic and atraumatic. Right Ear: External ear normal.      Left Ear: External ear normal.      Nose: Nose normal.   Neck:      Musculoskeletal: Normal range of motion and neck supple. Cardiovascular:      Rate and Rhythm: Normal rate.    Musculoskeletal: Normal range of motion. Neurological:      General: No focal deficit present. Mental Status: He is alert and oriented to person, place, and time. Psychiatric:         Mood and Affect: Mood normal.         Assessment:       Diagnosis Orders   1.  Type 2 diabetes mellitus with other specified complication, with long-term current use of insulin ([facility])     2. Stage 3 chronic kidney disease, unspecified whether stage 3a or 3b CKD             Plan:      Continue 75/25 Humalog 25 units in the morning 35 units at dinnertime A1c goal of 8 or lower follow-up in 3 months        Forrest Roberto MD

## 2021-04-08 DIAGNOSIS — E11.69 TYPE 2 DIABETES MELLITUS WITH OTHER SPECIFIED COMPLICATION, WITH LONG-TERM CURRENT USE OF INSULIN (HCC): Chronic | ICD-10-CM

## 2021-04-08 DIAGNOSIS — Z79.4 TYPE 2 DIABETES MELLITUS WITH OTHER SPECIFIED COMPLICATION, WITH LONG-TERM CURRENT USE OF INSULIN (HCC): Chronic | ICD-10-CM

## 2021-06-26 ENCOUNTER — OFFICE VISIT (OUTPATIENT)
Dept: ENDOCRINOLOGY | Age: 76
End: 2021-06-26
Payer: COMMERCIAL

## 2021-06-26 VITALS
WEIGHT: 224 LBS | BODY MASS INDEX: 36.15 KG/M2 | DIASTOLIC BLOOD PRESSURE: 72 MMHG | HEART RATE: 67 BPM | SYSTOLIC BLOOD PRESSURE: 138 MMHG | OXYGEN SATURATION: 98 %

## 2021-06-26 DIAGNOSIS — Z79.4 TYPE 2 DIABETES MELLITUS WITH OTHER SPECIFIED COMPLICATION, WITH LONG-TERM CURRENT USE OF INSULIN (HCC): Primary | ICD-10-CM

## 2021-06-26 DIAGNOSIS — E11.69 TYPE 2 DIABETES MELLITUS WITH OTHER SPECIFIED COMPLICATION, WITH LONG-TERM CURRENT USE OF INSULIN (HCC): Primary | ICD-10-CM

## 2021-06-26 LAB
CHP ED QC CHECK: NORMAL
GLUCOSE BLD-MCNC: 93 MG/DL
HBA1C MFR BLD: 9.8 %

## 2021-06-26 PROCEDURE — G8427 DOCREV CUR MEDS BY ELIG CLIN: HCPCS | Performed by: INTERNAL MEDICINE

## 2021-06-26 PROCEDURE — 82962 GLUCOSE BLOOD TEST: CPT | Performed by: INTERNAL MEDICINE

## 2021-06-26 PROCEDURE — G8417 CALC BMI ABV UP PARAM F/U: HCPCS | Performed by: INTERNAL MEDICINE

## 2021-06-26 PROCEDURE — 83036 HEMOGLOBIN GLYCOSYLATED A1C: CPT | Performed by: INTERNAL MEDICINE

## 2021-06-26 PROCEDURE — 99213 OFFICE O/P EST LOW 20 MIN: CPT | Performed by: INTERNAL MEDICINE

## 2021-06-26 PROCEDURE — 1036F TOBACCO NON-USER: CPT | Performed by: INTERNAL MEDICINE

## 2021-06-26 PROCEDURE — 1123F ACP DISCUSS/DSCN MKR DOCD: CPT | Performed by: INTERNAL MEDICINE

## 2021-06-26 PROCEDURE — 4040F PNEUMOC VAC/ADMIN/RCVD: CPT | Performed by: INTERNAL MEDICINE

## 2021-06-26 RX ORDER — GABAPENTIN 300 MG/1
300 CAPSULE ORAL 2 TIMES DAILY
Qty: 180 CAPSULE | Refills: 1 | Status: SHIPPED | OUTPATIENT
Start: 2021-06-26 | End: 2022-01-26

## 2021-06-26 RX ORDER — LISINOPRIL 40 MG/1
40 TABLET ORAL DAILY
COMMUNITY

## 2021-06-26 ASSESSMENT — ENCOUNTER SYMPTOMS: BACK PAIN: 1

## 2021-06-26 NOTE — PROGRESS NOTES
6/26/2021    Assessment:       Diagnosis Orders   1. Type 2 diabetes mellitus with other specified complication, with long-term current use of insulin (HCC)  POCT Glucose    POCT glycosylated hemoglobin (Hb A1C)    Lipid, Fasting    Hemoglobin F4M    Basic Metabolic Panel, Fasting         PLAN:     Orders Placed This Encounter   Procedures    Lipid, Fasting     Standing Status:   Future     Standing Expiration Date:   6/26/2022    Hemoglobin A1C     Standing Status:   Future     Standing Expiration Date:   6/26/2022    Basic Metabolic Panel, Fasting     Standing Status:   Future     Standing Expiration Date:   6/26/2022    POCT Glucose    POCT glycosylated hemoglobin (Hb A1C)     Add gabapentin 300 mg twice a day continue current dose of insulin 75/25 Humalog 25 units in the morning 35 units in the evening A1c goal of 8 or lower  Orders Placed This Encounter   Medications    gabapentin (NEURONTIN) 300 MG capsule     Sig: Take 1 capsule by mouth 2 times daily for 180 days. Intended supply: 90 days     Dispense:  180 capsule     Refill:  1           Orders Placed This Encounter   Procedures    POCT Glucose    POCT glycosylated hemoglobin (Hb A1C)     No orders of the defined types were placed in this encounter. No follow-ups on file.   Subjective:     Chief Complaint   Patient presents with    Diabetes     Vitals:    06/26/21 0916 06/26/21 0925   BP: (!) 142/70 138/72   Pulse: 67    SpO2: 98%    Weight: 224 lb (101.6 kg)      Wt Readings from Last 3 Encounters:   06/26/21 224 lb (101.6 kg)   03/13/21 222 lb (100.7 kg)   12/11/20 213 lb (96.6 kg)     BP Readings from Last 3 Encounters:   06/26/21 138/72   03/13/21 134/86   12/11/20 (!) 155/88     F/u on type 2 dm, c/o radicular pain lower extremity has had back injury many years ago has seen in neurology neurosurgeon this at the is not much they could do does complain of sciatic diabetic on some medications for it blood sugars overall have been improving it was lower today  Hemoglobin A1c down to 9.8 from 12 denies any hypoglycemia    Diabetes  He presents for his follow-up diabetic visit. He has type 2 diabetes mellitus. Symptoms are improving. Diabetic complications include nephropathy and peripheral neuropathy. Risk factors for coronary artery disease include obesity. Current diabetic treatment includes insulin injections. He is currently taking insulin pre-breakfast and pre-dinner. His overall blood glucose range is >200 mg/dl.  (Lab Results       Component                Value               Date                       LABA1C                   9.8                 2021              )     Past Medical History:   Diagnosis Date    Arthritis     [facility] neuropathy [facility]     Elevated PSA     Hyperlipidemia     Hypertension     Osteoarthritis     Prostate cancer ([facility] 2017    Type II diabetes mellitus, uncontrolled ([facility]      Past Surgical History:   Procedure Laterality Date    CHOLECYSTECTOMY      CIRCUMCISION, NON-      OTHER SURGICAL HISTORY      gallstones removed    TONSILLECTOMY      ULTRASOUND PROSTATE/TRANSRECTAL N/A 2017    PROSTATE TRANSRECTAL ULTRASOUND BIOPSY performed by Oll[facility] Forte [facility] at [facility] History     Socioeconomic History    Marital status:      Spouse name: Not on file    Number of children: Not on file    Years of education: Not on file    Highest education level: Not on file   Occupational History    Not on file   Tobacco Use    Smoking status: Former Smoker     Packs/day: 0.25     Types: Cigarettes     Quit date: 1/3/2017     Years since quittin.4    Smokeless tobacco: Never Used    Tobacco comment: couple cigarettes a day   Substance and Sexual Activity    Alcohol use: No     Comment: rarely- 1 beer monthly    Drug use: No    Sexual activity: Never   Other Topics Concern    Not on file   Social History Narrative    Not on file     Social Determinants of Health Financial Resource Strain:     Difficulty of Paying Living Expenses:    Food Insecurity:     Worried About Running Out of Food in the Last Year:     [address] in the Last Year:    Transportation Needs:     Lack of Transportation (Medical):      Lack of Transportation (Non-Medical):    Physical Activity:     Days of Exercise per Week:     Minutes of Exercise per Session:    Stress:     Feeling of Stress :    Social Connections:     Frequency of Communication with Friends and Family:     Frequency of Social Gatherings with Friends and Family:     Attends Baptism Services:     Active Member of Clubs or Organizations:     Attends Club or Organization Meetings:     Marital Status:    Intimate Partner Violence:     Fear of Current or Ex-Partner:     Emotionally Abused:     Physically Abused:     Sexually Abused:      Family History   Problem Relation Age of Onset    Diabetes Mother      Allergies   Allergen Reactions    Acetaminophen     Glipizide     Propoxyphene And Methadone [Methadone]        Current Outpatient Medications:     lisinopril (PRINIVIL;ZESTRIL) 40 MG tablet, Take 40 mg by mouth daily, Disp: , Rfl:     blood glucose test strips (ONE TOUCH ULTRA TEST) strip, USE TO TEST GLUCOSE FOUR TIMES DAILY, Disp: 300 strip, Rfl: 3    simvastatin (ZOCOR) 20 MG tablet, Take 20 mg by mouth daily, Disp: , Rfl:     calcitRIOL (ROCALTROL) 0.25 MCG capsule, Take 0.25 mcg by mouth daily, Disp: , Rfl:     pantoprazole (PROTONIX) 40 MG tablet, Take 40 mg by mouth daily, Disp: , Rfl:     carvedilol (COREG) 6.25 MG tablet, Take 6.5 mg by mouth daily, Disp: , Rfl:     insulin lispro protamine & lispro (HUMALOG MIX 75/25 KWIKPEN) (75-25) 100 UNIT per ML SUPN injection pen, Inject 25 units am and 35 with dinner please give 3 boxes for a 90 day, Disp: 15 pen, Rfl: 3    Insulin Pen Needle (NOVOFINE) 32G X 6 MM MISC, USE TWICE DAILY, DX: E11.65, IDDM, Disp: 200 each, Rfl: 3    amLODIPine (NORVASC) 5 MG tablet, TAKE 1 TABLET BY MOUTH DAILY, Disp: 90 tablet, Rfl: 1    hydrochlorothiazide (HYDRODIURIL) 25 MG tablet, TAKE 1 TABLET BY MOUTH DAILY, Disp: 90 tablet, Rfl: 1    ranitidine (ZANTAC) 150 MG tablet, Take 150 mg by mouth 2 times daily. , Disp: , Rfl:   Lab Results   Component Value Date     04/15/2021    K 5.1 (H) 04/15/2021     04/15/2021    CO2 25 04/15/2021    BUN 29 (H) 04/15/2021    CREATININE 1.62 (H) 04/15/2021    GLUCOSE 93 06/26/2021    CALCIUM 9.4 04/15/2021    PROT 7.7 04/16/2020    LABALBU 4.3 04/15/2021    BILITOT 0.3 04/16/2020    ALKPHOS 115 (H) 04/16/2020    AST 9 04/16/2020    ALT 10 04/16/2020    LABGLOM 41.5 (L) 04/15/2021    GFRAA 50.2 (L) 04/15/2021    GLOB 3.3 04/16/2020     Lab Results   Component Value Date    WBC 6.1 04/15/2021    HGB 11.6 (L) 04/15/2021    HCT 35.0 (L) 04/15/2021    MCV 90.4 04/15/2021     04/15/2021     Lab Results   Component Value Date    LABA1C 9.8 06/26/2021    LABA1C 12.4 12/11/2020    LABA1C 8.8 (H) 04/16/2020     Lab Results   Component Value Date    HDL 47 01/13/2020    HDL 50 12/03/2018    HDL 46 02/18/2015    LDLCALC 108 01/13/2020    LDLCALC 180 (H) 12/03/2018    LDLCALC 83 02/18/2015    CHOL 206 (H) 01/13/2020    CHOL 281 (H) 12/03/2018    CHOL 158 02/18/2015    TRIG 253 (H) 01/13/2020    TRIG 253 (H) 12/03/2018    TRIG 145 02/18/2015       Lab Results   Component Value Date    TSH 2.190 02/18/2015    TSH 1.465 10/10/2011     Review of Systems   Musculoskeletal: Positive for back pain. All other systems reviewed and are negative. Objective:   Physical Exam  Vitals reviewed. Constitutional:       Appearance: Normal appearance. He is obese. HENT:      Head: Normocephalic and atraumatic. Hair is normal.      Right Ear: External ear normal.      Left Ear: External ear normal.      Nose: Nose normal.   Eyes:      General: No scleral icterus. Right eye: No discharge. Left eye: No discharge. Extraocular Movements: Extraocular movements intact. Conjunctiva/sclera: Conjunctivae normal.   Neck:      Trachea: Trachea normal.   Cardiovascular:      Rate and Rhythm: Normal rate. Pulmonary:      Effort: Pulmonary effort is normal.   Musculoskeletal:         General: Normal range of motion. Cervical back: Normal range of motion and neck supple. Right lower leg: Edema present. Left lower leg: Edema present. Feet:    Neurological:      General: No focal deficit present. Mental Status: He is alert and oriented to person, place, and time.    Psychiatric:         Mood and Affect: Mood normal.         Behavior: Behavior normal.

## 2021-07-27 ENCOUNTER — HOSPITAL ENCOUNTER (OUTPATIENT)
Dept: GENERAL RADIOLOGY | Age: 76
Discharge: HOME | End: 2021-07-29
Payer: COMMERCIAL

## 2021-07-27 DIAGNOSIS — M79.672 LEFT FOOT PAIN: ICD-10-CM

## 2021-07-27 PROCEDURE — 73650 X-RAY EXAM OF HEEL: CPT

## 2021-09-13 RX ORDER — PEN NEEDLE, DIABETIC 31 GX5/16"
NEEDLE, DISPOSABLE MISCELLANEOUS
Qty: 100 EACH | Refills: 5 | Status: SHIPPED | OUTPATIENT
Start: 2021-09-13

## 2021-09-25 ENCOUNTER — OFFICE VISIT (OUTPATIENT)
Dept: ENDOCRINOLOGY | Age: 76
End: 2021-09-25
Payer: COMMERCIAL

## 2021-09-25 VITALS
WEIGHT: 222 LBS | BODY MASS INDEX: 35.68 KG/M2 | OXYGEN SATURATION: 98 % | DIASTOLIC BLOOD PRESSURE: 86 MMHG | HEIGHT: 66 IN | HEART RATE: 78 BPM | SYSTOLIC BLOOD PRESSURE: 138 MMHG

## 2021-09-25 DIAGNOSIS — E11.69 TYPE 2 DIABETES MELLITUS WITH OTHER SPECIFIED COMPLICATION, WITH LONG-TERM CURRENT USE OF INSULIN (HCC): Primary | ICD-10-CM

## 2021-09-25 DIAGNOSIS — Z79.4 TYPE 2 DIABETES MELLITUS WITH OTHER SPECIFIED COMPLICATION, WITH LONG-TERM CURRENT USE OF INSULIN (HCC): Primary | ICD-10-CM

## 2021-09-25 LAB
CHP ED QC CHECK: NORMAL
GLUCOSE BLD-MCNC: 205 MG/DL

## 2021-09-25 PROCEDURE — 4040F PNEUMOC VAC/ADMIN/RCVD: CPT | Performed by: INTERNAL MEDICINE

## 2021-09-25 PROCEDURE — G8417 CALC BMI ABV UP PARAM F/U: HCPCS | Performed by: INTERNAL MEDICINE

## 2021-09-25 PROCEDURE — G8427 DOCREV CUR MEDS BY ELIG CLIN: HCPCS | Performed by: INTERNAL MEDICINE

## 2021-09-25 PROCEDURE — 1036F TOBACCO NON-USER: CPT | Performed by: INTERNAL MEDICINE

## 2021-09-25 PROCEDURE — 1123F ACP DISCUSS/DSCN MKR DOCD: CPT | Performed by: INTERNAL MEDICINE

## 2021-09-25 PROCEDURE — 82962 GLUCOSE BLOOD TEST: CPT | Performed by: INTERNAL MEDICINE

## 2021-09-25 PROCEDURE — 99213 OFFICE O/P EST LOW 20 MIN: CPT | Performed by: INTERNAL MEDICINE

## 2021-09-25 RX ORDER — SIMVASTATIN 40 MG
TABLET ORAL
COMMUNITY
Start: 2021-09-09

## 2021-09-25 ASSESSMENT — ENCOUNTER SYMPTOMS
EYES NEGATIVE: 1
RESPIRATORY NEGATIVE: 1

## 2021-09-25 NOTE — PROGRESS NOTES
9/25/2021    Assessment:       Diagnosis Orders   1. Type 2 diabetes mellitus with other specified complication, with long-term current use of insulin ([facility])  POCT Glucose    Basic Metabolic Panel    Hemoglobin A1C         PLAN:     Continue current dose of insulin 75/25 25 in the morning 35 in the evening follow-up in 3 months time my A1c goal of 8 or lower    Orders Placed This Encounter   Procedures    Basic Metabolic Panel     Standing Status:   Future     Standing Expiration Date:   9/25/2022    Hemoglobin A1C     Standing Status:   Future     Standing Expiration Date:   9/25/2022    POCT Glucose         Orders Placed This Encounter   Procedures    POCT Glucose       Subjective:     Chief Complaint   Patient presents with    Diabetes     Vitals:    09/25/21 1027   BP: 138/86   Pulse: 78   SpO2: 98%   Weight: 222 lb (100.7 kg)   Height: 5' 6\" (1.676 m)     Wt Readings from Last 3 Encounters:   09/25/21 222 lb (100.7 kg)   06/26/21 224 lb (101.6 kg)   03/13/21 222 lb (100.7 kg)     BP Readings from Last 3 Encounters:   09/25/21 138/86   06/26/21 138/72   03/13/21 134/86     Follow-up on type 2 diabetes complications include chronic kidney disease last A1c was 9.8 no recent labs to review  Patient on 75/25 Humalog 25 units in the morning 35 units in the evening  Testing bid  Seen nephrologist blood sugars in the 1-200 range denies any hypoglycemia currently on 75/25 Humalog 25 units in the morning 35 units in the evening    Diabetes  He presents for his follow-up diabetic visit. He has type 2 diabetes mellitus. Symptoms are stable. Diabetic complications include nephropathy and peripheral neuropathy. Risk factors for coronary artery disease include obesity. He is currently taking insulin pre-breakfast and pre-dinner. His overall blood glucose range is 180-200 mg/dl. An ACE inhibitor/angiotensin II receptor blocker is being taken.      Past Medical History:   Diagnosis Date    Arthritis     Diabetic neuropathy ([facility]     Elevated PSA     Hyperlipidemia     Hypertension     Osteoarthritis     Prostate cancer ([facility] 2017    Type II diabetes mellitus, uncontrolled ([facility]      Past Surgical History:   Procedure Laterality Date    CHOLECYSTECTOMY      CIRCUMCISION, NON-      OTHER SURGICAL HISTORY      gallstones removed    TONSILLECTOMY      ULTRASOUND PROSTATE/TRANSRECTAL N/A 2017    PROSTATE TRANSRECTAL UL[facility]AS[facility] History     Socioeconomic History    Marital status:      Spouse name: Not on file    Number of children: Not on file    Years of education: Not on file    Highest education level: Not on file   Occupational History    Not on file   Tobacco Use    Smoking status: Former Smoker     Packs/day: 0.25     Types: Cigarettes     Quit date: 1/3/2017     Years since quittin.7    Smokeless tobacco: Never Used    Tobacco comment: couple cigarettes a day   Substance and Sexual Activity    Alcohol use: No     Comment: rarely- 1 beer monthly    Drug use: No    Sexual activity: Never   Other Topics Concern    Not on file   Social History Narrative    Not on file     Social Determinants of Health     Financial Resource Strain:     Difficulty of Paying Living Expenses:    Food Insecurity:     Worried About Running Out of Food in the Last Year:     [address] in the Last Year:    Transportation Needs:     Lack of Transportation (Medical):      Lack of Transportation (Non-Medical):    Physical Activity:     Days of Exercise per Week:     Minutes of Exercise per Session:    Stress:     Feeling of Stress :    Social Connections:     Frequency of Communication with Friends and Family:     Frequency of Social Gatherings with Friends and Family:     Attends Anabaptist Services:     Active Member of Clubs or Organizations:     Attends Club or Organization Meetings:     Marital Status:    Intimate Partner Violence:  Fear of Current or Ex-Partner:     Emotionally Abused:     Physically Abused:     Sexually Abused:      Family History   Problem Relation Age of Onset    Diabetes Mother      Allergies   Allergen Reactions    Acetaminophen     Glipizide     Propoxyphene And Methadone [Methadone]        Current Outpatient Medications:     Insulin Pen Needle (B-D UF III MINI PEN NEEDLES) 31G X 5 MM MISC, USE TWICE DAILY, Disp: 100 each, Rfl: 5    lisinopril (PRINIVIL;ZESTRIL) 40 MG tablet, Take 40 mg by mouth daily, Disp: , Rfl:     gabapentin (NEURONTIN) 300 MG capsule, Take 1 capsule by mouth 2 times daily for 180 days. Intended supply: 90 days, Disp: 180 capsule, Rfl: 1    blood glucose test strips (ONE TOUCH ULTRA TEST) strip, USE TO TEST GLUCOSE FOUR TIMES DAILY, Disp: 300 strip, Rfl: 3    calcitRIOL (ROCALTROL) 0.25 MCG capsule, Take 0.25 mcg by mouth daily, Disp: , Rfl:     pantoprazole (PROTONIX) 40 MG tablet, Take 40 mg by mouth daily, Disp: , Rfl:     carvedilol (COREG) 6.25 MG tablet, Take 6.5 mg by mouth daily, Disp: , Rfl:     insulin lispro protamine & lispro (HUMALOG MIX 75/25 KWIKPEN) (75-25) 100 UNIT per ML SUPN injection pen, Inject 25 units am and 35 with dinner please give 3 boxes for a 90 day, Disp: 15 pen, Rfl: 3    Insulin Pen Needle (NOVOFINE) 32G X 6 MM MISC, USE TWICE DAILY, DX: E11.65, IDDM, Disp: 200 each, Rfl: 3    amLODIPine (NORVASC) 5 MG tablet, TAKE 1 TABLET BY MOUTH DAILY, Disp: 90 tablet, Rfl: 1    hydrochlorothiazide (HYDRODIURIL) 25 MG tablet, TAKE 1 TABLET BY MOUTH DAILY, Disp: 90 tablet, Rfl: 1    ranitidine (ZANTAC) 150 MG tablet, Take 150 mg by mouth 2 times daily. , Disp: , Rfl:     simvastatin (ZOCOR) 40 MG tablet, TAKE 1 TABLET BY MOUTH EVERY DAY IN THE EVENING, Disp: , Rfl:   Lab Results   Component Value Date     09/22/2021    K 5.3 (H) 09/22/2021     09/22/2021    CO2 21 09/22/2021    BUN 39 (H) 09/22/2021    CREATININE 1.48 (H) 09/22/2021    GLUCOSE 205 09/25/2021    CALCIUM 9.3 09/22/2021    PROT 7.7 04/16/2020    LABALBU 4.3 09/22/2021    BILITOT 0.3 04/16/2020    ALKPHOS 115 (H) 04/16/2020    AST 9 04/16/2020    ALT 10 04/16/2020    LABGLOM 46.0 (L) 09/22/2021    GFRAA 55.6 (L) 09/22/2021    GLOB 3.3 04/16/2020     Lab Results   Component Value Date    WBC 5.5 09/22/2021    HGB 11.8 (L) 09/22/2021    HCT 35.0 (L) 09/22/2021    MCV 91.2 09/22/2021     09/22/2021     Lab Results   Component Value Date    LABA1C 9.8 06/26/2021    LABA1C 12.4 12/11/2020    LABA1C 8.8 (H) 04/16/2020     Lab Results   Component Value Date    HDL 47 01/13/2020    HDL 50 12/03/2018    HDL 46 02/18/2015    LDLCALC 108 01/13/2020    LDLCALC 180 (H) 12/03/2018    LDLCALC 83 02/18/2015    CHOL 206 (H) 01/13/2020    CHOL 281 (H) 12/03/2018    CHOL 158 02/18/2015    TRIG 253 (H) 01/13/2020    TRIG 253 (H) 12/03/2018    TRIG 145 02/18/2015     No results found for: TESTM  Lab Results   Component Value Date    TSH 2.190 02/18/2015    TSH 1.465 10/10/2011     No results found for: TPOABS    Review of Systems   Eyes: Negative. Respiratory: Negative. Cardiovascular: Negative. Musculoskeletal: Positive for arthralgias. All other systems reviewed and are negative. Objective:   Physical Exam  Vitals reviewed. Constitutional:       Appearance: Normal appearance. He is obese. HENT:      Head: Normocephalic and atraumatic. Hair is normal.      Right Ear: External ear normal.      Left Ear: External ear normal.      Nose: Nose normal.   Eyes:      General: No scleral icterus. Right eye: No discharge. Left eye: No discharge. Extraocular Movements: Extraocular movements intact. Conjunctiva/sclera: Conjunctivae normal.   Neck:      Trachea: Trachea normal.   Cardiovascular:      Rate and Rhythm: Normal rate. Pulmonary:      Effort: Pulmonary effort is normal.   Musculoskeletal:         General: Normal range of motion.       Cervical back: Normal range of motion and neck supple. Neurological:      General: No focal deficit present. Mental Status: He is alert and oriented to person, place, and time.    Psychiatric:         Mood and Affect: Mood normal.         Behavior: Behavior normal.

## 2021-11-12 RX ORDER — INSULIN LISPRO 100 [IU]/ML
INJECTION, SUSPENSION SUBCUTANEOUS
Qty: 45 ML | Refills: 1 | Status: SHIPPED | OUTPATIENT
Start: 2021-11-12 | End: 2022-06-07 | Stop reason: SDUPTHER

## 2021-12-27 DIAGNOSIS — E11.69 TYPE 2 DIABETES MELLITUS WITH OTHER SPECIFIED COMPLICATION, WITH LONG-TERM CURRENT USE OF INSULIN (HCC): ICD-10-CM

## 2021-12-27 DIAGNOSIS — Z79.4 TYPE 2 DIABETES MELLITUS WITH OTHER SPECIFIED COMPLICATION, WITH LONG-TERM CURRENT USE OF INSULIN (HCC): ICD-10-CM

## 2021-12-27 LAB
ANION GAP SERPL CALCULATED.3IONS-SCNC: 14 MEQ/L (ref 9–15)
BUN BLDV-MCNC: 39 MG/DL (ref 8–23)
CALCIUM SERPL-MCNC: 8.9 MG/DL (ref 8.5–9.9)
CHLORIDE BLD-SCNC: 108 MEQ/L (ref 95–107)
CO2: 20 MEQ/L (ref 20–31)
CREAT SERPL-MCNC: 1.79 MG/DL (ref 0.7–1.2)
GFR AFRICAN AMERICAN: 44.6
GFR NON-AFRICAN AMERICAN: 36.9
GLUCOSE BLD-MCNC: 101 MG/DL (ref 70–99)
HBA1C MFR BLD: 7.9 % (ref 4.8–5.9)
POTASSIUM SERPL-SCNC: 5.1 MEQ/L (ref 3.4–4.9)
SODIUM BLD-SCNC: 142 MEQ/L (ref 135–144)

## 2022-01-08 ENCOUNTER — OFFICE VISIT (OUTPATIENT)
Dept: ENDOCRINOLOGY | Age: 77
End: 2022-01-08
Payer: COMMERCIAL

## 2022-01-08 VITALS
HEIGHT: 66 IN | WEIGHT: 210 LBS | HEART RATE: 70 BPM | DIASTOLIC BLOOD PRESSURE: 64 MMHG | BODY MASS INDEX: 33.75 KG/M2 | OXYGEN SATURATION: 98 % | SYSTOLIC BLOOD PRESSURE: 122 MMHG

## 2022-01-08 DIAGNOSIS — Z79.4 TYPE 2 DIABETES MELLITUS WITH OTHER SPECIFIED COMPLICATION, WITH LONG-TERM CURRENT USE OF INSULIN (HCC): Primary | ICD-10-CM

## 2022-01-08 DIAGNOSIS — E11.69 TYPE 2 DIABETES MELLITUS WITH OTHER SPECIFIED COMPLICATION, WITH LONG-TERM CURRENT USE OF INSULIN (HCC): Primary | ICD-10-CM

## 2022-01-08 LAB
CHP ED QC CHECK: NORMAL
GLUCOSE BLD-MCNC: 308 MG/DL

## 2022-01-08 PROCEDURE — 1123F ACP DISCUSS/DSCN MKR DOCD: CPT | Performed by: INTERNAL MEDICINE

## 2022-01-08 PROCEDURE — 1036F TOBACCO NON-USER: CPT | Performed by: INTERNAL MEDICINE

## 2022-01-08 PROCEDURE — G8484 FLU IMMUNIZE NO ADMIN: HCPCS | Performed by: INTERNAL MEDICINE

## 2022-01-08 PROCEDURE — 82962 GLUCOSE BLOOD TEST: CPT | Performed by: INTERNAL MEDICINE

## 2022-01-08 PROCEDURE — 4040F PNEUMOC VAC/ADMIN/RCVD: CPT | Performed by: INTERNAL MEDICINE

## 2022-01-08 PROCEDURE — G8427 DOCREV CUR MEDS BY ELIG CLIN: HCPCS | Performed by: INTERNAL MEDICINE

## 2022-01-08 PROCEDURE — G8417 CALC BMI ABV UP PARAM F/U: HCPCS | Performed by: INTERNAL MEDICINE

## 2022-01-08 PROCEDURE — 99213 OFFICE O/P EST LOW 20 MIN: CPT | Performed by: INTERNAL MEDICINE

## 2022-01-08 NOTE — PROGRESS NOTES
1/8/2022    Assessment:       Diagnosis Orders   1. Type 2 diabetes mellitus with other specified complication, with long-term current use of insulin ([facility])  POCT Glucose         PLAN:     Orders Placed This Encounter   Procedures    Basic Metabolic Panel     Standing Status:   Future     Standing Expiration Date:   1/8/2023    Hemoglobin A1C     Standing Status:   Future     Standing Expiration Date:   1/8/2023    POCT Glucose     Continue current insulin regimen follow-up in 3 to 6 months time        Orders Placed This Encounter   Procedures    POCT Glucose       Subjective:     Chief Complaint   Patient presents with    3 Month Follow-Up    Diabetes     Vitals:    01/08/22 1018   BP: 122/64   Pulse: 70   SpO2: 98%   Weight: 210 lb (95.3 kg)   Height: 5' 6\" (1.676 m)     Wt Readings from Last 3 Encounters:   01/08/22 210 lb (95.3 kg)   09/25/21 222 lb (100.7 kg)   06/26/21 224 lb (101.6 kg)     BP Readings from Last 3 Encounters:   01/08/22 122/64   09/25/21 138/86   06/26/21 138/72     Follow-up on type 2 diabetes patient on 75/25 Humalog 25 units in the morning 35 units in the evening testing 2-3 times a day denies any hypoglycemia A1c was 7.9    Diabetes  He presents for his follow-up diabetic visit. He has type 2 diabetes mellitus. His disease course has been fluctuating. Symptoms are improving. Diabetic complications include nephropathy and peripheral neuropathy. Current diabetic treatment includes insulin injections. He is currently taking insulin pre-breakfast and pre-dinner. His overall blood glucose range is 140-180 mg/dl.  (Lab Results       Component                Value               Date                       LABA1C                   7.9 (H)             12/27/2021              )     Past Medical History:   Diagnosis Date    Arthritis     Di[facility] ([facility]     Elevated PSA     Hyperlipidemia     Hypertension     Osteoarthritis     Prostate cancer ([facility] 03/2017    Type II diabetes mellitus, uncontrolled ([facility]      Past Surgical History:   Procedure Laterality Date    CHOLECYSTECTOMY      CIRCUMCISION, NON-      OTHER SURGICAL HISTORY      gallstones removed    TONSILLECTOMY      ULTRASOUND PROSTATE/TRANSRECTAL N/A 2017    PROSTATE TRANSRECTAL ULTRASOUND BIOPSY performed by Pete Cuba MD at [facility] History     Socioeconomic History    Marital status:      Spouse name: Not on file    Number of children: Not on file    Years of education: Not on file    Highest education level: Not on file   Occupational History    Not on file   Tobacco Use    Smoking status: Former Smoker     Packs/day: 0.25     Types: Cigarettes     Quit date: 1/3/2017     Years since quittin.0    Smokeless tobacco: Never Used    Tobacco comment: couple cigarettes a day   Substance and Sexual Activity    Alcohol use: No     Comment: rarely- 1 beer monthly    Drug use: No    Sexual activity: Never   Other Topics Concern    Not on file   Social History Narrative    Not on file     Social Determinants of Health     Financial Resource Strain:     Difficulty of Paying Living Expenses: Not on file   Food Insecurity:     Worried About [address] in the Last Year: Not on file    Lovely of Food in the Last Year: Not on file   Transportation Needs:     Lack of Transportation (Medical): Not on file    Lack of Transportation (Non-Medical):  Not on file   Physical Activity:     Days of Exercise per Week: Not on file    Minutes of Exercise per Session: Not on file   Stress:     Feeling of Stress : Not on file   Social Connections:     Frequency of Communication with Friends and Family: Not on file    Frequency of Social Gatherings with Friends and Family: Not on file    Attends Zoroastrian Services: Not on file    Active Member of Clubs or Organizations: Not on file    Attends Club or Organization Meetings: Not on file    Marital Status: Not on file   Intimate Partner Violence:     Fear of Current or Ex-Partner: Not on file    Emotionally Abused: Not on file    Physically Abused: Not on file    Sexually Abused: Not on file   Housing Stability:     Unable to Pay for Housing in the Last Year: Not on file    Number of Places Lived in the Last Year: Not on file    Unstable Housing in the Last Year: Not on file     Family History   Problem Relation Age of Onset    Diabetes Mother      Allergies   Allergen Reactions    Acetaminophen     Glipizide     Propoxyphene      Other reaction(s): Unknown    Propoxyphene And Methadone [Methadone]        Current Outpatient Medications:     insulin lispro protamine & lispro (HUMALOG MIX 75/25 KWIKPEN) (75-25) 100 UNIT per ML SUPN injection pen, INJECT 25 UNITS SUBCUTANEOUSLY EVERY MORNING AND INJECT 35 UNITS SUBCUTANEOUSLY EVERY EVENING WITH DINNER, Disp: 45 mL, Rfl: 1    simvastatin (ZOCOR) 40 MG tablet, TAKE 1 TABLET BY MOUTH EVERY DAY IN THE EVENING, Disp: , Rfl:     Insulin Pen Needle (B-D UF III MINI PEN NEEDLES) 31G X 5 MM MISC, USE TWICE DAILY, Disp: 100 each, Rfl: 5    lisinopril (PRINIVIL;ZESTRIL) 40 MG tablet, Take 40 mg by mouth daily, Disp: , Rfl:     gabapentin (NEURONTIN) 300 MG capsule, Take 1 capsule by mouth 2 times daily for 180 days.  Intended supply: 90 days, Disp: 180 capsule, Rfl: 1    blood glucose test strips (ONE TOUCH ULTRA TEST) strip, USE TO TEST GLUCOSE FOUR TIMES DAILY, Disp: 300 strip, Rfl: 3    calcitRIOL (ROCALTROL) 0.25 MCG capsule, Take 0.25 mcg by mouth daily, Disp: , Rfl:     pantoprazole (PROTONIX) 40 MG tablet, Take 40 mg by mouth daily, Disp: , Rfl:     carvedilol (COREG) 6.25 MG tablet, Take 6.5 mg by mouth daily, Disp: , Rfl:     Insulin Pen Needle (NOVOFINE) 32G X 6 MM MISC, USE TWICE DAILY, DX: E11.65, IDDM, Disp: 200 each, Rfl: 3    amLODIPine (NORVASC) 5 MG tablet, TAKE 1 TABLET BY MOUTH DAILY, Disp: 90 tablet, Rfl: 1    hydrochlorothiazide (HYDRODIURIL) 25 MG tablet, TAKE 1 TABLET BY MOUTH DAILY, Disp: 90 tablet, Rfl: 1    ranitidine (ZANTAC) 150 MG tablet, Take 150 mg by mouth 2 times daily. , Disp: , Rfl:   Lab Results   Component Value Date     12/27/2021    K 5.1 (H) 12/27/2021     (H) 12/27/2021    CO2 20 12/27/2021    BUN 39 (H) 12/27/2021    CREATININE 1.79 (H) 12/27/2021    GLUCOSE 308 01/08/2022    CALCIUM 8.9 12/27/2021    PROT 7.7 04/16/2020    LABALBU 4.3 09/22/2021    BILITOT 0.3 04/16/2020    ALKPHOS 115 (H) 04/16/2020    AST 9 04/16/2020    ALT 10 04/16/2020    LABGLOM 36.9 (L) 12/27/2021    GFRAA 44.6 (L) 12/27/2021    GLOB 3.3 04/16/2020     Lab Results   Component Value Date    WBC 5.5 09/22/2021    HGB 11.8 (L) 09/22/2021    HCT 35.0 (L) 09/22/2021    MCV 91.2 09/22/2021     09/22/2021     Lab Results   Component Value Date    LABA1C 7.9 (H) 12/27/2021    LABA1C 9.8 06/26/2021    LABA1C 12.4 12/11/2020     Lab Results   Component Value Date    HDL 47 01/13/2020    HDL 50 12/03/2018    HDL 46 02/18/2015    LDLCALC 108 01/13/2020    LDLCALC 180 (H) 12/03/2018    LDLCALC 83 02/18/2015    CHOL 206 (H) 01/13/2020    CHOL 281 (H) 12/03/2018    CHOL 158 02/18/2015    TRIG 253 (H) 01/13/2020    TRIG 253 (H) 12/03/2018    TRIG 145 02/18/2015     No results found for: TESTM  Lab Results   Component Value Date    TSH 2.190 02/18/2015    TSH 1.465 10/10/2011     No results found for: TPOABS    Review of Systems    Objective:   Physical Exam  Vitals reviewed. Constitutional:       Appearance: Normal appearance. He is obese. HENT:      Head: Normocephalic and atraumatic. Hair is normal.      Right Ear: External ear normal.      Left Ear: External ear normal.      Nose: Nose normal.   Eyes:      General: No scleral icterus. Right eye: No discharge. Left eye: No discharge. Extraocular Movements: Extraocular movements intact.       Conjunctiva/sclera: Conjunctivae normal.   Neck:      Trachea: Trachea normal.   Cardiovascular:      Rate and

## 2022-01-26 RX ORDER — GABAPENTIN 300 MG/1
CAPSULE ORAL
Qty: 180 CAPSULE | Refills: 1 | Status: SHIPPED | OUTPATIENT
Start: 2022-01-26 | End: 2022-02-25

## 2022-06-07 RX ORDER — INSULIN LISPRO 100 [IU]/ML
INJECTION, SUSPENSION SUBCUTANEOUS
Qty: 45 ML | Refills: 1 | Status: SHIPPED | OUTPATIENT
Start: 2022-06-07

## 2022-07-09 ENCOUNTER — OFFICE VISIT (OUTPATIENT)
Dept: ENDOCRINOLOGY | Age: 77
End: 2022-07-09
Payer: COMMERCIAL

## 2022-07-09 VITALS
BODY MASS INDEX: 33.43 KG/M2 | WEIGHT: 208 LBS | SYSTOLIC BLOOD PRESSURE: 122 MMHG | OXYGEN SATURATION: 94 % | HEIGHT: 66 IN | HEART RATE: 68 BPM | DIASTOLIC BLOOD PRESSURE: 78 MMHG

## 2022-07-09 DIAGNOSIS — Z79.4 TYPE 2 DIABETES MELLITUS WITH OTHER SPECIFIED COMPLICATION, WITH LONG-TERM CURRENT USE OF INSULIN (HCC): Primary | ICD-10-CM

## 2022-07-09 DIAGNOSIS — E11.69 TYPE 2 DIABETES MELLITUS WITH OTHER SPECIFIED COMPLICATION, WITH LONG-TERM CURRENT USE OF INSULIN (HCC): Primary | ICD-10-CM

## 2022-07-09 PROCEDURE — 1036F TOBACCO NON-USER: CPT | Performed by: INTERNAL MEDICINE

## 2022-07-09 PROCEDURE — G8417 CALC BMI ABV UP PARAM F/U: HCPCS | Performed by: INTERNAL MEDICINE

## 2022-07-09 PROCEDURE — G8427 DOCREV CUR MEDS BY ELIG CLIN: HCPCS | Performed by: INTERNAL MEDICINE

## 2022-07-09 PROCEDURE — 99213 OFFICE O/P EST LOW 20 MIN: CPT | Performed by: INTERNAL MEDICINE

## 2022-07-09 PROCEDURE — 1123F ACP DISCUSS/DSCN MKR DOCD: CPT | Performed by: INTERNAL MEDICINE

## 2022-07-09 ASSESSMENT — ENCOUNTER SYMPTOMS: EYES NEGATIVE: 1

## 2022-07-09 NOTE — PROGRESS NOTES
insulin pre-breakfast and pre-dinner. His overall blood glucose range is 180-200 mg/dl. Past Medical History:   Diagnosis Date    Arthritis     Diabetic neuropathy ([facility]     Elevated PSA     Hyperlipidemia     Hypertension     Osteoarthritis     [facility]    Type II diabetes mellitus, uncontrolled ([facility]      Past Surgical History:   Procedure Laterality Date    CHOLECYSTECTOMY      CIRCUMCISION, NON-      OTHER SURGICAL HISTORY      gallstones removed    TONSILLECTOMY      ULTRASOUND PROSTATE/TRANSRECTAL N/A 2017    PROSTATE TRANSRECTAL ULTRASOUND BIOPSY performed by Marianela Almaraz MD at [address] [place] [address] History     Socioeconomic History    Marital status:      Spouse name: Not on file    Number of children: Not on file    Years of education: Not on file    Highest education level: Not on file   Occupational History    Not on file   Tobacco Use    Smoking status: Former Smoker     Packs/day: 0.25     Types: Cigarettes     Quit date: 1/3/2017     Years since quittin.5    Smokeless tobacco: Never Used    Tobacco comment: couple cigarettes a day   Substance and Sexual Activity    Alcohol use: No     Comment: rarely- 1 beer monthly    Drug use: No    Sexual activity: Never   Other Topics Concern    Not on file   Social History Narrative    Not on file     Social Determinants of Health     Financial Resource Strain:     Difficulty of Paying Living Expenses: Not on file   Food Insecurity:     Worried About [address] in the Last Year: Not on file    [address] in the Last Year: Not on file   Transportation Needs:     Lack of Transportation (Medical): Not on file    Lack of Transportation (Non-Medical):  Not on file   Physical Activity:     Days of Exercise per Week: Not on file    Minutes of Exercise per Session: Not on file   Stress:     Feeling of Stress : Not on file   Social Connections:     Frequency of Communication with Friends and Family: Not on file    Frequency of Social Gatherings with Friends and Family: Not on file    Attends Hindu Services: Not on file    Active Member of Clubs or Organizations: Not on file    Attends Club or Organization Meetings: Not on file    Marital Status: Not on file   Intimate Partner Violence:     Fear of Current or Ex-Partner: Not on file    Emotionally Abused: Not on file    Physically Abused: Not on file    Sexually Abused: Not on file   Housing Stability:     Unable to Pay for Housing in the Last Year: Not on file    Number of Places Lived in the Last Year: Not on file    Unstable Housing in the Last Year: Not on file     Family History   Problem Relation Age of Onset    Diabetes Mother      Allergies   Allergen Reactions    Acetaminophen     Glipizide     Propoxyphene      Other reaction(s): Unknown    Propoxyphene And Methadone [Methadone]        Current Outpatient Medications:     insulin lispro protamine & lispro (HUMALOG MIX 75/25 KWIKPEN) (75-25) 100 UNIT per ML SUPN injection pen, INJECT 25 UNITS SUBCUTANEOUSLY EVERY MORNING AND INJECT 35 UNITS SUBCUTANEOUSLY EVERY EVENING WITH DINNER, Disp: 45 mL, Rfl: 1    simvastatin (ZOCOR) 40 MG tablet, TAKE 1 TABLET BY MOUTH EVERY DAY IN THE EVENING, Disp: , Rfl:     Insulin Pen Needle (B-D UF III MINI PEN NEEDLES) 31G X 5 MM MISC, USE TWICE DAILY, Disp: 100 each, Rfl: 5    lisinopril (PRINIVIL;ZESTRIL) 40 MG tablet, Take 40 mg by mouth daily, Disp: , Rfl:     blood glucose test strips (ONE TOUCH ULTRA TEST) strip, USE TO TEST GLUCOSE FOUR TIMES DAILY, Disp: 300 strip, Rfl: 3    calcitRIOL (ROCALTROL) 0.25 MCG capsule, Take 0.25 mcg by mouth daily, Disp: , Rfl:     pantoprazole (PROTONIX) 40 MG tablet, Take 40 mg by mouth daily, Disp: , Rfl:     carvedilol (COREG) 6.25 MG tablet, Take 6.5 mg by mouth daily, Disp: , Rfl:     Insulin Pen Needle (NOVOFINE) 32G X 6 MM MISC, USE TWICE DAILY, DX: E11.65, IDDM, Disp: 200 each, Rfl: 3    amLODIPine (NORVASC) 5 MG tablet, TAKE 1 TABLET BY MOUTH DAILY, Disp: 90 tablet, Rfl: 1    hydrochlorothiazide (HYDRODIURIL) 25 MG tablet, TAKE 1 TABLET BY MOUTH DAILY, Disp: 90 tablet, Rfl: 1    ranitidine (ZANTAC) 150 MG tablet, Take 150 mg by mouth 2 times daily. , Disp: , Rfl:     gabapentin (NEURONTIN) 300 MG capsule, TAKE ONE CAPSULE BY MOUTH TWICE DAILY, Disp: 180 capsule, Rfl: 1  Lab Results   Component Value Date     04/13/2022    K 5.2 (H) 04/13/2022     04/13/2022    CO2 23 04/13/2022    BUN 25 (H) 04/13/2022    CREATININE 1.50 (H) 04/13/2022    GLUCOSE 158 (H) 04/13/2022    CALCIUM 9.1 04/13/2022    PROT 7.7 04/16/2020    LABALBU 4.3 04/13/2022    BILITOT 0.3 04/16/2020    ALKPHOS 115 (H) 04/16/2020    AST 9 04/16/2020    ALT 10 04/16/2020    LABGLOM 45.2 (L) 04/13/2022    GFRAA 54.7 (L) 04/13/2022    GLOB 3.3 04/16/2020     Lab Results   Component Value Date    WBC 5.5 09/22/2021    HGB 11.8 (L) 09/22/2021    HCT 35.0 (L) 09/22/2021    MCV 91.2 09/22/2021     09/22/2021     Lab Results   Component Value Date    LABA1C 7.9 (H) 12/27/2021    LABA1C 9.8 06/26/2021    LABA1C 12.4 12/11/2020     Lab Results   Component Value Date    HDL 47 01/13/2020    HDL 50 12/03/2018    HDL 46 02/18/2015    LDLCALC 108 01/13/2020    LDLCALC 180 (H) 12/03/2018    LDLCALC 83 02/18/2015    CHOL 206 (H) 01/13/2020    CHOL 281 (H) 12/03/2018    CHOL 158 02/18/2015    TRIG 253 (H) 01/13/2020    TRIG 253 (H) 12/03/2018    TRIG 145 02/18/2015     No results found for: TESTM  Lab Results   Component Value Date    TSH 2.190 02/18/2015    TSH 1.465 10/10/2011     No results found for: TPOABS    Review of Systems   Constitutional: Positive for fatigue. Eyes: Negative. Cardiovascular: Negative. Endocrine: Negative for polydipsia and polyuria. Musculoskeletal: Positive for arthralgias. All other systems reviewed and are negative. Objective:   Physical Exam  Vitals reviewed. Constitutional:       General: He is not in acute distress. Appearance: Normal appearance. He is obese. HENT:      Head: Normocephalic and atraumatic. Right Ear: External ear normal.      Left Ear: External ear normal.      Nose: Nose normal.   Eyes:      General: No scleral icterus. Right eye: No discharge. Left eye: No discharge. Extraocular Movements: Extraocular movements intact. Conjunctiva/sclera: Conjunctivae normal.   Cardiovascular:      Rate and Rhythm: Normal rate. Pulmonary:      Effort: Pulmonary effort is normal.   Musculoskeletal:         General: Normal range of motion. Cervical back: Normal range of motion and neck supple. Neurological:      General: No focal deficit present. Mental Status: He is alert and oriented to person, place, and time.    Psychiatric:         Mood and Affect: Mood normal.         Behavior: Behavior normal.

## 2022-10-30 ENCOUNTER — HOSPITAL ENCOUNTER (EMERGENCY)
Age: 77
Discharge: HOME | End: 2022-10-30
Payer: COMMERCIAL

## 2022-10-30 VITALS
TEMPERATURE: 97.8 F | OXYGEN SATURATION: 99 % | DIASTOLIC BLOOD PRESSURE: 72 MMHG | RESPIRATION RATE: 18 BRPM | SYSTOLIC BLOOD PRESSURE: 155 MMHG | HEART RATE: 73 BPM

## 2022-10-30 DIAGNOSIS — M54.50 ACUTE EXACERBATION OF CHRONIC LOW BACK PAIN: Primary | ICD-10-CM

## 2022-10-30 DIAGNOSIS — G89.29 ACUTE EXACERBATION OF CHRONIC LOW BACK PAIN: Primary | ICD-10-CM

## 2022-10-30 PROCEDURE — 99283 EMERGENCY DEPT VISIT LOW MDM: CPT

## 2022-10-30 PROCEDURE — 6370000000 HC RX 637 (ALT 250 FOR IP): Performed by: NURSE PRACTITIONER

## 2022-10-30 RX ORDER — OXYCODONE HYDROCHLORIDE AND ACETAMINOPHEN 5; 325 MG/1; MG/1
1 TABLET ORAL EVERY 6 HOURS PRN
Qty: 10 TABLET | Refills: 0 | Status: SHIPPED | OUTPATIENT
Start: 2022-10-30 | End: 2022-11-02

## 2022-10-30 RX ORDER — OXYCODONE HYDROCHLORIDE AND ACETAMINOPHEN 5; 325 MG/1; MG/1
1 TABLET ORAL ONCE
Status: COMPLETED | OUTPATIENT
Start: 2022-10-30 | End: 2022-10-30

## 2022-10-30 RX ADMIN — OXYCODONE AND ACETAMINOPHEN 1 TABLET: 5; 325 TABLET ORAL at 09:42

## 2022-10-30 ASSESSMENT — PAIN SCALES - GENERAL
PAINLEVEL_OUTOF10: 10
PAINLEVEL_OUTOF10: 10

## 2022-10-30 ASSESSMENT — LIFESTYLE VARIABLES
HOW OFTEN DO YOU HAVE A DRINK CONTAINING ALCOHOL: NEVER
HOW MANY STANDARD DRINKS CONTAINING ALCOHOL DO YOU HAVE ON A TYPICAL DAY: PATIENT DOES NOT DRINK

## 2022-10-30 ASSESSMENT — PAIN DESCRIPTION - ORIENTATION
ORIENTATION: LEFT
ORIENTATION: LEFT

## 2022-10-30 ASSESSMENT — ENCOUNTER SYMPTOMS
VOMITING: 0
NAUSEA: 0
DIARRHEA: 0
TROUBLE SWALLOWING: 0
SHORTNESS OF BREATH: 0
COUGH: 0
SORE THROAT: 0
ABDOMINAL PAIN: 0
BACK PAIN: 1

## 2022-10-30 ASSESSMENT — PAIN DESCRIPTION - LOCATION
LOCATION: BACK
LOCATION: BACK;LEG

## 2022-10-30 ASSESSMENT — PAIN - FUNCTIONAL ASSESSMENT
PAIN_FUNCTIONAL_ASSESSMENT: 0-10
PAIN_FUNCTIONAL_ASSESSMENT: NONE - DENIES PAIN

## 2022-10-30 ASSESSMENT — PAIN DESCRIPTION - DESCRIPTORS
DESCRIPTORS: SHOOTING
DESCRIPTORS: ACHING

## 2022-10-30 NOTE — ED PROVIDER NOTES
[address] [facility] ED  eMERGENCY dEPARTMENT eNCOUnter      Pt Name: Ani Dawn  MRN: [id number]  [place] 1945  Date of evaluation: 10/30/2022  Provider: COLLIN Saenz CNP      HISTORY OF PRESENT ILLNESS    Ani Dawn is a 76 y.o. male who presents to the Emergency Department with low back pain. Patient states pain has been getting worse in the last few weeks. He denies any injury or trauma. States he has arthritis in his back. He takes Tylenol but it is not helping enough. Pain is moderate to severe. He is able to ambulate with his cane. He denies saddle anesthesia, foot drop or incontinence of bowel or bladder. REVIEW OF SYSTEMS       Review of Systems   Constitutional:  Negative for activity change, appetite change and fever. HENT:  Negative for congestion, drooling, ear pain, sore throat and trouble swallowing. Respiratory:  Negative for cough and shortness of breath. Cardiovascular:  Negative for chest pain. Gastrointestinal:  Negative for abdominal pain, diarrhea, nausea and vomiting. Genitourinary:  Negative for dysuria. Musculoskeletal:  Positive for back pain. Negative for arthralgias and neck pain. Skin:  Negative for rash. Neurological:  Negative for dizziness, syncope, light-headedness and headaches. All other systems reviewed and are negative.       PAST MEDICAL HISTORY     Past Medical History:   Diagnosis Date    Arthritis     Diabetic neuropathy (HCC)     Elevated PSA     Hyperlipidemia     Hypertension     Osteoarthritis     Prostate cancer ([facility] [address] 2017    Type II diabetes mellitus, uncontrolled          SURGICAL HISTORY       Past Surgical History:   Procedure Laterality Date    CHOLECYSTECTOMY      CIRCUMCISION, NON-      OTHER SURGICAL HISTORY      gallstones removed    TONSILLECTOMY      ULTRASOUND PROSTATE/TRANSRECTAL N/A 2017    PROSTATE TRANSRECTAL ULTRASOUND BIOPSY performed by Alexa Zaman MD at [facility] CURRENT MEDICATIONS       Previous Medications    AMLODIPINE (NORVASC) 5 MG TABLET    TAKE 1 TABLET BY MOUTH DAILY    BLOOD GLUCOSE TEST STRIPS (ASCENSIA AUTODISC VI;ONE TOUCH ULTRA TEST VI) STRIP    1 each by In Vitro route daily Patient test 3x daily E11.65    BLOOD GLUCOSE TEST STRIPS (ONE TOUCH ULTRA TEST) STRIP    USE TO TEST GLUCOSE FOUR TIMES DAILY    CALCITRIOL (ROCALTROL) 0.25 MCG CAPSULE    Take 0.25 mcg by mouth daily    CARVEDILOL (COREG) 6.25 MG TABLET    Take 6.5 mg by mouth daily    GABAPENTIN (NEURONTIN) 300 MG CAPSULE    TAKE ONE CAPSULE BY MOUTH TWICE DAILY    HYDROCHLOROTHIAZIDE (HYDRODIURIL) 25 MG TABLET    TAKE 1 TABLET BY MOUTH DAILY    INSULIN LISPRO PROTAMINE & LISPRO (HUMALOG MIX 75/25 KWIKPEN) (75-25) 100 UNIT PER ML SUPN INJECTION PEN    INJECT 25 UNITS SUBCUTANEOUSLY EVERY MORNING AND INJECT 35 UNITS SUBCUTANEOUSLY EVERY EVENING WITH DINNER    INSULIN PEN NEEDLE (B-D UF III MINI PEN NEEDLES) 31G X 5 MM MISC    USE TWICE DAILY    INSULIN PEN NEEDLE (NOVOFINE) 32G X 6 MM MISC    USE TWICE DAILY, DX: E11.65, IDDM    LISINOPRIL (PRINIVIL;ZESTRIL) 40 MG TABLET    Take 40 mg by mouth daily    PANTOPRAZOLE (PROTONIX) 40 MG TABLET    Take 40 mg by mouth daily    RANITIDINE (ZANTAC) 150 MG TABLET    Take 150 mg by mouth 2 times daily.     SIMVASTATIN (ZOCOR) 40 MG TABLET    TAKE 1 TABLET BY MOUTH EVERY DAY IN THE EVENING       ALLERGIES     Acetaminophen, Glipizide, Propoxyphene, and Propoxyphene and methadone [methadone]    FAMILY HISTORY       Family History   Problem Relation Age of Onset    Diabetes Mother           SOCIAL HISTORY       Social History     Socioeconomic History    Marital status:      Spouse name: None    Number of children: None    Years of education: None    Highest education level: None   Tobacco Use    Smoking status: Former     Packs/day: 0.25     Types: Cigarettes     Quit date: 1/3/2017     Years since quittin.8    Smokeless tobacco: Never    Tobacco comments:     couple cigarettes a day   Vaping Use    Vaping Use: Never used   Substance and Sexual Activity    Alcohol use: No     Comment: rarely- 1 beer monthly    Drug use: No    Sexual activity: Never       SCREENINGS    Lexi Coma Scale  Eye Opening: Spontaneous  Best Verbal Response: Oriented  Best Motor Response: Obeys commands  [place] Coma Scale Score: 15 @FLOW([id number])@      PHYSICAL EXAM    (up to 7 for level 4, 8 or more for level 5)     ED Triage Vitals [10/30/22 0915]   BP Temp Temp src Heart Rate Resp SpO2 Height Weight   (!) 155/72 97.8 °F (36.6 °C) -- 73 18 99 % -- --       Physical Exam  Vitals and nursing note reviewed. Constitutional:       Appearance: He is well-developed. HENT:      Head: Normocephalic and atraumatic. Right Ear: Hearing, tympanic membrane, ear canal and external ear normal.      Left Ear: Hearing, tympanic membrane, ear canal and external ear normal.      Nose: Nose normal.      Mouth/Throat:      Lips: Pink. Mouth: Mucous membranes are moist.   Eyes:      Conjunctiva/sclera: Conjunctivae normal.      Pupils: Pupils are equal, round, and reactive to light. Cardiovascular:      Rate and Rhythm: Normal rate and regular rhythm. Heart sounds: Normal heart sounds. Pulmonary:      Effort: Pulmonary effort is normal. No accessory muscle usage or respiratory distress. Breath sounds: Normal breath sounds. No decreased air movement. No decreased breath sounds, wheezing or rhonchi. Abdominal:      General: Bowel sounds are normal. There is no distension. Palpations: Abdomen is soft. Tenderness: There is no abdominal tenderness. Musculoskeletal:         General: Normal range of motion. Cervical back: Normal range of motion and neck supple. Lumbar back: Tenderness present. No swelling, edema, deformity or signs of trauma. Back:    Skin:     General: Skin is warm and dry. Neurological:      General: No focal deficit present. Mental Status: He is alert and oriented to person, place, and time. GCS: GCS eye subscore is 4. GCS verbal subscore is 5. GCS motor subscore is 6. Deep Tendon Reflexes: Reflexes are normal and symmetric. Psychiatric:         Judgment: Judgment normal.         All other labs were within normal range or not returned as of this dictation. EMERGENCY DEPARTMENT COURSE and DIFFERENTIALDIAGNOSIS/MDM:   Vitals:    Vitals:    10/30/22 0915 10/30/22 0942   BP: (!) 155/72    Pulse: 73    Resp: 18 18   Temp: 97.8 °F (36.6 °C)    SpO2: 99%             76 yr old male with acute on chronic low back pain. F/U with pain management as discussed. Patient and daughter at bedside verbalize understanding. PROCEDURES:  Unless otherwise noted below, none     Procedures      FINAL IMPRESSION      1.  Acute exacerbation of chronic low back pain          DISPOSITION/PLAN   DISPOSITION Decision To Discharge 10/30/2022 09:38:55 AM          COLLIN Milian CNP (electronically signed)  Attending Emergency Physician      COLLIN Milian CNP  10/30/22 0945

## 2022-10-30 NOTE — ED TRIAGE NOTES
Left side lower back pain with radiation down the left inner leg. Patient reports history of chronic back pain but states pain is worse today.

## 2022-11-10 DIAGNOSIS — E11.69 TYPE 2 DIABETES MELLITUS WITH OTHER SPECIFIED COMPLICATION, WITH LONG-TERM CURRENT USE OF INSULIN (HCC): ICD-10-CM

## 2022-11-10 DIAGNOSIS — Z79.4 TYPE 2 DIABETES MELLITUS WITH OTHER SPECIFIED COMPLICATION, WITH LONG-TERM CURRENT USE OF INSULIN (HCC): ICD-10-CM

## 2022-11-10 LAB
ANION GAP SERPL CALCULATED.3IONS-SCNC: 11 MEQ/L (ref 9–15)
BUN BLDV-MCNC: 43 MG/DL (ref 8–23)
CALCIUM SERPL-MCNC: 9 MG/DL (ref 8.5–9.9)
CHLORIDE BLD-SCNC: 107 MEQ/L (ref 95–107)
CHOLESTEROL, FASTING: 175 MG/DL (ref 0–199)
CO2: 24 MEQ/L (ref 20–31)
CREAT SERPL-MCNC: 1.71 MG/DL (ref 0.7–1.2)
GFR SERPL CREATININE-BSD FRML MDRD: 40.2 ML/MIN/{1.73_M2}
GLUCOSE FASTING: 96 MG/DL (ref 70–99)
HBA1C MFR BLD: 7 % (ref 4.8–5.9)
HDLC SERPL-MCNC: 43 MG/DL (ref 40–59)
LDL CHOLESTEROL CALCULATED: 88 MG/DL (ref 0–129)
POTASSIUM SERPL-SCNC: 5.8 MEQ/L (ref 3.4–4.9)
SODIUM BLD-SCNC: 142 MEQ/L (ref 135–144)
TRIGLYCERIDE, FASTING: 218 MG/DL (ref 0–150)

## 2022-11-12 ENCOUNTER — OFFICE VISIT (OUTPATIENT)
Dept: ENDOCRINOLOGY | Age: 77
End: 2022-11-12
Payer: COMMERCIAL

## 2022-11-12 VITALS
OXYGEN SATURATION: 97 % | WEIGHT: 204 LBS | BODY MASS INDEX: 32.78 KG/M2 | HEIGHT: 66 IN | DIASTOLIC BLOOD PRESSURE: 62 MMHG | SYSTOLIC BLOOD PRESSURE: 110 MMHG | HEART RATE: 68 BPM

## 2022-11-12 DIAGNOSIS — Z79.4 TYPE 2 DIABETES MELLITUS WITH OTHER SPECIFIED COMPLICATION, WITH LONG-TERM CURRENT USE OF INSULIN (HCC): ICD-10-CM

## 2022-11-12 DIAGNOSIS — E11.69 TYPE 2 DIABETES MELLITUS WITH OTHER SPECIFIED COMPLICATION, WITH LONG-TERM CURRENT USE OF INSULIN (HCC): ICD-10-CM

## 2022-11-12 LAB
CHP ED QC CHECK: ABNORMAL
GLUCOSE BLD-MCNC: 213 MG/DL

## 2022-11-12 PROCEDURE — 82962 GLUCOSE BLOOD TEST: CPT | Performed by: INTERNAL MEDICINE

## 2022-11-12 PROCEDURE — 1036F TOBACCO NON-USER: CPT | Performed by: INTERNAL MEDICINE

## 2022-11-12 PROCEDURE — 3078F DIAST BP <80 MM HG: CPT | Performed by: INTERNAL MEDICINE

## 2022-11-12 PROCEDURE — 3074F SYST BP LT 130 MM HG: CPT | Performed by: INTERNAL MEDICINE

## 2022-11-12 PROCEDURE — 1123F ACP DISCUSS/DSCN MKR DOCD: CPT | Performed by: INTERNAL MEDICINE

## 2022-11-12 PROCEDURE — G8484 FLU IMMUNIZE NO ADMIN: HCPCS | Performed by: INTERNAL MEDICINE

## 2022-11-12 PROCEDURE — G8427 DOCREV CUR MEDS BY ELIG CLIN: HCPCS | Performed by: INTERNAL MEDICINE

## 2022-11-12 PROCEDURE — G8417 CALC BMI ABV UP PARAM F/U: HCPCS | Performed by: INTERNAL MEDICINE

## 2022-11-12 PROCEDURE — 3051F HG A1C>EQUAL 7.0%<8.0%: CPT | Performed by: INTERNAL MEDICINE

## 2022-11-12 PROCEDURE — 99213 OFFICE O/P EST LOW 20 MIN: CPT | Performed by: INTERNAL MEDICINE

## 2022-11-12 RX ORDER — INSULIN LISPRO 100 [IU]/ML
INJECTION, SUSPENSION SUBCUTANEOUS
Qty: 45 ML | Refills: 1 | Status: SHIPPED | OUTPATIENT
Start: 2022-11-12

## 2022-11-12 RX ORDER — PEN NEEDLE, DIABETIC 31 GX5/16"
NEEDLE, DISPOSABLE MISCELLANEOUS
Qty: 100 EACH | Refills: 5 | Status: SHIPPED | OUTPATIENT
Start: 2022-11-12

## 2022-11-12 RX ORDER — GABAPENTIN 300 MG/1
CAPSULE ORAL
Qty: 180 CAPSULE | Refills: 1 | Status: SHIPPED | OUTPATIENT
Start: 2022-11-12 | End: 2022-12-29

## 2022-11-12 RX ORDER — LISINOPRIL 20 MG/1
TABLET ORAL
COMMUNITY
Start: 2022-10-17

## 2022-11-12 ASSESSMENT — ENCOUNTER SYMPTOMS: EYES NEGATIVE: 1

## 2022-11-12 NOTE — PROGRESS NOTES
2022    Assessment:       Diagnosis Orders   1. Type 2 diabetes mellitus with other specified complication, with long-term current use of insulin ([facility])  POCT Glucose            PLAN:     Orders Placed This Encounter   Procedures    Comprehensive Metabolic Panel     Standing Status:   Future     Standing Expiration Date:   2023    Hemoglobin A1C     Standing Status:   Future     Standing Expiration Date:   2023    POCT Glucose     Lower dose of  PM insulin  Change dose to 25 units twice daily  Orders Placed This Encounter   Procedures    Comprehensive Metabolic Panel     Standing Status:   Future     Standing Expiration Date:   2023    Hemoglobin A1C     Standing Status:   Future     Standing Expiration Date:   2023    POCT Glucose     Orders Placed This Encounter   Medications    insulin lispro protamine & lispro (HUMALOG MIX 75/25 KWIKPEN) (75-25) 100 UNIT per ML SUPN injection pen     Sig: INJECT 25 UNITS SUBCUTANEOUSLY EVERY MORNING AND INJECT 25 UNITS SUBCUTANEOUSLY EVERY EVENING WITH DINNER     Dispense:  45 mL     Refill:  1    Insulin Pen Needle (B-D UF III MINI PEN NEEDLES) 31G X 5 MM MISC     Sig: USE TWICE DAILY     Dispense:  100 each     Refill:  5    blood glucose test strips (ASCENSIA AUTODISC VI;ONE TOUCH ULTRA TEST VI) strip     Si each by In Vitro route daily Patient test 3x daily E11.65     Dispense:  100 each     Refill:  3    gabapentin (NEURONTIN) 300 MG capsule     Sig: TAKE ONE CAPSULE BY MOUTH TWICE DAILY     Dispense:  180 capsule     Refill:  1         Orders Placed This Encounter   Procedures    POCT Glucose     No orders of the defined types were placed in this encounter. No follow-ups on file.   Subjective:     Chief Complaint   Patient presents with    Follow-up    Diabetes     Vitals:    22 0933   BP: (!) 108/58   Site: Left Upper Arm   Pulse: 68   SpO2: 97%   Weight: 204 lb (92.5 kg)   Height: 5' 6\" (1.676 m)     Wt Readings from Last 3 Encounters:   22 204 lb (92.5 kg)   22 208 lb (94.3 kg)   22 210 lb (95.3 kg)     BP Readings from Last 3 Encounters:   22 (!) 108/58   10/30/22 (!) 155/72   22 122/78     Follow-up on type 2 diabetes history of chronic kidney disease patient is on insulin injections twice daily takes 20 5 in the morning 30 5 in the evening has had lower blood sugars in the morning testing twice daily history of chronic kidney disease neuropathy hemoglobin A1c was 7.0    Diabetes  He presents for his follow-up diabetic visit. He has type 2 diabetes mellitus. Associated symptoms include fatigue. Pertinent negatives for diabetes include no polydipsia, no polyuria and no weight loss. Symptoms are improving. Diabetic complications include nephropathy and peripheral neuropathy. Current diabetic treatment includes insulin injections. His overall blood glucose range is 140-180 mg/dl.  (Hemoglobin A1C       Date                     Value               Ref Range           Status                11/10/2022               7.0 (H)             4.8 - 5.9 %         Final            ----------  )   Past Medical History:   Diagnosis Date    Arthritis     Diabetic neuropathy (HCC)     Elevated PSA     Hyperlipidemia     Hypertension     Osteoarthritis     Prostate cancer ([facility] [address] 2017    Type II diabetes mellitus, uncontrolled      Past Surgical History:   Procedure Laterality Date    CHOLECYSTECTOMY      CIRCUMCISION, NON-      OTHER SURGICAL HISTORY      gallstones removed    TONSILLECTOMY      ULTRASOUND PROSTATE/TRANSRECTAL N/A 2017    PROSTATE [facility]     Socioeconomic History    Marital status:      Spouse name: Not on file    Number of children: Not on file    Years of education: Not on file    Highest education level: Not on file   Occupational History    Not on file   Tobacco Use    Smoking status: Former Packs/day: 0.25     Types: Cigarettes     Quit date: 1/3/2017     Years since quittin.8    Smokeless tobacco: Never    Tobacco comments:     couple cigarettes a day   Vaping Use    Vaping Use: Never used   Substance and Sexual Activity    Alcohol use: No     Comment: rarely- 1 beer monthly    Drug use: No    Sexual activity: Never   Other Topics Concern    Not on file   Social History Narrative    Not on file     Social Determinants of Health     Financial Resource Strain: Not on file   Food Insecurity: Not on file   Transportation Needs: Not on file   Physical Activity: Not on file   Stress: Not on file   Social Connections: Not on file   Intimate Partner Violence: Not on file   Housing Stability: Not on file     Family History   Problem Relation Age of Onset    Diabetes Mother      Allergies   Allergen Reactions    Acetaminophen     Glipizide     Propoxyphene      Other reaction(s): Unknown    Propoxyphene And Methadone [Methadone]        Current Outpatient Medications:     lisinopril (PRINIVIL;ZESTRIL) 20 MG tablet, TAKE 1 TABLET BY MOUTH EVERY DAY, Disp: , Rfl:     blood glucose test strips (ASCENSIA AUTODISC VI;ONE TOUCH ULTRA TEST VI) strip, 1 each by In Vitro route daily Patient test 3x daily E11.65, Disp: 100 each, Rfl: 3    insulin lispro protamine & lispro (HUMALOG MIX 75/25 KWIKPEN) (75-25) 100 UNIT per ML SUPN injection pen, INJECT 25 UNITS SUBCUTANEOUSLY EVERY MORNING AND INJECT 35 UNITS SUBCUTANEOUSLY EVERY EVENING WITH DINNER, Disp: 45 mL, Rfl: 1    simvastatin (ZOCOR) 40 MG tablet, TAKE 1 TABLET BY MOUTH EVERY DAY IN THE EVENING, Disp: , Rfl:     Insulin Pen Needle (B-D UF III MINI PEN NEEDLES) 31G X 5 MM MISC, USE TWICE DAILY, Disp: 100 each, Rfl: 5    blood glucose test strips (ONE TOUCH ULTRA TEST) strip, USE TO TEST GLUCOSE FOUR TIMES DAILY, Disp: 300 strip, Rfl: 3    calcitRIOL (ROCALTROL) 0.25 MCG capsule, Take 0.25 mcg by mouth daily, Disp: , Rfl:     pantoprazole (PROTONIX) 40 MG tablet, Take 40 mg by mouth daily, Disp: , Rfl:     carvedilol (COREG) 6.25 MG tablet, Take 6.5 mg by mouth daily, Disp: , Rfl:     amLODIPine (NORVASC) 5 MG tablet, TAKE 1 TABLET BY MOUTH DAILY, Disp: 90 tablet, Rfl: 1    hydrochlorothiazide (HYDRODIURIL) 25 MG tablet, TAKE 1 TABLET BY MOUTH DAILY, Disp: 90 tablet, Rfl: 1    ranitidine (ZANTAC) 150 MG tablet, Take 150 mg by mouth 2 times daily. , Disp: , Rfl:     gabapentin (NEURONTIN) 300 MG capsule, TAKE ONE CAPSULE BY MOUTH TWICE DAILY (Patient not taking: No sig reported), Disp: 180 capsule, Rfl: 1  Lab Results   Component Value Date     11/10/2022    K 5.8 (H) 11/10/2022     11/10/2022    CO2 24 11/10/2022    BUN 43 (H) 11/10/2022    CREATININE 1.71 (H) 11/10/2022    GLUCOSE 77 10/11/2022    CALCIUM 9.0 11/10/2022    PROT 7.7 04/16/2020    LABALBU 4.5 10/11/2022    BILITOT 0.3 04/16/2020    ALKPHOS 115 (H) 04/16/2020    AST 9 04/16/2020    ALT 10 04/16/2020    LABGLOM 40.2 (L) 11/10/2022    GFRAA 42.9 (L) 10/11/2022    GLOB 3.3 04/16/2020     Lab Results   Component Value Date    WBC 4.8 10/11/2022    HGB 10.9 (L) 10/11/2022    HCT 33.2 (L) 10/11/2022    MCV 92.7 10/11/2022     10/11/2022     Lab Results   Component Value Date    LABA1C 7.0 (H) 11/10/2022    LABA1C 7.9 (H) 12/27/2021    LABA1C 9.8 06/26/2021     Lab Results   Component Value Date    CHOLFAST 175 11/10/2022    TRIGLYCFAST 218 (H) 11/10/2022    HDL 43 11/10/2022    HDL 47 01/13/2020    HDL 50 12/03/2018    LDLCALC 88 11/10/2022    LDLCALC 108 01/13/2020    LDLCALC 180 (H) 12/03/2018    CHOL 206 (H) 01/13/2020    CHOL 281 (H) 12/03/2018    CHOL 158 02/18/2015    TRIG 253 (H) 01/13/2020    TRIG 253 (H) 12/03/2018    TRIG 145 02/18/2015     No results found for: TESTM  Lab Results   Component Value Date    TSH 2.190 02/18/2015    TSH 1.465 10/10/2011     No results found for: TPOABS    Review of Systems   Constitutional:  Positive for fatigue. Negative for weight loss. Eyes: Negative. Cardiovascular: Negative. Endocrine: Negative. Negative for polydipsia and polyuria. Musculoskeletal:  Positive for arthralgias. All other systems reviewed and are negative. Objective:   Physical Exam  Vitals reviewed. Constitutional:       General: He is not in acute distress. Appearance: Normal appearance. He is obese. HENT:      Head: Normocephalic and atraumatic. Right Ear: External ear normal.      Left Ear: External ear normal.      Nose: Nose normal.   Eyes:      General: No scleral icterus. Right eye: No discharge. Left eye: No discharge. Extraocular Movements: Extraocular movements intact. Conjunctiva/sclera: Conjunctivae normal.   Cardiovascular:      Rate and Rhythm: Normal rate. Pulmonary:      Effort: Pulmonary effort is normal.   Musculoskeletal:         General: Normal range of motion. Cervical back: Normal range of motion and neck supple. Right lower leg: No edema. Left lower leg: No edema. Neurological:      General: No focal deficit present. Mental Status: He is alert.    Psychiatric:         Mood and Affect: Mood normal.         Behavior: Behavior normal.

## 2023-02-10 ENCOUNTER — HOSPITAL ENCOUNTER (OUTPATIENT)
Dept: PHYSICAL THERAPY | Age: 78
Setting detail: THERAPIES SERIES
Discharge: HOME | End: 2023-02-10
Payer: COMMERCIAL

## 2023-02-10 PROCEDURE — 97162 PT EVAL MOD COMPLEX 30 MIN: CPT

## 2023-02-10 ASSESSMENT — PAIN DESCRIPTION - LOCATION: LOCATION: BACK

## 2023-02-10 ASSESSMENT — PAIN DESCRIPTION - PAIN TYPE: TYPE: CHRONIC PAIN

## 2023-02-10 ASSESSMENT — PAIN DESCRIPTION - ORIENTATION: ORIENTATION: LOWER

## 2023-02-10 ASSESSMENT — PAIN DESCRIPTION - DESCRIPTORS: DESCRIPTORS: SORE

## 2023-02-10 ASSESSMENT — PAIN SCALES - GENERAL: PAINLEVEL_OUTOF10: 8

## 2023-02-10 NOTE — PLAN OF CARE
Phillip garcia, [address]     Ph: [phone]  Fax: [phone]    [x] Certification  [] Recertification [x]  Plan of Care  [] Progress Note [] Discharge      Referring Provider: Mayte Gutierres NP    From:  Alejandra Bustillo, PT     Patient: Riya Glass (1 y.o. male) : 1945 Date: 02/10/2023   Medical Diagnosis: Low back pain, unspecified [M54.50]    Treatment Diagnosis: low back pain, impaired gait, impaired balance, decreased LE strength, impaired ROM      Progress Report Period from: 2/10/2023  to 2/10/2023    Visits to Date: 1 No Show: 0 Cancelled Appts: 0    OBJECTIVE:   Short Term Goals - Time Frame for Short Term Goals: 4 weeks    Goals Current/Discharge status  Status   Short Term Goal 1: Patient will be independent with bed mobility. Bed mobility  Supine to Sit: Moderate assistance  Sit to Supine: Minimal assistance   New   Short Term Goal 2: Patient will report </= 5/10 pain in low back with functional mobility. Patient reports 8/10 pain in low back   New   Short Term Goal 3: Patient will be independent with HEP. Patient needs HEP. New     Long Term Goals - Time Frame for Long Term Goals : 6 weeks  Goals Current/ Discharge status Status   Long Term Goal 1: Patient will increase lumbar ROM >/= 75% WFLs for improved functional mobility. Spine  Lumbar: flexion 25% WFLs, rotation with pain, limited SB due to pain (tested in seated position)      New   Long Term Goal 2: Patient will increase strength in bilateral LEs >/= 4+/5 for improved standing and ambulation tolerance.   Strength RLE  R Hip Flexion: 3-/5  R Hip ABduction: 3-/5  R Knee Flexion: 4-/5  R Knee Extension: 4/5  R Ankle Dorsiflexion: 4+/5  Strength LLE  L Hip Flexion: 3-/5  L Hip ABduction: 3-/5  L Knee Flexion: 4-/5  L Knee Extension: 4/5  L Ankle Dorsiflexion: 4+/5      New   Long Term Goal 3: Ua balance >/= 45/56 to demonstrate improved balance. Outcomes Measures:  Au Balance Score: 20    New   Long Term Goal 4: Patient will ambulate with LRD for 150ft with improved bilateral step length and symmetry. Transfers  Sit to Stand: Modified independent  Stand to Sit: Modified independent  Ambulation  Surface: Level tile  Device: Forearm Crutches (one crutch)  Assistance: Supervision  Quality of Gait: unsteady  Gait Deviations: Slow Verónica, Decreased step length, Decreased step height  Distance: clinical distance in department          New     Body Structures, Functions, Activity Limitations Requiring Skilled Therapeutic Intervention: Decreased functional mobility , Decreased ROM, Decreased strength, Decreased endurance, Decreased balance, Increased pain, Decreased posture  Assessment: Patient reports chronic low back pain making mobility more difficulty and reports multiple falls at home. Upon PT evaluation, patient demonstrates decreased LE/core strength with impaired lumbar ROM. Additionally, patient demonstrates impaired gait and unsteadiness. Further PT recommended to improve balance, strength, and decreased pain. Therapy Prognosis: Good      PT Education: Goals;PT Role;Plan of Care;Home Exercise Program    PLAN: [x] Evaluate and Treat  Frequency/Duration:  Plan Frequency: 2  Plan weeks: 6  Current Treatment Recommendations: Strengthening, ROM, Balance training, Functional mobility training, Transfer training, Endurance training, Gait training, Stair training, Neuromuscular re-education, Manual, Pain management, Home exercise program, Safety education & training, Patient/Caregiver education & training, Equipment evaluation, education, & procurement, Modalities  Modalities: Heat/Cold, E-stim - unattended     Precautions:                            Patient Status:[x] Continue/ Initiate plan of Care     [] Discharge PT. Recommend pt continue with HEP.       [] Additional visits requested, Please re-certify for additional visits:     [] Hold     Signature:  Electronically signed by Avery Saxena PT on 2/10/23 at 3:55 PM EST          If you have any questions or concerns, please don't hesitate to call. Thank you for your referral.    I have reviewed this plan of care and certify a need for medically necessary rehabilitation services.     Physician Signature:__________________________________________________________  Date:  Please sign and return

## 2023-02-20 ENCOUNTER — APPOINTMENT (OUTPATIENT)
Dept: PHYSICAL THERAPY | Age: 78
End: 2023-02-20
Payer: COMMERCIAL

## 2023-02-20 PROCEDURE — 97110 THERAPEUTIC EXERCISES: CPT

## 2023-02-20 PROCEDURE — 97140 MANUAL THERAPY 1/> REGIONS: CPT

## 2023-02-20 ASSESSMENT — PAIN SCALES - GENERAL: PAINLEVEL_OUTOF10: 8

## 2023-02-20 ASSESSMENT — PAIN DESCRIPTION - LOCATION: LOCATION: BACK

## 2023-02-20 ASSESSMENT — PAIN DESCRIPTION - PAIN TYPE: TYPE: CHRONIC PAIN

## 2023-02-20 NOTE — PROGRESS NOTES
[facility]  Outpatient Physical Therapy    Treatment Note        Date: 2023  Patient: Barbara Garay  : 1945   Confirmed: Yes  MRN: [id number]  Referring Provider: Fanny Gary NP    Medical Diagnosis: Low back pain, unspecified [M54.50]       Treatment Diagnosis: low back pain, impaired gait, impaired balance, decreased LE strength, impaired ROM    Visit Information:  Insurance: Payor: Milton Jacobs / Plan: radha  / Product Type: *No Product type* /   PT Visit Information  PT Insurance Information: Gregorio Solorzano  Total # of Visits to Date: 2  No Show: 0  Progress Note Due Date: 03/10/23  Canceled Appointment: 0  Progress Note Counter:     Subjective Information:  Subjective: Pt reports he couldnt sleep last night. Pt reports \"i fall down at home but I have a rug. \" Reports \"I am an old man in bed what is he going to do\"- when responding about  HEP. HEP Compliance:  [] Good [] Fair [x] Poor [] Reports not doing due to:    Pain Screening  Patient Currently in Pain: Yes  Pain Assessment: 0-10  Pain Level: 8  Pain Type: Chronic pain  Pain Location: Back    Treatment:  Exercises:  Exercises  Exercise 1: **try manual each visit on back per PT orders**- tennis ball massage to lower back x10 mins  Exercise 2: SciFit L1.0 x 6mins  Exercise 4: supine on wedge TA isometric- could not tolerate  Exercise 7: bridge- able to complete 3-4 reps before declining to finish  Exercise 10: gait drills- patient declined    Assessment: Body Structures, Functions, Activity Limitations Requiring Skilled Therapeutic Intervention: Decreased functional mobility , Decreased ROM, Decreased strength, Decreased endurance, Decreased balance, Increased pain, Decreased posture  Assessment: Patient reports he is not comfortable performing balance, gait, and strength exercises. Patient feels he gets most benefit from riding recumbent bike and soft tissue massages.  Patient declines other therapy interventions. Reports he would only like to focus on seated exercises, bike, and soft tissue massages. Therapist educated patient on goals written by supervising PT and exercises to achieve those goals. Pt reports he is only doing things he is comfortable with at therapy. Patient will continue to benefit from skilled physical therapy to continue to progress towards goals per plan of care. Treatment Diagnosis: low back pain, impaired gait, impaired balance, decreased LE strength, impaired ROM  Therapy Prognosis: Good    Post-Pain Assessment:       Pain Rating (0-10 pain scale):   not rated /10   Location and pain description same as pre-treatment unless indicated. Action: [] NA   [] Perform HEP  [] Meds as prescribed  [] Modalities as prescribed   [] Call Physician     GOALS   Patient Goal(s): Patient Goals : decrease pain, move better    Short Term Goals Completed by 4 weeks Goal Status   STG 1 Patient will be independent with bed mobility. In progress   STG 2 Patient will report </= 5/10 pain in low back with functional mobility. In progress   STG 3 Patient will be independent with HEP. In progress     Long Term Goals Completed by 6 weeks Goal Status   LTG 1 Patient will increase lumbar ROM >/= 75% WFLs for improved functional mobility. In progress   LTG 2 Patient will increase strength in bilateral LEs >/= 4+/5 for improved standing and ambulation tolerance. In progress   LTG 3 Au balance >/= 45/56 to demonstrate improved balance. In progress   LTG 4 Patient will ambulate with LRD for 150ft with improved bilateral step length and symmetry.  In progress          Plan:  Frequency/Duration:  Plan  Plan Frequency: 2  Plan weeks: 6  Current Treatment Recommendations: Strengthening, ROM, Balance training, Functional mobility training, Transfer training, Endurance training, Gait training, Stair training, Neuromuscular re-education, Manual, Pain management, Home exercise program, Safety education & training, Patient/Caregiver education & training, Equipment evaluation, education, & procurement, Modalities  Modalities: Heat/Cold, E-stim - unattended  Pt to continue current HEP. See objective section for any therapeutic exercise changes, additions or modifications this date.     Therapy Time:   PT Individual Minutes  Time In: 1300  Time Out: 1400  Minutes: 60  Timed Code Treatment Minutes: 40 Minutes    Procedure Minutes:0  Manual: STM 10   Timed Activity Minutes Units   Ther Ex 30 2   Manual  10 1     Electronically signed by Jayy Yeboah PTA on 2/20/23 at 2:15 PM EST

## 2023-02-21 DIAGNOSIS — E11.69 TYPE 2 DIABETES MELLITUS WITH OTHER SPECIFIED COMPLICATION, WITH LONG-TERM CURRENT USE OF INSULIN (HCC): ICD-10-CM

## 2023-02-21 DIAGNOSIS — Z79.4 TYPE 2 DIABETES MELLITUS WITH OTHER SPECIFIED COMPLICATION, WITH LONG-TERM CURRENT USE OF INSULIN (HCC): ICD-10-CM

## 2023-02-21 LAB
ALBUMIN SERPL-MCNC: 4.3 G/DL (ref 3.5–4.6)
ALP BLD-CCNC: 117 U/L (ref 35–104)
ALT SERPL-CCNC: 9 U/L (ref 0–41)
ANION GAP SERPL CALCULATED.3IONS-SCNC: 10 MEQ/L (ref 9–15)
AST SERPL-CCNC: 10 U/L (ref 0–40)
BILIRUB SERPL-MCNC: 0.3 MG/DL (ref 0.2–0.7)
BUN BLDV-MCNC: 35 MG/DL (ref 8–23)
CALCIUM SERPL-MCNC: 8.9 MG/DL (ref 8.5–9.9)
CHLORIDE BLD-SCNC: 107 MEQ/L (ref 95–107)
CO2: 24 MEQ/L (ref 20–31)
CREAT SERPL-MCNC: 1.8 MG/DL (ref 0.7–1.2)
GFR SERPL CREATININE-BSD FRML MDRD: 37.7 ML/MIN/{1.73_M2}
GLOBULIN: 2.5 G/DL (ref 2.3–3.5)
GLUCOSE BLD-MCNC: 104 MG/DL (ref 70–99)
HBA1C MFR BLD: 6.9 % (ref 4.8–5.9)
POTASSIUM SERPL-SCNC: 5.3 MEQ/L (ref 3.4–4.9)
SODIUM BLD-SCNC: 141 MEQ/L (ref 135–144)
TOTAL PROTEIN: 6.8 G/DL (ref 6.3–8)

## 2023-02-23 ENCOUNTER — APPOINTMENT (OUTPATIENT)
Dept: PHYSICAL THERAPY | Age: 78
End: 2023-02-23
Payer: COMMERCIAL

## 2023-02-23 PROCEDURE — 97110 THERAPEUTIC EXERCISES: CPT

## 2023-02-23 ASSESSMENT — PAIN DESCRIPTION - PAIN TYPE: TYPE: CHRONIC PAIN

## 2023-02-23 ASSESSMENT — PAIN DESCRIPTION - ORIENTATION: ORIENTATION: LOWER

## 2023-02-23 ASSESSMENT — PAIN DESCRIPTION - LOCATION: LOCATION: BACK

## 2023-02-23 NOTE — PROGRESS NOTES
[facility]  Outpatient Physical Therapy    Treatment Note        Date: 2023  Patient: Carson Torres  : 1945   Confirmed: Yes  MRN: [id number]  Referring Provider: Carina Alcaraz NP    Medical Diagnosis: Low back pain, unspecified [M54.50]       Treatment Diagnosis: low back pain, impaired gait, impaired balance, decreased LE strength, impaired ROM    Visit Information:  Insurance: Payor: Florentin Sea / Plan: Reed Root / Product Type: *No Product type* /   PT Visit Information  PT Insurance Information: Keshia Reeves  Total # of Visits to Date: 3  No Show: 0  Progress Note Due Date: 03/10/23  Canceled Appointment: 0  Progress Note Counter:     Subjective Information:  Subjective: Pt reports he is returning to the doctor this saturday to get clarification on his therapy plan. States \"I know what is wrong with me and I pay insurance, I should be able to choose what I do. \" [id number] utilized  HEP Compliance:  [] Good [] Fair [] Poor [x] Reports not doing due to:    Pain Screening  Patient Currently in Pain: Yes  Pain Level:  (pt unable to rate pain - reports he always hurts)  Pain Type: Chronic pain  Pain Location: Back  Pain Orientation: Lower    Treatment:  Exercises:  Exercises  Exercise 2: SciFit L1.0 x 12mins      Modalities:  Moist Heat (CPT 74500)  Patient Position: Seated  Number Minutes Moist Heat: 10  Moist heat location: Low back  Post treatment skin assessment: Intact  Untimed (minutes): 10       *Indicates exercise, modality, or manual techniques to be initiated when appropriate    Objective Measures:        Strength: [x] NT  [] MMT completed:      ROM: [x] NT  [] ROM measurements:      Assessment:    Body Structures, Functions, Activity Limitations Requiring Skilled Therapeutic Intervention: Decreased functional mobility , Decreased ROM, Decreased strength, Decreased endurance, Decreased balance, Increased pain, Decreased posture  Assessment: This PTA attempting to explain role of PT to pt this session and addressing concerns of pt's POC. Increased time required for educating and encouraging pt to trial PT's POC. Pt non receptive to this PTA's explanations despite utilizing . Pt continues to request to perform massage and bike while coming to PT and reports this is what his doctor recommends. Education continued to be provided. Pt utilizing sci fit this session to continue to improve strength and ROM for progression toward goals. Concluded session with heat pack for pain relief and relaxation as well as education on current POC with PT. Pt pleased with this PTA's attempts to educate but does state he will get clarification from his doctor this weekend. Treatment Diagnosis: low back pain, impaired gait, impaired balance, decreased LE strength, impaired ROM  Therapy Prognosis: Good  PT Education: Goals, PT Role  Patient Education: Pt educated this session on PT goals and interventions to achieve goals. Explained to pt importance of performing therapeutic exercise with massage and bike to achieve goals and address low back pain. Pt non receptive to follwing PT's POC and continues to request to perform exercises he would like to perform that he feels help him. Multiple attempts made to educate pt on role of therapy as well as guidelines for PT performing skilled therapy for insurance coverage. Post-Pain Assessment:       Pain Rating (0-10 pain scale):   /10   Location and pain description same as pre-treatment unless indicated. Action: [x] NA   [] Perform HEP  [] Meds as prescribed  [] Modalities as prescribed   [] Call Physician     GOALS   Patient Goal(s): Patient Goals : decrease pain, move better    Short Term Goals Completed by 4 weeks Goal Status   STG 1 Patient will be independent with bed mobility. In progress   STG 2 Patient will report </= 5/10 pain in low back with functional mobility.  In progress   STG 3 Patient will be independent with HEP. In progress     Long Term Goals Completed by 6 weeks Goal Status   LTG 1 Patient will increase lumbar ROM >/= 75% WFLs for improved functional mobility. In progress   LTG 2 Patient will increase strength in bilateral LEs >/= 4+/5 for improved standing and ambulation tolerance. In progress   LTG 3 Au balance >/= 45/56 to demonstrate improved balance. In progress   LTG 4 Patient will ambulate with LRD for 150ft with improved bilateral step length and symmetry. In progress       Plan:  Frequency/Duration:  Plan  Plan Frequency: 2  Plan weeks: 6  Current Treatment Recommendations: Strengthening, ROM, Balance training, Functional mobility training, Transfer training, Endurance training, Gait training, Stair training, Neuromuscular re-education, Manual, Pain management, Home exercise program, Safety education & training, Patient/Caregiver education & training, Equipment evaluation, education, & procurement, Modalities  Modalities: Heat/Cold, E-stim - unattended  Pt to continue current HEP. See objective section for any therapeutic exercise changes, additions or modifications this date.     Therapy Time:      PT Individual Minutes  Time In: 1300  Time Out: 0836  Minutes: 59  Timed Code Treatment Minutes: 12 Minutes  Procedure Minutes:10 MHP  Timed Activity Minutes Units   Ther Ex 12 1     Electronically signed by Analisa Cortez PTA on 2/23/23 at 12:57 PM EST

## 2023-03-02 ENCOUNTER — HOSPITAL ENCOUNTER (OUTPATIENT)
Dept: PHYSICAL THERAPY | Age: 78
Setting detail: THERAPIES SERIES
Discharge: HOME | End: 2023-03-02
Payer: COMMERCIAL

## 2023-03-02 PROCEDURE — 97110 THERAPEUTIC EXERCISES: CPT

## 2023-03-02 ASSESSMENT — PAIN DESCRIPTION - PAIN TYPE: TYPE: CHRONIC PAIN

## 2023-03-02 ASSESSMENT — PAIN SCALES - GENERAL: PAINLEVEL_OUTOF10: 8

## 2023-03-02 ASSESSMENT — PAIN DESCRIPTION - LOCATION: LOCATION: BACK;LEG

## 2023-03-02 NOTE — PROGRESS NOTES
[facility]  Outpatient Physical Therapy   Treatment Note        Date: 3/2/2023  Patient: Veronica Murphy  : 1945   Confirmed: Yes  MRN: [id number]  Referring Provider: Rosalinda Zaragoza NP      Medical Diagnosis: Low back pain, unspecified [M54.50]      Treatment Diagnosis: low back pain, impaired gait, impaired balance, decreased LE strength, impaired ROM    Visit Information:  Insurance: Payor: Winston Michel / Plan: Makenzie Boucher / Product Type: *No Product type* /   PT Visit Information  PT Insurance Information: Catherine Cheryle  Total # of Visits to Date: 4  No Show: 0  Progress Note Due Date: 03/10/23  Canceled Appointment: 0  Progress Note Counter: 3/12    Subjective Information:  Subjective: : Barbara Ro - [id number] ; patient reports he is doing ok, states his back is regular. unable to determine when he is going back to his PCP. Reports pain and numbness in his legs. HEP Compliance:  [] Good [] Fair [] Poor [x] Reports not doing due to: no HEP given the previous due to patient declining exercises    Pain Screening  Patient Currently in Pain: Yes  Pain Assessment: 0-10  Pain Level: 8  Pain Type: Chronic pain  Pain Location: Back, Leg    Treatment:  Exercises:  Exercises  Exercise 2: SciFit L1.5 x5 min  Exercise 3: hamstring str on 10\" stool 5 reps x 20\" holds, benedict  Exercise 12:  LAQ 15 reps x 5\" holds  Exercise 13: seated hip adduction w/ red ball 20 reps x 5\" holds  Exercise 14: seated hip march 2 sets x 10 reps x YTB  Exercise 20: HEP: hip march, hip add, LAQ, hamstring str     *Indicates exercise, modality, or manual techniques to be initiated when appropriate    Objective Measures:     STG 2 Current Status[de-identified] 3/2: reports 9/10 pain  STG 3 Current Status[de-identified] 3/2: tried to administer HEP this date, patient declined pictures     LTG 4 Current Status[de-identified] 3/2: ambulation with single forearm crutch, fwd flexed trunk, slow heydi, small shuffling steps     Assessment: Body Structures, Functions, Activity Limitations Requiring Skilled Therapeutic Intervention: Decreased functional mobility , Decreased ROM, Decreased strength, Decreased endurance, Decreased balance, Increased pain, Decreased posture  Assessment: Significant education given to patient regarding therapy and PT POC, discussing with patient about coming to therapy and if he wants to continue we need to work on strength, balance and mobility and not just utilize the bike and massage and also explained the difference between physical therapy and massage therapy. Stated if he only wants to get massages, we cannot continue with therapy as it is not skilled and what we practice, suggested to patient about going to massage [facility] or similar place. Patient reported understanding and state he is ok with coming here, however unsure if patient comprehends and/or understands what physical therapy is utilized for. Initiated some light strengthening in sitting and patient was able to tolerate this date, however demonstrated decreased mobility and strength, utilzing hands to help lift legs. Skilled therapy is indicated due to poor stability and mobility that patient presents with. Treatment Diagnosis: low back pain, impaired gait, impaired balance, decreased LE strength, impaired ROM  Therapy Prognosis: Good  PT Education: PT Role, Plan of Care, Home Exercise Program  Patient Education: educated patient on difference between physical therapist and massage therapist     Post-Pain Assessment:       Pain Rating (0-10 pain scale):   not reported/10   Location and pain description same as pre-treatment unless indicated. Action: [] NA   [x] Perform HEP  [] Meds as prescribed  [] Modalities as prescribed   [] Call Physician     GOALS   Patient Goal(s): Patient Goals : decrease pain, move better    Short Term Goals Completed by 4 weeks Goal Status   STG 1 Patient will be independent with bed mobility.  In progress   STG 2 Patient will report </= 5/10 pain in low back with functional mobility. In progress   STG 3 Patient will be independent with HEP. In progress     Long Term Goals Completed by 6 weeks Goal Status   LTG 1 Patient will increase lumbar ROM >/= 75% WFLs for improved functional mobility. In progress   LTG 2 Patient will increase strength in bilateral LEs >/= 4+/5 for improved standing and ambulation tolerance. In progress   LTG 3 Au balance >/= 45/56 to demonstrate improved balance. In progress   LTG 4 Patient will ambulate with LRD for 150ft with improved bilateral step length and symmetry. In progress          Plan:  Frequency/Duration:  Plan  Plan Frequency: 2  Plan weeks: 6  Current Treatment Recommendations: Strengthening, ROM, Balance training, Functional mobility training, Transfer training, Endurance training, Gait training, Stair training, Neuromuscular re-education, Manual, Pain management, Home exercise program, Safety education & training, Patient/Caregiver education & training, Equipment evaluation, education, & procurement, Modalities  Modalities: Heat/Cold, E-stim - unattended  Pt to continue current HEP. See objective section for any therapeutic exercise changes, additions or modifications this date.     Therapy Time:      PT Individual Minutes  Time In: 1310  Time Out: 2827  Minutes: 38  Timed Code Treatment Minutes: 25 Minutes  Procedure Minutes:  Timed Activity Minutes Units   Ther Ex 25 2   Electronically signed by Cj Murphy PT on 3/2/23 at 2:20 PM EST

## 2023-03-07 ENCOUNTER — HOSPITAL ENCOUNTER (OUTPATIENT)
Dept: PHYSICAL THERAPY | Age: 78
Setting detail: THERAPIES SERIES
Discharge: HOME | End: 2023-03-07
Payer: COMMERCIAL

## 2023-03-07 PROCEDURE — 97110 THERAPEUTIC EXERCISES: CPT

## 2023-03-07 PROCEDURE — 97140 MANUAL THERAPY 1/> REGIONS: CPT

## 2023-03-07 PROCEDURE — 97112 NEUROMUSCULAR REEDUCATION: CPT

## 2023-03-07 ASSESSMENT — PAIN DESCRIPTION - PAIN TYPE: TYPE: CHRONIC PAIN

## 2023-03-07 ASSESSMENT — PAIN DESCRIPTION - DESCRIPTORS: DESCRIPTORS: SORE

## 2023-03-07 ASSESSMENT — PAIN DESCRIPTION - LOCATION: LOCATION: BACK;LEG

## 2023-03-07 ASSESSMENT — PAIN SCALES - GENERAL: PAINLEVEL_OUTOF10: 10

## 2023-03-07 ASSESSMENT — PAIN DESCRIPTION - ORIENTATION: ORIENTATION: LOWER

## 2023-03-07 NOTE — PROGRESS NOTES
[facility]  Outpatient Physical Therapy   Treatment Note        Date: 3/7/2023  Patient: Sapna Constantino  : 1945   Confirmed: Yes  MRN: [id number]  Referring Provider: Bernice Scruggs NP      Medical Diagnosis: Low back pain, unspecified [M54.50]      Treatment Diagnosis: low back pain, impaired gait, impaired balance, decreased LE strength, impaired ROM    Visit Information:  Insurance: Payor: Kaya Estrada / Plan: Delfina Redman / Product Type: *No Product type* /   PT Visit Information  PT Insurance Information: Beverley Partida  Total # of Visits to Date: 5  No Show: 0  Progress Note Due Date: 03/10/23  Canceled Appointment: 0  Progress Note Counter:     Subjective Information:  Subjective: Patient reports increased pain this date due to the weather  HEP Compliance:  [x] Good [] Fair [] Poor [] Reports not doing due to:    Pain Screening  Patient Currently in Pain: Yes  Pain Assessment: 0-10  Pain Level: 10  Pain Type: Chronic pain  Pain Location: Back, Leg  Pain Orientation: Lower  Pain Descriptors: Sore    Treatment:  Exercises:  Exercises  Exercise 2: SciFit L2.0x 12 min  Exercise 3: hamstring str on 4'' step 3 reps x 20\" holds, benedict; therapist manual stretched hamstrings  Exercise 6: LTR x 10  Exercise 9: static standing balance: FA, FT, semi tandem, marching with UE assist  Exercise 20: HEP: continue with current       Manual:   Manual Therapy  Soft Tissue Mobilizaton: STM left lumbar paraspinals near PSIS (pt laying on right side) x 8 minutes       Modalities:  Moist Heat (CPT 59982)  Patient Position: Seated  Number Minutes Moist Heat: 10  Moist heat location: Low back  Post treatment skin assessment: Intact  Untimed (minutes): 10       *Indicates exercise, modality, or manual techniques to be initiated when appropriate    Objective Measures:         LTG 4 Current Status[de-identified] 3/7: ambulation with single forearm crutch, antalgic gait pattern, slow heydi Assessment: Body Structures, Functions, Activity Limitations Requiring Skilled Therapeutic Intervention: Decreased functional mobility , Decreased ROM, Decreased strength, Decreased endurance, Decreased balance, Increased pain, Decreased posture  Assessment: Patient reports decreased pain post treatment session this date. Continued to work on LE strengthening and flexibility. Patient reports some relief with gentle STM on left lower lumbar paraspinals. Patient would continue to benefit from PT to improve strength, balance, and decrease pain. Treatment Diagnosis: low back pain, impaired gait, impaired balance, decreased LE strength, impaired ROM     PT Education: Goals, PT Role, Plan of Care, Home Exercise Program       Post-Pain Assessment:       Pain Rating (0-10 pain scale):   5/10   Location and pain description same as pre-treatment unless indicated. Action: [] NA   [] Perform HEP  [x] Meds as prescribed  [x] Modalities as prescribed   [] Call Physician     GOALS   Patient Goal(s): Patient Goals : decrease pain, move better    Short Term Goals Completed by 4 weeks Goal Status   STG 1 Patient will be independent with bed mobility. In progress   STG 2 Patient will report </= 5/10 pain in low back with functional mobility. In progress   STG 3 Patient will be independent with HEP. In progress     Long Term Goals Completed by 6 weeks Goal Status   LTG 1 Patient will increase lumbar ROM >/= 75% WFLs for improved functional mobility. In progress   LTG 2 Patient will increase strength in bilateral LEs >/= 4+/5 for improved standing and ambulation tolerance. In progress   LTG 3 Au balance >/= 45/56 to demonstrate improved balance. In progress   LTG 4 Patient will ambulate with LRD for 150ft with improved bilateral step length and symmetry.  In progress       Plan:  Frequency/Duration:  Plan  Plan Frequency: 2  Plan weeks: 6  Current Treatment Recommendations: Strengthening, ROM, Balance training, Functional mobility training, Transfer training, Endurance training, Gait training, Stair training, Neuromuscular re-education, Manual, Pain management, Home exercise program, Safety education & training, Patient/Caregiver education & training, Equipment evaluation, education, & procurement, Modalities  Modalities: Heat/Cold, E-stim - unattended  Pt to continue current HEP. See objective section for any therapeutic exercise changes, additions or modifications this date.     Therapy Time:      PT Individual Minutes  Time In: 1300  Time Out: 1400  Minutes: 60  Timed Code Treatment Minutes: 50 Minutes  Procedure Minutes:10 minutes hot pack  Timed Activity Minutes Units   Ther Ex 32 1   Manual  8 1   Neuro 10 1     Electronically signed by Marylene Gins, PT on 3/7/23 at 4:08 PM EST

## 2023-03-09 ENCOUNTER — HOSPITAL ENCOUNTER (OUTPATIENT)
Dept: PHYSICAL THERAPY | Age: 78
Setting detail: THERAPIES SERIES
Discharge: HOME | End: 2023-03-09
Payer: COMMERCIAL

## 2023-03-09 PROCEDURE — 97110 THERAPEUTIC EXERCISES: CPT

## 2023-03-09 PROCEDURE — 97140 MANUAL THERAPY 1/> REGIONS: CPT

## 2023-03-09 PROCEDURE — 97112 NEUROMUSCULAR REEDUCATION: CPT

## 2023-03-09 ASSESSMENT — PAIN DESCRIPTION - LOCATION: LOCATION: GROIN;BACK

## 2023-03-09 ASSESSMENT — PAIN DESCRIPTION - DESCRIPTORS: DESCRIPTORS: SORE

## 2023-03-09 ASSESSMENT — PAIN SCALES - GENERAL: PAINLEVEL_OUTOF10: 5

## 2023-03-09 ASSESSMENT — PAIN DESCRIPTION - PAIN TYPE: TYPE: CHRONIC PAIN

## 2023-03-09 ASSESSMENT — PAIN DESCRIPTION - ORIENTATION: ORIENTATION: LOWER;RIGHT;LEFT

## 2023-03-09 NOTE — PROGRESS NOTES
[facility]  Outpatient Physical Therapy    Treatment Note        Date: 3/9/2023  Patient: Izabel Perez  : 1945   Confirmed: Yes  MRN: [id number]  Referring Provider: Marian Padilla NP    Medical Diagnosis: Low back pain, unspecified [M54.50]       Treatment Diagnosis: low back pain, impaired gait, impaired balance, decreased LE strength, impaired ROM    Visit Information:  Insurance: Payor: Brooke Gonzalez / Plan: [employer] / Product Type: *No Product type* /   PT Visit Information  PT Insurance Information: [place] [employer]  Total # of Visits to Date: 6  No Show: 0  Progress Note Due Date: 03/10/23  Canceled Appointment: 0  Progress Note Counter:     Subjective Information:  Subjective: Patient reports he takes Gapentin and Voltaren for pain  HEP Compliance:  [x] Good [x] Fair [] Poor [] Reports not doing due to:    Pain Screening  Patient Currently in Pain: Yes  Pain Assessment: 0-10  Pain Level: 5  Pain Type: Chronic pain  Pain Location: Groin, Back  Pain Orientation: Lower, Right, Left  Pain Descriptors: Sore    Treatment:  Exercises:  Exercises  Exercise 2: SciFit L2.0x 12 min  Exercise 3: hamstring str on 6'' step 3 reps x 20\" holds, benedict; therapist manual stretched hamstrings  Exercise 9: static standing balance: FA, FT, semi tandem, marching with UE assist  Exercise 10: gait drills- F/l/R/ March 0-2  Exercise 15: Gait drills:       Manual:   Manual Therapy  Soft Tissue Mobilizaton: STM left lumbar paraspinals near PSIS (pt laying on right side) x 8 minutes       Modalities:  Moist Heat (CPT 54702)  Patient Position: Seated  Number Minutes Moist Heat: 10  Moist heat location: Low back  Post treatment skin assessment: Intact  Untimed (minutes): 10       *Indicates exercise, modality, or manual techniques to be initiated when appropriate           Assessment:    Body Structures, Functions, Activity Limitations Requiring Skilled Therapeutic Intervention: Decreased functional mobility , Decreased ROM, Decreased strength, Decreased endurance, Decreased balance, Increased pain, Decreased posture  Assessment: Patient reports decreased pain post treatment session this date. Continued to work on LE strengthening and flexibility. Progressed session with focus on balance activites with good tolerance. Pt occassionally challened in NBOS and demo'd slight instability in SLS. Cont with POC as tolerated. Treatment Diagnosis: low back pain, impaired gait, impaired balance, decreased LE strength, impaired ROM     Post-Pain Assessment:       Pain Rating (0-10 pain scale):   3/10   Location and pain description same as pre-treatment unless indicated. Action: [] NA   [] Perform HEP  [x] Meds as prescribed  [] Modalities as prescribed   [] Call Physician     GOALS   Patient Goal(s): Patient Goals : decrease pain, move better    Short Term Goals Completed by 4 weeks Goal Status   STG 1 Patient will be independent with bed mobility. In progress   STG 2 Patient will report </= 5/10 pain in low back with functional mobility. In progress   STG 3 Patient will be independent with HEP. In progress       Long Term Goals Completed by 6 weeks Goal Status   LTG 1 Patient will increase lumbar ROM >/= 75% WFLs for improved functional mobility. In progress   LTG 2 Patient will increase strength in bilateral LEs >/= 4+/5 for improved standing and ambulation tolerance. In progress   LTG 3 Au balance >/= 45/56 to demonstrate improved balance. In progress   LTG 4 Patient will ambulate with LRD for 150ft with improved bilateral step length and symmetry.  In progress       Plan:  Frequency/Duration:  Plan  Plan Frequency: 2  Plan weeks: 6  Current Treatment Recommendations: Strengthening, ROM, Balance training, Functional mobility training, Transfer training, Endurance training, Gait training, Stair training, Neuromuscular re-education, Manual, Pain management, Home exercise program, Safety education & training, Patient/Caregiver education & training, Equipment evaluation, education, & procurement, Modalities  Modalities: Heat/Cold, E-stim - unattended  Pt to continue current HEP. See objective section for any therapeutic exercise changes, additions or modifications this date.     Therapy Time:      PT Individual Minutes  Time In: 1369  Time Out: 0053  Minutes: 57  Timed Code Treatment Minutes: 47 Minutes  Procedure Minutes:10, MH  Timed Activity Minutes Units   Ther Ex 15 1   Manual  8 1   Neuro 24 1     Electronically signed by Cordelia Granados PTA on 3/9/23 at 6:26 PM EST

## 2023-03-14 ENCOUNTER — APPOINTMENT (OUTPATIENT)
Dept: PHYSICAL THERAPY | Age: 78
End: 2023-03-14
Payer: COMMERCIAL

## 2023-03-14 PROCEDURE — 97140 MANUAL THERAPY 1/> REGIONS: CPT

## 2023-03-14 PROCEDURE — 97112 NEUROMUSCULAR REEDUCATION: CPT

## 2023-03-14 PROCEDURE — 97110 THERAPEUTIC EXERCISES: CPT

## 2023-03-14 ASSESSMENT — PAIN SCALES - GENERAL: PAINLEVEL_OUTOF10: 6

## 2023-03-14 NOTE — PROGRESS NOTES
[facility]  Outpatient Physical Therapy    Treatment Note        Date: 3/14/2023  Patient: Jarrod Cherry  : 1945   Confirmed: Yes  MRN: [id number]  Referring Provider: Eyal Zavala NP    Medical Diagnosis: Low back pain, unspecified [M54.50]       Treatment Diagnosis: low back pain, impaired gait, impaired balance, decreased LE strength, impaired ROM    Visit Information:  Insurance: Payor: Aakash Burnham / Plan: Yovani Hill / Product Type: *No Product type* /   PT Visit Information  PT Insurance Information: Rebecca Loss  Total # of Visits to Date: 7  No Show: 0  Progress Note Due Date: 03/10/23  Canceled Appointment: 0  Progress Note Counter:     Subjective Information:  Subjective: Patient reports he takes medication for sleep and takes his blood pressure and sugar everday and it is good. HEP Compliance:  [x] Good [] Fair [] Poor [] Reports not doing due to:    Pain Screening  Patient Currently in Pain: Yes  Pain Assessment: 0-10  Pain Level: 6    Treatment:  Exercises:  Exercises  Exercise 2: SciFit L2.0x 15 min  Exercise 3: hamstring str on 6'' step 3 reps x 20\" hold  Exercise 9: static standing balance: FA, FT, semi tandem, marching with UE assist  Exercise 10: gait drills- F/l/R/ March 0-2     Manual:   Manual Therapy  Soft Tissue Mobilizaton: STM left lumbar paraspinals near PSIS (pt laying on right side) x 8 minutes     Modalities:  Moist Heat (CPT 13223)  Patient Position: Seated  Number Minutes Moist Heat: 10  Moist heat location: Low back  Post treatment skin assessment: Intact  Untimed (minutes): 10     Assessment: Body Structures, Functions, Activity Limitations Requiring Skilled Therapeutic Intervention: Decreased functional mobility , Decreased ROM, Decreased strength, Decreased endurance, Decreased balance, Increased pain, Decreased posture  Assessment: Patient reports decreased pain following session.  Increased tenderness noted surrounding left PSIS during soft tissue massage. Patient tolerated gait drills without increase in overall symptoms. Concluded session with hot pack to continue decreasing lower back pain. Patient will continue to benefit from skilled physcial therapy to continue progressing towards goals per plan of care. Treatment Diagnosis: low back pain, impaired gait, impaired balance, decreased LE strength, impaired ROM  Therapy Prognosis: Good     Post-Pain Assessment:       Pain Rating (0-10 pain scale):   4/10   Location and pain description same as pre-treatment unless indicated. Action: [] NA   [x] Perform HEP  [] Meds as prescribed  [] Modalities as prescribed   [] Call Physician     GOALS   Patient Goal(s): Patient Goals : decrease pain, move better    Short Term Goals Completed by 4 weeks Goal Status   STG 1 Patient will be independent with bed mobility. In progress   STG 2 Patient will report </= 5/10 pain in low back with functional mobility. In progress   STG 3 Patient will be independent with HEP. In progress     Long Term Goals Completed by 6 weeks Goal Status   LTG 1 Patient will increase lumbar ROM >/= 75% WFLs for improved functional mobility. In progress   LTG 2 Patient will increase strength in bilateral LEs >/= 4+/5 for improved standing and ambulation tolerance. In progress   LTG 3 Au balance >/= 45/56 to demonstrate improved balance. In progress   LTG 4 Patient will ambulate with LRD for 150ft with improved bilateral step length and symmetry.  In progress          Plan:  Frequency/Duration:  Plan  Plan Frequency: 2  Plan weeks: 6  Current Treatment Recommendations: Strengthening, ROM, Balance training, Functional mobility training, Transfer training, Endurance training, Gait training, Stair training, Neuromuscular re-education, Manual, Pain management, Home exercise program, Safety education & training, Patient/Caregiver education & training, Equipment evaluation, education, & procurement, Modalities  Modalities: Heat/Cold, E-stim - unattended  Pt to continue current HEP. See objective section for any therapeutic exercise changes, additions or modifications this date.     Therapy Time:   PT Individual Minutes  Time In: 9727  Time Out: 2525  Minutes: 62  Timed Code Treatment Minutes: 50 Minutes    Procedure Minutes:10 HP  Timed Activity Minutes Units   Ther Ex 15 1   Manual  8 1   Neuro 27 2     Electronically signed by Gael Funes PTA on 3/14/23 at 1:48 PM EDT

## 2023-03-16 ENCOUNTER — APPOINTMENT (OUTPATIENT)
Dept: PHYSICAL THERAPY | Age: 78
End: 2023-03-16
Payer: COMMERCIAL

## 2023-03-16 PROCEDURE — 97116 GAIT TRAINING THERAPY: CPT

## 2023-03-16 PROCEDURE — 97110 THERAPEUTIC EXERCISES: CPT

## 2023-03-16 PROCEDURE — 97535 SELF CARE MNGMENT TRAINING: CPT

## 2023-03-16 PROCEDURE — 97112 NEUROMUSCULAR REEDUCATION: CPT

## 2023-03-16 ASSESSMENT — PAIN SCALES - GENERAL: PAINLEVEL_OUTOF10: 8

## 2023-03-16 ASSESSMENT — PAIN DESCRIPTION - ORIENTATION: ORIENTATION: RIGHT;LOWER;LEFT

## 2023-03-16 ASSESSMENT — PAIN DESCRIPTION - PAIN TYPE: TYPE: CHRONIC PAIN

## 2023-03-16 ASSESSMENT — PAIN DESCRIPTION - DESCRIPTORS: DESCRIPTORS: TIGHTNESS;SORE

## 2023-03-16 ASSESSMENT — PAIN DESCRIPTION - LOCATION: LOCATION: BACK;HIP

## 2023-03-16 NOTE — PROGRESS NOTES
[facility]  Outpatient Physical Therapy    Treatment Note        Date: 3/16/2023  Patient: Jelena Taveras  : 1945   Confirmed: Yes  MRN: [id number]  Referring Provider: Jose Mercado NP    Medical Diagnosis: Low back pain, unspecified [M54.50]       Treatment Diagnosis: low back pain, impaired gait, impaired balance, decreased LE strength, impaired ROM    Visit Information:  Insurance: Payor: Opal Messing / Plan: Shiva Shoe / Product Type: *No Product type* /   PT Visit Information  PT Insurance Information: Cristina Gallagher  Total # of Visits to Date: 8  No Show: 0  Progress Note Due Date: 03/10/23  Canceled Appointment: 0  Progress Note Counter:     Subjective Information:  Subjective: Patient reports his pain is reduced after massage, Ice also helps  HEP Compliance:  [x] Good [] Fair [] Poor [] Reports not doing due to:    Pain Screening  Patient Currently in Pain: Yes  Pain Assessment: 0-10  Pain Level: 8  Pain Type: Chronic pain  Pain Location: Back, Hip  Pain Orientation: Right, Lower, Left  Pain Descriptors: Tightness, Sore    Treatment:  Exercises:  Exercises  Exercise 2: SciFit L2.5 x 10 min  Exercise 3: obj measures     Modalities:  Moist Heat (CPT 19091)  Patient Position: Seated  Number Minutes Moist Heat: 10  Moist heat location: Low back  Post treatment skin assessment: Intact  Untimed (minutes): 10       *Indicates exercise, modality, or manual techniques to be initiated when appropriate    Objective Measures:        Bed mobility  Rolling to Left: Stand by assistance  Rolling to Right: Stand by assistance  Supine to Sit: Stand by assistance  Sit to Supine: Stand by assistance         Ambulation  Surface: Carpet  Device: Rollator (one crutch)  Assistance: Supervision  Quality of Gait: slow steady pace, decreased foot clearance, improved stability  Gait Deviations: Slow [place], Decreased step length, Decreased step height  Distance: 100ft Strength: [] NT  [x] MMT completed:  Strength RLE  R Hip Flexion: 3+/5  R Hip ABduction: 4-/5  R Knee Flexion: 4-/5  R Knee Extension: 4/5  R Ankle Dorsiflexion: 4+/5, 5/5  Strength LLE  L Hip Flexion: 3+/5  L Hip ABduction: 3+/5  L Knee Flexion: 4-/5  L Knee Extension: 4/5  L Ankle Dorsiflexion: 4+/5        ROM: [] NT  [x] ROM measurements:      AROM Lumbar Spine   Lumbar spine general AROM: flex=50% ext= Neutral Rt SB=50% Lt SB=50%, pt demo's fair = rotation as seen during [place]           Assessment: Body Structures, Functions, Activity Limitations Requiring Skilled Therapeutic Intervention: Decreased functional mobility , Decreased ROM, Decreased strength, Decreased endurance, Decreased balance, Increased pain, Decreased posture  Assessment: Initiated gait training with rollator to improve distance and gait quality with positive response from pt. Pt making progress with all goals. Pt would benefit from continued therapy to focus and strength, balance  and gait with rollator to increase safety. Treatment Diagnosis: low back pain, impaired gait, impaired balance, decreased LE strength, impaired ROM  Therapy Prognosis: Good          Post-Pain Assessment:       Pain Rating (0-10 pain scale):   3/10   Location and pain description same as pre-treatment unless indicated. Action: [] NA   [] Perform HEP  [x] Meds as prescribed  [x] Modalities as prescribed   [] Call Physician     GOALS   Patient Goal(s): Patient Goals : decrease pain, move better    Short Term Goals Completed by 4 weeks Goal Status   STG 1 Patient will be independent with bed mobility. In progress   STG 2 Patient will report </= 5/10 pain in low back with functional mobility. In progress   STG 3 Patient will be independent with HEP. In progress       Long Term Goals Completed by 6 weeks Goal Status   LTG 1 Patient will increase lumbar ROM >/= 75% WFLs for improved functional mobility.  In progress   LTG 2 Patient will increase strength in bilateral LEs >/= 4+/5 for improved standing and ambulation tolerance. In progress   LTG 3 Au balance >/= 45/56 to demonstrate improved balance. In progress   LTG 4 Patient will ambulate with LRD for 150ft with improved bilateral step length and symmetry. In progress       Plan:  Frequency/Duration:  Plan  Plan Frequency: 2  Plan weeks: 6  Current Treatment Recommendations: Strengthening, ROM, Balance training, Functional mobility training, Transfer training, Endurance training, Gait training, Stair training, Neuromuscular re-education, Manual, Pain management, Home exercise program, Safety education & training, Patient/Caregiver education & training, Equipment evaluation, education, & procurement, Modalities  Modalities: Heat/Cold, E-stim - unattended  Pt to continue current HEP. See objective section for any therapeutic exercise changes, additions or modifications this date.     Therapy Time:      PT Individual Minutes  Time In: 1301  Time Out: 1406  Minutes: 65  Timed Code Treatment Minutes: 55 Minutes  Procedure Minutes:10, MH  Timed Activity Minutes Units   Ther Ex 10 1   Bed mobility 10 1   Gait 10 1   Neuro 25 1     Electronically signed by Audi Richardson PTA on 3/16/23 at 2:09 PM EDT

## 2023-03-16 NOTE — PROGRESS NOTES
[address]     Ph: [phone]  Fax: [phone]    [] Certification  [] Recertification []  Plan of Care  [x] Progress Note [] Discharge      Referring Provider: Natividad Merchant NP    From: [facility]  DPT    Patient: Khushi Lr (11 y.o. male) : 1945 Date: 2023   Medical Diagnosis: Low back pain, unspecified [M54.50]    Treatment Diagnosis: low back pain, impaired gait, impaired balance, decreased LE strength, impaired ROM    Plan of Care/Certification Expiration Date:     Progress Report Period from: 2/10/2023  to 3/16/2023    Visits to Date: 8 No Show: 0 Cancelled Appts: 0    OBJECTIVE:   Short Term Goals - Time Frame for Short Term Goals: 4 weeks    Goals Current/Discharge status  Status   Short Term Goal 1: Patient will be independent with bed mobility. STG 1 Current Status[de-identified] 3/16: pt with SBA, improved mobility   In progress   Short Term Goal 2: Patient will report </= 5/10 pain in low back with functional mobility. STG 2 Current Status[de-identified] 3/16: Pt reports 8/10 pain except after manaul or MH   In progress   Short Term Goal 3: Patient will be independent with HEP. STG 3 Current Status[de-identified] 3/16: Pt increasing functional activites   In progress     Long Term Goals - Time Frame for Long Term Goals : 6 weeks  Goals Current/ Discharge status Status   Long Term Goal 1: Patient will increase lumbar ROM >/= 75% WFLs for improved functional mobility. In progress   Long Term Goal 2: Patient will increase strength in bilateral LEs >/= 4+/5 for improved standing and ambulation tolerance. LTG 2 Current Status[de-identified] 3/16: Rt hip fl 3+/5, abd 4-/5, knee fl 4-/5, ext 4/5, DF 4+-5/5, Lt hip flex/abd 3+/5,Knee flex 4-/5, ext 4/5, DF 4+/5   In progress   Long Term Goal 3: Au balance >/= 45/56 to demonstrate improved balance.  LTG 3 Current Status[de-identified] 3/16: Au improved 22 pts to 42/56   In progress   Long Term Goal 4: Patient will ambulate with LRD for 150ft with improved bilateral step length and symmetry. LTG 4 Current Status[de-identified] 3/16: ambulation with rollato, slow steady heydi, improved stability for 100ft   In progress       Body Structures, Functions, Activity Limitations Requiring Skilled Therapeutic Intervention: Decreased functional mobility , Decreased ROM, Decreased strength, Decreased endurance, Decreased balance, Increased pain, Decreased posture  Assessment: Initiated gait training with rollator to improve distance and gait quality with positive response from pt. Pt making progress with all goals. Pt would benefit from continued therapy to focus and strength, balance  and gait with rollator to increase safety. PN completed this date d/t oversight and ability to progress. Therapy Prognosis: Good    PLAN: [] Evaluate and Treat  Frequency/Duration:  Plan Frequency: 2  Plan weeks: 6  Current Treatment Recommendations: Strengthening, ROM, Balance training, Functional mobility training, Transfer training, Endurance training, Gait training, Stair training, Neuromuscular re-education, Manual, Pain management, Home exercise program, Safety education & training, Patient/Caregiver education & training, Equipment evaluation, education, & procurement, Modalities  Modalities: Heat/Cold, E-stim - unattended     Precautions:                            Patient Status:[x] Continue/ Initiate plan of Care     [] Discharge PT. Recommend pt continue with HEP. [] Additional visits requested, Please re-certify for additional visits:     [] Hold     Signature:  Obj Info By:Electronically signed by Petar Trujillo PTA on 3/16/23 at 5:56 PM EDT          If you have any questions or concerns, please don't hesitate to call. Thank you for your referral.    I have reviewed this plan of care and certify a need for medically necessary rehabilitation services.     Physician Signature:__________________________________________________________  Date:  Please sign and return

## 2023-03-21 ENCOUNTER — HOSPITAL ENCOUNTER (OUTPATIENT)
Dept: PHYSICAL THERAPY | Age: 78
Setting detail: THERAPIES SERIES
Discharge: HOME | End: 2023-03-21
Payer: COMMERCIAL

## 2023-03-21 NOTE — PROGRESS NOTES
Therapy                            Cancellation/No-show Note    Date: 2023  Patient: Chucky Navas (83 y.o. male)  : 1945  MRN:  [id number]  Referring Physician: Jay Reid NP    Medical Diagnosis: Low back pain, unspecified [M54.50]      Visit Information:  Insurance: Payor: Ella Pena / Plan: Matthew Marko / Product Type: *No Product type* /   Visits to Date: 8   No Show/Cancelled Appts: 0 /       For today's appointment patient:  []  Cancelled  []  Rescheduled appointment  []  No-show   []  Called pt to remind of next appointment     Reason given by patient:  []  Patient ill  []  Conflicting appointment  []  No transportation    []  Conflict with work  []  No reason given  [x]  Other:  Pt appt cxl'd d/t no Ins approval  []   Pt has future appointments scheduled, no follow up needed  [] Pt requests to be on hold.     Reason:   If > 2 weeks please discuss with therapist.  [] Therapist to call pt for follow up     Comments:       Signature: Electronically signed by Xochilt Ramsey PTA on 3/21/23 at 1:27 PM EDT

## 2023-03-24 ENCOUNTER — HOSPITAL ENCOUNTER (OUTPATIENT)
Dept: PHYSICAL THERAPY | Age: 78
Setting detail: THERAPIES SERIES
Discharge: HOME | End: 2023-03-24
Payer: COMMERCIAL

## 2023-03-24 NOTE — PROGRESS NOTES
Therapy                            Cancellation/No-show Note    Date: 2023  Patient: Fifi Watt (30 y.o. male)  : 1945  MRN:  [id number]  Referring Physician: Charlestine Najjar, NP    Medical Diagnosis: Low back pain, unspecified [M54.50]      Visit Information:  Insurance: Payor: Jefrey Claude / Plan: Leigh Go / Product Type: *No Product type* /   Visits to Date: 8   No Show/Cancelled Appts:       For today's appointment patient:  []  Cancelled  []  Rescheduled appointment  [x]  No-show   [x]  Called pt to remind of next appointment     Reason given by patient:  []  Patient ill  []  Conflicting appointment  []  No transportation    []  Conflict with work  []  No reason given  [x]  Other:  pt states he cannot make next appt d/t conflict    [x] Pt has future appointments scheduled, no follow up needed  [] Pt requests to be on hold.     Reason:   If > 2 weeks please discuss with therapist.  [] Therapist to call pt for follow up     Comments:       Signature: Electronically signed by Kelly Gr PTA on 3/24/23 at 2:18 PM EDT

## 2023-03-28 ENCOUNTER — HOSPITAL ENCOUNTER (OUTPATIENT)
Dept: PHYSICAL THERAPY | Age: 78
Setting detail: THERAPIES SERIES
Discharge: HOME | End: 2023-03-28
Payer: COMMERCIAL

## 2023-03-28 PROCEDURE — 97110 THERAPEUTIC EXERCISES: CPT

## 2023-03-28 PROCEDURE — 97116 GAIT TRAINING THERAPY: CPT

## 2023-03-28 PROCEDURE — 97112 NEUROMUSCULAR REEDUCATION: CPT

## 2023-03-28 ASSESSMENT — PAIN DESCRIPTION - LOCATION: LOCATION: BACK;HIP

## 2023-03-28 ASSESSMENT — PAIN SCALES - GENERAL: PAINLEVEL_OUTOF10: 6

## 2023-03-28 ASSESSMENT — PAIN DESCRIPTION - ORIENTATION: ORIENTATION: RIGHT;LEFT;LOWER

## 2023-03-28 ASSESSMENT — PAIN DESCRIPTION - DESCRIPTORS: DESCRIPTORS: TIGHTNESS;ACHING

## 2023-03-28 NOTE — PROGRESS NOTES
mobility training, Transfer training, Endurance training, Gait training, Stair training, Neuromuscular re-education, Manual, Pain management, Home exercise program, Safety education & training, Patient/Caregiver education & training, Equipment evaluation, education, & procurement, Modalities  Modalities: Heat/Cold, E-stim - unattended  Pt to continue current HEP. See objective section for any therapeutic exercise changes, additions or modifications this date.     Therapy Time:      PT Individual Minutes  Time In: 0031  Time Out: 1410  Minutes: 68  Timed Code Treatment Minutes: 58 Minutes  Procedure Minutes:10, MH  Timed Activity Minutes Units   Ther Ex 28 2   Neuro 20 1   Gait 10 1     Electronically signed by Candi Hogue PTA on 3/28/23 at 1:07 PM EDT

## 2023-03-29 ENCOUNTER — OFFICE VISIT (OUTPATIENT)
Dept: ENDOCRINOLOGY | Age: 78
End: 2023-03-29

## 2023-03-29 VITALS
WEIGHT: 210 LBS | HEART RATE: 68 BPM | BODY MASS INDEX: 33.75 KG/M2 | SYSTOLIC BLOOD PRESSURE: 126 MMHG | HEIGHT: 66 IN | OXYGEN SATURATION: 96 % | DIASTOLIC BLOOD PRESSURE: 73 MMHG

## 2023-03-29 DIAGNOSIS — E11.69 TYPE 2 DIABETES MELLITUS WITH OTHER SPECIFIED COMPLICATION, WITH LONG-TERM CURRENT USE OF INSULIN (HCC): Primary | ICD-10-CM

## 2023-03-29 DIAGNOSIS — Z79.4 TYPE 2 DIABETES MELLITUS WITH OTHER SPECIFIED COMPLICATION, WITH LONG-TERM CURRENT USE OF INSULIN (HCC): Primary | ICD-10-CM

## 2023-03-29 DIAGNOSIS — N18.30 STAGE 3 CHRONIC KIDNEY DISEASE, UNSPECIFIED WHETHER STAGE 3A OR 3B CKD (HCC): ICD-10-CM

## 2023-03-29 LAB
CHP ED QC CHECK: NORMAL
GLUCOSE BLD-MCNC: 139 MG/DL

## 2023-03-29 RX ORDER — INSULIN LISPRO 100 [IU]/ML
INJECTION, SUSPENSION SUBCUTANEOUS
Qty: 45 ML | Refills: 5 | Status: SHIPPED | OUTPATIENT
Start: 2023-03-29

## 2023-03-29 NOTE — PROGRESS NOTES
Conjunctivae normal.   Cardiovascular:      Rate and Rhythm: Normal rate. Pulmonary:      Effort: Pulmonary effort is normal.   Musculoskeletal:         General: Normal range of motion. Cervical back: Normal range of motion and neck supple. Right lower leg: No edema. Left lower leg: No edema. Neurological:      General: No focal deficit present. Mental Status: He is alert and oriented to person, place, and time.    Psychiatric:         Mood and Affect: Mood normal.         Behavior: Behavior normal.

## 2023-03-31 ENCOUNTER — HOSPITAL ENCOUNTER (OUTPATIENT)
Dept: PHYSICAL THERAPY | Age: 78
Setting detail: THERAPIES SERIES
Discharge: HOME | End: 2023-03-31
Payer: COMMERCIAL

## 2023-03-31 PROCEDURE — 97112 NEUROMUSCULAR REEDUCATION: CPT

## 2023-03-31 PROCEDURE — 97110 THERAPEUTIC EXERCISES: CPT

## 2023-03-31 PROCEDURE — 97116 GAIT TRAINING THERAPY: CPT

## 2023-03-31 ASSESSMENT — PAIN DESCRIPTION - DESCRIPTORS: DESCRIPTORS: ACHING;TIGHTNESS

## 2023-03-31 ASSESSMENT — PAIN DESCRIPTION - LOCATION: LOCATION: BACK

## 2023-03-31 ASSESSMENT — PAIN DESCRIPTION - ORIENTATION: ORIENTATION: LOWER

## 2023-03-31 ASSESSMENT — PAIN DESCRIPTION - PAIN TYPE: TYPE: CHRONIC PAIN

## 2023-03-31 NOTE — PROGRESS NOTES
progress   LTG 4 Patient will ambulate with LRD for 150ft with improved bilateral step length and symmetry. In progress     Plan:  Frequency/Duration:  Plan  Plan Frequency: 2  Plan weeks: 6  Current Treatment Recommendations: Strengthening, ROM, Balance training, Functional mobility training, Transfer training, Endurance training, Gait training, Stair training, Neuromuscular re-education, Manual, Pain management, Home exercise program, Safety education & training, Patient/Caregiver education & training, Equipment evaluation, education, & procurement, Modalities  Modalities: Heat/Cold, E-stim - unattended  Pt to continue current HEP. See objective section for any therapeutic exercise changes, additions or modifications this date.     Therapy Time:      PT Individual Minutes  Time In: 9328  Time Out: 7620  Minutes: 63  Timed Code Treatment Minutes: 53 Minutes  Procedure Minutes:0  Timed Activity Minutes Units   Ther Ex 18 1   Neuro 25 2   Gait 10 1     Electronically signed by Candi Hogue PTA on 3/31/23 at 1:14 PM EDT

## 2023-04-02 ASSESSMENT — ENCOUNTER SYMPTOMS: EYES NEGATIVE: 1

## 2023-04-03 ENCOUNTER — HOSPITAL ENCOUNTER (OUTPATIENT)
Dept: PHYSICAL THERAPY | Age: 78
Setting detail: THERAPIES SERIES
Discharge: HOME | End: 2023-04-03
Payer: COMMERCIAL

## 2023-04-03 PROCEDURE — 97112 NEUROMUSCULAR REEDUCATION: CPT

## 2023-04-03 PROCEDURE — 97116 GAIT TRAINING THERAPY: CPT

## 2023-04-03 PROCEDURE — 97110 THERAPEUTIC EXERCISES: CPT

## 2023-04-03 ASSESSMENT — PAIN DESCRIPTION - PAIN TYPE: TYPE: CHRONIC PAIN

## 2023-04-03 ASSESSMENT — PAIN DESCRIPTION - ORIENTATION: ORIENTATION: RIGHT;LEFT;LOWER

## 2023-04-03 ASSESSMENT — PAIN SCALES - GENERAL: PAINLEVEL_OUTOF10: 8

## 2023-04-03 ASSESSMENT — PAIN DESCRIPTION - LOCATION: LOCATION: HIP;BACK;GROIN

## 2023-04-03 ASSESSMENT — PAIN DESCRIPTION - DESCRIPTORS: DESCRIPTORS: ACHING;POUNDING

## 2023-04-03 NOTE — PROGRESS NOTES
[facility]  Outpatient Physical Therapy    Treatment Note        Date: 4/3/2023  Patient: Riana Pod  : 1945   Confirmed: Yes  MRN: [id number]  Referring Provider: Greg Sweeney NP    Medical Diagnosis: Low back pain, unspecified [M54.50]       Treatment Diagnosis: low back pain, impaired gait, impaired balance, decreased LE strength, impaired ROM    Visit Information:  Insurance: Payor: Afshan Nix / Plan: Kieran Membreno / Product Type: *No Product type* /   PT Visit Information  PT Insurance Information: Joao Lynch  Total # of Visits to Date: 10  No Show: 1  Progress Note Due Date: 23  Canceled Appointment: 1  Progress Note Counter: 3/12( 12/48 units from 3/21-23)    Subjective Information:  Subjective: Pt states his hips and walking are painful today, took Tylenol before coming to therapy  HEP Compliance:  [x] Good [] Fair [] Poor [] Reports not doing due to:    Pain Screening  Patient Currently in Pain: Yes  Pain Assessment: 0-10  Pain Level: 8  Pain Type: Chronic pain  Pain Location: Hip, Back, Groin  Pain Orientation: Right, Left, Lower  Pain Descriptors: Aching, Pounding    Treatment:  Exercises:  Exercises  Exercise 2: SciFit L2.7 x 12 min  Exercise 10: gait drills- attempted cross crawls( too painful)/ March,  Tandem( F/R) 0-2  Exercise 11: sink ex : Squats x 8, inc'd knee pain, March  Exercise 15: Andrés HS 30s x 3,om  6\" box Gastroc str with strap 30s x3       Modalities:  Moist Heat (CPT 12349)  Patient Position: Seated  Number Minutes Moist Heat: 10  Moist heat location: Low back  Post treatment skin assessment: Redness - no adverse reaction       *Indicates exercise, modality, or manual techniques to be initiated when appropriate    Objective Measures:      Ambulation  Surface: Carpet  Device: Rollator  Assistance: Supervision  Quality of Gait: slow steady pace, decreased foot clearance, fwd flexed posture, around obstacles  Gait

## 2023-04-07 ENCOUNTER — HOSPITAL ENCOUNTER (OUTPATIENT)
Dept: PHYSICAL THERAPY | Age: 78
Setting detail: THERAPIES SERIES
Discharge: HOME | End: 2023-04-07
Payer: COMMERCIAL

## 2023-04-07 PROCEDURE — 97112 NEUROMUSCULAR REEDUCATION: CPT

## 2023-04-07 PROCEDURE — 97110 THERAPEUTIC EXERCISES: CPT

## 2023-04-07 PROCEDURE — 97116 GAIT TRAINING THERAPY: CPT

## 2023-04-07 ASSESSMENT — PAIN DESCRIPTION - ORIENTATION: ORIENTATION: RIGHT;LEFT;LOWER

## 2023-04-07 ASSESSMENT — PAIN DESCRIPTION - DESCRIPTORS: DESCRIPTORS: ACHING;POUNDING

## 2023-04-07 ASSESSMENT — PAIN DESCRIPTION - LOCATION: LOCATION: HIP;BACK;GROIN

## 2023-04-07 ASSESSMENT — PAIN SCALES - GENERAL: PAINLEVEL_OUTOF10: 5

## 2023-04-07 NOTE — PROGRESS NOTES
6  Current Treatment Recommendations: Strengthening, ROM, Balance training, Functional mobility training, Transfer training, Endurance training, Gait training, Stair training, Neuromuscular re-education, Manual, Pain management, Home exercise program, Safety education & training, Patient/Caregiver education & training, Equipment evaluation, education, & procurement, Modalities  Modalities: Heat/Cold, E-stim - unattended  Pt to continue current HEP. See objective section for any therapeutic exercise changes, additions or modifications this date.     Therapy Time:      PT Individual Minutes  Time In: 6413  Time Out: 4592  Minutes: 74  Timed Code Treatment Minutes: 64 Minutes  Procedure Minutes:0  Timed Activity Minutes Units   Neuro 42 2   Manual  6 0   Gait 8 1   Therex 8 1     Electronically signed by Araceli Skinner on 4/7/23 at 4:02 PM EDT    Treatment directly supervised by Electronically signed by Arely Dave PTA on 4/7/23 at 4:08 PM EDT

## 2023-04-10 ENCOUNTER — HOSPITAL ENCOUNTER (OUTPATIENT)
Dept: PHYSICAL THERAPY | Age: 78
Setting detail: THERAPIES SERIES
End: 2023-04-10
Payer: COMMERCIAL

## 2023-04-10 ASSESSMENT — PAIN SCALES - GENERAL: PAINLEVEL_OUTOF10: 4

## 2023-04-10 ASSESSMENT — PAIN DESCRIPTION - LOCATION: LOCATION: BACK;LEG

## 2023-04-10 ASSESSMENT — PAIN DESCRIPTION - ORIENTATION: ORIENTATION: RIGHT;LEFT;LOWER

## 2023-04-10 ASSESSMENT — PAIN DESCRIPTION - DESCRIPTORS: DESCRIPTORS: ACHING

## 2023-04-10 NOTE — PROGRESS NOTES
[facility]  Outpatient Physical Therapy    Treatment Note        Date: 4/10/2023  Patient: Curt Oconnor  : 1945   Confirmed: Yes  MRN: [id number]  Referring Provider: Bri Ann NP    Medical Diagnosis: Low back pain, unspecified [M54.50]       Treatment Diagnosis: low back pain, impaired gait, impaired balance, decreased LE strength, impaired ROM    Visit Information:  Insurance: Payor: Hermila Harley / Plan: Majo Chan / Product Type: *No Product type* /   PT Visit Information  PT Insurance Information: Flakita Watts  Total # of Visits to Date: 12  No Show: 1  Progress Note Due Date: 23  Canceled Appointment: 1  Progress Note Counter: ( 20/48 units from 3/21-23)    Subjective Information:  Subjective: Pt states he is wearing a compression vest which gives him support  HEP Compliance:  [x] Good [] Fair [] Poor [] Reports not doing due to:    Pain Screening  Patient Currently in Pain: Yes  Pain Assessment: 0-10  Pain Level: 4  Pain Location: Back, Leg  Pain Orientation: Right, Left, Lower  Pain Descriptors: Aching    Treatment:  Exercises:  Exercises  Exercise 2: UBE L1 x 5 MIN ue/le  Exercise 3: Hip flexor stretch on stairs 20s x 3, Andrés  Exercise 4: Rows/lats RTB x 10 ea  Exercise 5: standing posture against wall x 1 min  Exercise 7: Hurdles 6\" F/L 1-0 ue supp  Exercise 8: sit to stands x 5 from low chair  Exercise 10: gait drills: Ladder F/L x 0-1 ue, obstacles foam/ lorenzo/4\" block 0-1 ue supp         Modalities:  Cryotherapy (CPT 26000)  Patient Position: Seated  Number Minutes Cryotherapy: 10'  Cryotherapy location: Thoracic  Post treatment skin assessment: Redness - no adverse reaction  Limitations addressed: Pain modulation       *Indicates exercise, modality, or manual techniques to be initiated when appropriate    Objective Measures:      Ambulation  Surface: Outdoors, Uneven  Device: Rollator  Assistance: Supervision  Quality of

## 2023-04-13 ENCOUNTER — APPOINTMENT (OUTPATIENT)
Dept: PHYSICAL THERAPY | Age: 78
End: 2023-04-13
Payer: COMMERCIAL

## 2023-04-13 ASSESSMENT — PAIN DESCRIPTION - ORIENTATION: ORIENTATION: RIGHT;LEFT;LOWER

## 2023-04-13 ASSESSMENT — PAIN SCALES - GENERAL: PAINLEVEL_OUTOF10: 8

## 2023-04-13 ASSESSMENT — PAIN DESCRIPTION - LOCATION: LOCATION: HIP;KNEE;BACK

## 2023-04-13 ASSESSMENT — PAIN DESCRIPTION - DESCRIPTORS: DESCRIPTORS: SORE;SHARP

## 2023-04-13 ASSESSMENT — PAIN DESCRIPTION - PAIN TYPE: TYPE: CHRONIC PAIN

## 2023-06-12 DIAGNOSIS — Z79.4 TYPE 2 DIABETES MELLITUS WITH OTHER SPECIFIED COMPLICATION, WITH LONG-TERM CURRENT USE OF INSULIN (HCC): ICD-10-CM

## 2023-06-12 DIAGNOSIS — E11.69 TYPE 2 DIABETES MELLITUS WITH OTHER SPECIFIED COMPLICATION, WITH LONG-TERM CURRENT USE OF INSULIN (HCC): ICD-10-CM

## 2023-06-12 LAB
ANION GAP SERPL CALCULATED.3IONS-SCNC: 10 MEQ/L (ref 9–15)
BUN SERPL-MCNC: 40 MG/DL (ref 8–23)
CALCIUM SERPL-MCNC: 8.5 MG/DL (ref 8.5–9.9)
CHLORIDE SERPL-SCNC: 109 MEQ/L (ref 95–107)
CO2 SERPL-SCNC: 23 MEQ/L (ref 20–31)
CREAT SERPL-MCNC: 1.78 MG/DL (ref 0.7–1.2)
GLUCOSE SERPL-MCNC: 95 MG/DL (ref 70–99)
HBA1C MFR BLD: 7.1 % (ref 4.8–5.9)
POTASSIUM SERPL-SCNC: 5.2 MEQ/L (ref 3.4–4.9)
SODIUM SERPL-SCNC: 142 MEQ/L (ref 135–144)

## 2023-06-28 ENCOUNTER — OFFICE VISIT (OUTPATIENT)
Dept: ENDOCRINOLOGY | Age: 78
End: 2023-06-28

## 2023-06-28 VITALS
WEIGHT: 214 LBS | SYSTOLIC BLOOD PRESSURE: 138 MMHG | HEART RATE: 64 BPM | DIASTOLIC BLOOD PRESSURE: 70 MMHG | BODY MASS INDEX: 34.39 KG/M2 | OXYGEN SATURATION: 98 % | HEIGHT: 66 IN

## 2023-06-28 DIAGNOSIS — E11.69 TYPE 2 DIABETES MELLITUS WITH OTHER SPECIFIED COMPLICATION, WITH LONG-TERM CURRENT USE OF INSULIN (HCC): Primary | ICD-10-CM

## 2023-06-28 DIAGNOSIS — Z79.4 TYPE 2 DIABETES MELLITUS WITH OTHER SPECIFIED COMPLICATION, WITH LONG-TERM CURRENT USE OF INSULIN (HCC): Primary | ICD-10-CM

## 2023-06-28 LAB
CHP ED QC CHECK: NORMAL
GLUCOSE BLD-MCNC: 158 MG/DL

## 2023-06-28 RX ORDER — INSULIN LISPRO 100 [IU]/ML
INJECTION, SUSPENSION SUBCUTANEOUS
Qty: 45 ML | Refills: 5 | Status: SHIPPED | OUTPATIENT
Start: 2023-06-28

## 2023-06-28 RX ORDER — FLUTICASONE PROPIONATE 50 MCG
SPRAY, SUSPENSION (ML) NASAL
COMMUNITY
Start: 2023-05-26

## 2023-06-28 RX ORDER — FLURBIPROFEN SODIUM 0.3 MG/ML
SOLUTION/ DROPS OPHTHALMIC
Qty: 100 EACH | Refills: 5 | Status: SHIPPED | OUTPATIENT
Start: 2023-06-28

## 2023-07-01 ASSESSMENT — ENCOUNTER SYMPTOMS: VISUAL CHANGE: 0

## 2023-08-21 DIAGNOSIS — E11.00 DM HYPEROSMOLARITY TYPE II, UNCONTROLLED (HCC): Primary | ICD-10-CM

## 2023-08-21 DIAGNOSIS — E11.65 DM HYPEROSMOLARITY TYPE II, UNCONTROLLED (HCC): Primary | ICD-10-CM

## 2023-08-21 RX ORDER — BLOOD SUGAR DIAGNOSTIC
STRIP MISCELLANEOUS
Qty: 100 EACH | Refills: 3 | Status: SHIPPED | OUTPATIENT
Start: 2023-08-21

## 2023-09-12 DIAGNOSIS — E11.69 TYPE 2 DIABETES MELLITUS WITH OTHER SPECIFIED COMPLICATION, WITH LONG-TERM CURRENT USE OF INSULIN (HCC): ICD-10-CM

## 2023-09-12 DIAGNOSIS — Z79.4 TYPE 2 DIABETES MELLITUS WITH OTHER SPECIFIED COMPLICATION, WITH LONG-TERM CURRENT USE OF INSULIN (HCC): ICD-10-CM

## 2023-09-12 LAB
ANION GAP SERPL CALCULATED.3IONS-SCNC: 13 MEQ/L (ref 9–15)
BUN SERPL-MCNC: 39 MG/DL (ref 8–23)
CALCIUM SERPL-MCNC: 9 MG/DL (ref 8.5–9.9)
CHLORIDE SERPL-SCNC: 107 MEQ/L (ref 95–107)
CO2 SERPL-SCNC: 21 MEQ/L (ref 20–31)
CREAT SERPL-MCNC: 1.99 MG/DL (ref 0.7–1.2)
GLUCOSE SERPL-MCNC: 123 MG/DL (ref 70–99)
HBA1C MFR BLD: 7.4 % (ref 4.8–5.9)
POTASSIUM SERPL-SCNC: 5.8 MEQ/L (ref 3.4–4.9)
SODIUM SERPL-SCNC: 141 MEQ/L (ref 135–144)

## 2023-10-12 ENCOUNTER — OFFICE VISIT (OUTPATIENT)
Dept: ENDOCRINOLOGY | Age: 78
End: 2023-10-12

## 2023-10-12 VITALS
DIASTOLIC BLOOD PRESSURE: 67 MMHG | HEART RATE: 56 BPM | OXYGEN SATURATION: 95 % | HEIGHT: 66 IN | BODY MASS INDEX: 34.55 KG/M2 | WEIGHT: 215 LBS | SYSTOLIC BLOOD PRESSURE: 146 MMHG

## 2023-10-12 DIAGNOSIS — E11.69 TYPE 2 DIABETES MELLITUS WITH OTHER SPECIFIED COMPLICATION, WITH LONG-TERM CURRENT USE OF INSULIN (HCC): ICD-10-CM

## 2023-10-12 DIAGNOSIS — I10 HYPERTENSION, UNSPECIFIED TYPE: Chronic | ICD-10-CM

## 2023-10-12 DIAGNOSIS — Z79.4 TYPE 2 DIABETES MELLITUS WITH OTHER SPECIFIED COMPLICATION, WITH LONG-TERM CURRENT USE OF INSULIN (HCC): ICD-10-CM

## 2023-10-12 DIAGNOSIS — E11.00 DM HYPEROSMOLARITY TYPE II, UNCONTROLLED (HCC): Primary | ICD-10-CM

## 2023-10-12 DIAGNOSIS — E11.65 DM HYPEROSMOLARITY TYPE II, UNCONTROLLED (HCC): Primary | ICD-10-CM

## 2023-10-12 LAB
CHP ED QC CHECK: NORMAL
GLUCOSE BLD-MCNC: NORMAL MG/DL

## 2023-10-12 RX ORDER — GABAPENTIN 300 MG/1
CAPSULE ORAL
Qty: 180 CAPSULE | Refills: 1 | Status: SHIPPED | OUTPATIENT
Start: 2023-10-12 | End: 2023-11-28

## 2023-10-12 RX ORDER — INSULIN LISPRO 100 [IU]/ML
INJECTION, SUSPENSION SUBCUTANEOUS
Qty: 45 ML | Refills: 5 | Status: SHIPPED | OUTPATIENT
Start: 2023-10-12

## 2023-10-12 RX ORDER — HYDROCHLOROTHIAZIDE 25 MG/1
25 TABLET ORAL DAILY
Qty: 90 TABLET | Refills: 1 | Status: SHIPPED | OUTPATIENT
Start: 2023-10-12

## 2023-10-12 RX ORDER — AMLODIPINE BESYLATE 5 MG/1
5 TABLET ORAL DAILY
Qty: 90 TABLET | Refills: 1 | Status: SHIPPED | OUTPATIENT
Start: 2023-10-12

## 2023-10-12 NOTE — PROGRESS NOTES
Vaccine Information Sheet, \"Influenza - Inactivated\"  given to Sarah Valladares, or parent/legal guardian of  Sarah Valladares and verbalized understanding. Patient responses:    Have you ever had a reaction to a flu vaccine? No  Are you able to eat eggs without adverse effects? No  Do you have any current illness? No  Have you ever had [place] Syndrome? No    Flu vaccine given per order. Please see immunization tab.
(PRINIVIL;ZESTRIL) 20 MG tablet, TAKE 1 TABLET BY MOUTH EVERY DAY, Disp: , Rfl:     simvastatin (ZOCOR) 40 MG tablet, TAKE 1 TABLET BY MOUTH EVERY DAY IN THE EVENING, Disp: , Rfl:     calcitRIOL (ROCALTROL) 0.25 MCG capsule, Take 1 capsule by mouth daily, Disp: , Rfl:     pantoprazole (PROTONIX) 40 MG tablet, Take 1 tablet by mouth daily, Disp: , Rfl:     carvedilol (COREG) 6.25 MG tablet, Take 6.5 mg by mouth daily, Disp: , Rfl:     amLODIPine (NORVASC) 5 MG tablet, TAKE 1 TABLET BY MOUTH DAILY, Disp: 90 tablet, Rfl: 1    hydrochlorothiazide (HYDRODIURIL) 25 MG tablet, TAKE 1 TABLET BY MOUTH DAILY, Disp: 90 tablet, Rfl: 1    ranitidine (ZANTAC) 150 MG tablet, Take 1 tablet by mouth 2 times daily, Disp: , Rfl:     gabapentin (NEURONTIN) 300 MG capsule, TAKE ONE CAPSULE BY MOUTH TWICE DAILY, Disp: 180 capsule, Rfl: 1    blood glucose test strips (ONE TOUCH ULTRA TEST) strip, USE TO TEST GLUCOSE FOUR TIMES DAILY, Disp: 300 strip, Rfl: 3  Lab Results   Component Value Date     10/09/2023    K 5.4 (H) 10/09/2023     (H) 10/09/2023    CO2 25 10/09/2023    BUN 29 (H) 10/09/2023    CREATININE 1.43 (H) 10/09/2023    GLUCOSE 90 10/09/2023    CALCIUM 9.1 10/09/2023    PROT 6.8 02/21/2023    LABALBU 4.4 10/09/2023    BILITOT 0.3 02/21/2023    ALKPHOS 117 (H) 02/21/2023    AST 10 02/21/2023    ALT 9 02/21/2023    LABGLOM 49.5 (L) 10/09/2023    GFRAA 42.9 (L) 10/11/2022    GLOB 2.5 02/21/2023     Lab Results   Component Value Date    WBC 5.2 10/09/2023    HGB 11.0 (L) 10/09/2023    HCT 34.9 (L) 10/09/2023    MCV 93.8 (H) 10/09/2023     10/09/2023     Lab Results   Component Value Date    LABA1C 7.4 (H) 09/12/2023    LABA1C 7.1 (H) 06/12/2023    LABA1C 6.9 (H) 02/21/2023     Lab Results   Component Value Date    CHOLFAST 175 11/10/2022    TRIGLYCFAST 218 (H) 11/10/2022    HDL 43 11/10/2022    HDL 47 01/13/2020    HDL 50 12/03/2018    LDLCALC 88 11/10/2022    LDLCALC 108 01/13/2020    LDLCALC 180 (H) 12/03/2018

## 2024-01-13 DIAGNOSIS — Z79.4 TYPE 2 DIABETES MELLITUS WITH OTHER SPECIFIED COMPLICATION, WITH LONG-TERM CURRENT USE OF INSULIN (HCC): ICD-10-CM

## 2024-01-13 DIAGNOSIS — E11.69 TYPE 2 DIABETES MELLITUS WITH OTHER SPECIFIED COMPLICATION, WITH LONG-TERM CURRENT USE OF INSULIN (HCC): ICD-10-CM

## 2024-01-15 RX ORDER — GABAPENTIN 300 MG/1
CAPSULE ORAL
Qty: 180 CAPSULE | Refills: 1 | Status: SHIPPED | OUTPATIENT
Start: 2024-01-15 | End: 2024-02-15

## 2024-03-19 DIAGNOSIS — Z79.4 TYPE 2 DIABETES MELLITUS WITH OTHER SPECIFIED COMPLICATION, WITH LONG-TERM CURRENT USE OF INSULIN (HCC): ICD-10-CM

## 2024-03-19 DIAGNOSIS — E11.69 TYPE 2 DIABETES MELLITUS WITH OTHER SPECIFIED COMPLICATION, WITH LONG-TERM CURRENT USE OF INSULIN (HCC): ICD-10-CM

## 2024-03-19 RX ORDER — BLOOD SUGAR DIAGNOSTIC
STRIP MISCELLANEOUS
Qty: 200 STRIP | Refills: 3 | Status: SHIPPED | OUTPATIENT
Start: 2024-03-19

## 2024-03-30 DIAGNOSIS — E11.00 DM HYPEROSMOLARITY TYPE II, UNCONTROLLED (HCC): ICD-10-CM

## 2024-03-30 LAB
ANION GAP SERPL CALCULATED.3IONS-SCNC: 11 MEQ/L (ref 9–15)
BUN SERPL-MCNC: 36 MG/DL (ref 8–23)
CALCIUM SERPL-MCNC: 9.2 MG/DL (ref 8.5–9.9)
CHLORIDE SERPL-SCNC: 106 MEQ/L (ref 95–107)
CO2 SERPL-SCNC: 24 MEQ/L (ref 20–31)
CREAT SERPL-MCNC: 1.86 MG/DL (ref 0.7–1.2)
GLUCOSE SERPL-MCNC: 151 MG/DL (ref 70–99)
HBA1C MFR BLD: 8.2 % (ref 4.8–5.9)
POTASSIUM SERPL-SCNC: 5.6 MEQ/L (ref 3.4–4.9)
SODIUM SERPL-SCNC: 141 MEQ/L (ref 135–144)

## 2024-04-11 ENCOUNTER — OFFICE VISIT (OUTPATIENT)
Dept: ENDOCRINOLOGY | Age: 79
End: 2024-04-11
Payer: COMMERCIAL

## 2024-04-11 VITALS
HEIGHT: 66 IN | HEART RATE: 57 BPM | OXYGEN SATURATION: 93 % | SYSTOLIC BLOOD PRESSURE: 143 MMHG | BODY MASS INDEX: 35.68 KG/M2 | WEIGHT: 222 LBS | DIASTOLIC BLOOD PRESSURE: 64 MMHG

## 2024-04-11 DIAGNOSIS — E11.69 TYPE 2 DIABETES MELLITUS WITH OTHER SPECIFIED COMPLICATION, WITH LONG-TERM CURRENT USE OF INSULIN (HCC): Primary | ICD-10-CM

## 2024-04-11 DIAGNOSIS — Z79.4 TYPE 2 DIABETES MELLITUS WITH OTHER SPECIFIED COMPLICATION, WITH LONG-TERM CURRENT USE OF INSULIN (HCC): Primary | ICD-10-CM

## 2024-04-11 DIAGNOSIS — I10 HYPERTENSION, UNSPECIFIED TYPE: Chronic | ICD-10-CM

## 2024-04-11 LAB
CHP ED QC CHECK: ABNORMAL
GLUCOSE BLD-MCNC: 205 MG/DL

## 2024-04-11 PROCEDURE — 1123F ACP DISCUSS/DSCN MKR DOCD: CPT | Performed by: INTERNAL MEDICINE

## 2024-04-11 PROCEDURE — 99213 OFFICE O/P EST LOW 20 MIN: CPT | Performed by: INTERNAL MEDICINE

## 2024-04-11 PROCEDURE — G8427 DOCREV CUR MEDS BY ELIG CLIN: HCPCS | Performed by: INTERNAL MEDICINE

## 2024-04-11 PROCEDURE — 82962 GLUCOSE BLOOD TEST: CPT | Performed by: INTERNAL MEDICINE

## 2024-04-11 PROCEDURE — 1036F TOBACCO NON-USER: CPT | Performed by: INTERNAL MEDICINE

## 2024-04-11 PROCEDURE — 3052F HG A1C>EQUAL 8.0%<EQUAL 9.0%: CPT | Performed by: INTERNAL MEDICINE

## 2024-04-11 PROCEDURE — 3078F DIAST BP <80 MM HG: CPT | Performed by: INTERNAL MEDICINE

## 2024-04-11 PROCEDURE — G8417 CALC BMI ABV UP PARAM F/U: HCPCS | Performed by: INTERNAL MEDICINE

## 2024-04-11 PROCEDURE — 3077F SYST BP >= 140 MM HG: CPT | Performed by: INTERNAL MEDICINE

## 2024-04-11 RX ORDER — AMLODIPINE BESYLATE 10 MG/1
10 TABLET ORAL DAILY
COMMUNITY
Start: 2024-03-24

## 2024-04-11 RX ORDER — SIMVASTATIN 20 MG
20 TABLET ORAL EVERY EVENING
COMMUNITY
Start: 2024-02-09 | End: 2024-04-11 | Stop reason: SDUPTHER

## 2024-04-11 RX ORDER — SIMVASTATIN 20 MG
20 TABLET ORAL EVERY EVENING
Qty: 30 TABLET | Refills: 3 | Status: SHIPPED | OUTPATIENT
Start: 2024-04-11

## 2024-04-11 RX ORDER — HYDROCHLOROTHIAZIDE 25 MG/1
25 TABLET ORAL DAILY
Qty: 90 TABLET | Refills: 1 | Status: SHIPPED | OUTPATIENT
Start: 2024-04-11

## 2024-04-11 RX ORDER — GABAPENTIN 300 MG/1
CAPSULE ORAL
Qty: 180 CAPSULE | Refills: 1 | Status: SHIPPED | OUTPATIENT
Start: 2024-04-11 | End: 2024-05-12

## 2024-04-11 NOTE — PROGRESS NOTES
4/11/2024    Assessment:       Diagnosis Orders   1. Type 2 diabetes mellitus with other specified complication, with long-term current use of insulin ([facility])  POCT Glucose      2. Hypertension, unspecified type              PLAN:     Orders Placed This Encounter   Procedures    Basic Metabolic Panel     Standing Status:   Future     Standing Expiration Date:   4/11/2025    Hemoglobin A1C     Standing Status:   Future     Standing Expiration Date:   4/11/2025    POCT Glucose     Orders Placed This Encounter   Medications    hydroCHLOROthiazide (HYDRODIURIL) 25 MG tablet     Sig: Take 1 tablet by mouth daily     Dispense:  90 tablet     Refill:  1     **Patient requests 90 days supply**    gabapentin (NEURONTIN) 300 MG capsule     Sig: TAKE 1 CAPSULE BY MOUTH TWICE DAILY     Dispense:  180 capsule     Refill:  1    simvastatin (ZOCOR) 20 MG tablet     Sig: Take 1 tablet by mouth every evening     Dispense:  30 tablet     Refill:  3     Continue current medication regimen for diabetes continue current dose of gabapentin reviewed OARRS      Orders Placed This Encounter   Procedures    POCT Glucose     No orders of the defined types were placed in this encounter.    No follow-ups on file.  Subjective:     Chief Complaint   Patient presents with    Diabetes    Hypertension     Vitals:    04/11/24 1622 04/11/24 1626   BP: (!) 142/64 (!) 143/64   Pulse: 57    SpO2: 93%    Weight: 100.7 kg (222 lb)    Height: 1.676 m (5' 5.98\")      Wt Readings from Last 3 Encounters:   04/11/24 100.7 kg (222 lb)   10/12/23 97.5 kg (215 lb)   06/28/23 97.1 kg (214 lb)     BP Readings from Last 3 Encounters:   04/11/24 (!) 143/64   10/12/23 (!) 146/67   06/28/23 138/70       Follow-up on type 2 diabetes history of neuropathy osteoarthritis chronic kidney disease on 75/25 Humalog 25 units twice daily hemoglobin A1c was six 8.2 requesting prescription for simvastatin hydrochlorothiazide gabapentin average blood sugars close to 180    Hemoglobin

## 2024-07-06 DIAGNOSIS — E11.69 TYPE 2 DIABETES MELLITUS WITH OTHER SPECIFIED COMPLICATION, WITH LONG-TERM CURRENT USE OF INSULIN (HCC): ICD-10-CM

## 2024-07-06 DIAGNOSIS — Z79.4 TYPE 2 DIABETES MELLITUS WITH OTHER SPECIFIED COMPLICATION, WITH LONG-TERM CURRENT USE OF INSULIN (HCC): ICD-10-CM

## 2024-07-08 RX ORDER — FLURBIPROFEN SODIUM 0.3 MG/ML
SOLUTION/ DROPS OPHTHALMIC
Qty: 100 EACH | Refills: 5 | Status: SHIPPED | OUTPATIENT
Start: 2024-07-08

## 2024-07-31 ENCOUNTER — OFFICE VISIT (OUTPATIENT)
Dept: FAMILY MEDICINE CLINIC | Age: 79
End: 2024-07-31
Payer: COMMERCIAL

## 2024-07-31 VITALS
TEMPERATURE: 97.7 F | WEIGHT: 221.2 LBS | HEIGHT: 65 IN | SYSTOLIC BLOOD PRESSURE: 130 MMHG | BODY MASS INDEX: 36.85 KG/M2 | OXYGEN SATURATION: 96 % | HEART RATE: 72 BPM | DIASTOLIC BLOOD PRESSURE: 70 MMHG

## 2024-07-31 DIAGNOSIS — R09.89 DECREASED PULSES IN FEET: ICD-10-CM

## 2024-07-31 DIAGNOSIS — E11.69 TYPE 2 DIABETES MELLITUS WITH OTHER SPECIFIED COMPLICATION, WITH LONG-TERM CURRENT USE OF INSULIN (HCC): Chronic | ICD-10-CM

## 2024-07-31 DIAGNOSIS — E78.00 HYPERCHOLESTEREMIA: Chronic | ICD-10-CM

## 2024-07-31 DIAGNOSIS — J30.9 ALLERGIC RHINITIS, UNSPECIFIED SEASONALITY, UNSPECIFIED TRIGGER: ICD-10-CM

## 2024-07-31 DIAGNOSIS — C61 PROSTATE CA (HCC): ICD-10-CM

## 2024-07-31 DIAGNOSIS — Z79.4 TYPE 2 DIABETES MELLITUS WITH OTHER SPECIFIED COMPLICATION, WITH LONG-TERM CURRENT USE OF INSULIN (HCC): Chronic | ICD-10-CM

## 2024-07-31 DIAGNOSIS — K21.9 GASTROESOPHAGEAL REFLUX DISEASE WITHOUT ESOPHAGITIS: ICD-10-CM

## 2024-07-31 DIAGNOSIS — I10 PRIMARY HYPERTENSION: Primary | Chronic | ICD-10-CM

## 2024-07-31 PROCEDURE — 1036F TOBACCO NON-USER: CPT | Performed by: FAMILY MEDICINE

## 2024-07-31 PROCEDURE — G8417 CALC BMI ABV UP PARAM F/U: HCPCS | Performed by: FAMILY MEDICINE

## 2024-07-31 PROCEDURE — 99204 OFFICE O/P NEW MOD 45 MIN: CPT | Performed by: FAMILY MEDICINE

## 2024-07-31 PROCEDURE — 3078F DIAST BP <80 MM HG: CPT | Performed by: FAMILY MEDICINE

## 2024-07-31 PROCEDURE — 3052F HG A1C>EQUAL 8.0%<EQUAL 9.0%: CPT | Performed by: FAMILY MEDICINE

## 2024-07-31 PROCEDURE — 1123F ACP DISCUSS/DSCN MKR DOCD: CPT | Performed by: FAMILY MEDICINE

## 2024-07-31 PROCEDURE — 3075F SYST BP GE 130 - 139MM HG: CPT | Performed by: FAMILY MEDICINE

## 2024-07-31 PROCEDURE — G8427 DOCREV CUR MEDS BY ELIG CLIN: HCPCS | Performed by: FAMILY MEDICINE

## 2024-07-31 RX ORDER — PANTOPRAZOLE SODIUM 40 MG/1
40 TABLET, DELAYED RELEASE ORAL DAILY
Qty: 90 TABLET | Refills: 1 | Status: SHIPPED | OUTPATIENT
Start: 2024-07-31

## 2024-07-31 RX ORDER — LISINOPRIL 20 MG/1
20 TABLET ORAL DAILY
Qty: 90 TABLET | Refills: 1 | Status: SHIPPED | OUTPATIENT
Start: 2024-07-31

## 2024-07-31 RX ORDER — CETIRIZINE HYDROCHLORIDE 10 MG/1
10 TABLET ORAL DAILY
COMMUNITY

## 2024-07-31 RX ORDER — CARVEDILOL 6.25 MG/1
6.5 TABLET ORAL DAILY
Qty: 180 TABLET | Refills: 1 | Status: SHIPPED | OUTPATIENT
Start: 2024-07-31

## 2024-07-31 RX ORDER — AMLODIPINE BESYLATE 10 MG/1
10 TABLET ORAL DAILY
Qty: 90 TABLET | Refills: 1 | Status: SHIPPED | OUTPATIENT
Start: 2024-07-31

## 2024-07-31 RX ORDER — FLUTICASONE PROPIONATE 50 MCG
2 SPRAY, SUSPENSION (ML) NASAL DAILY
Qty: 3 EACH | Refills: 1 | Status: SHIPPED | OUTPATIENT
Start: 2024-07-31

## 2024-07-31 SDOH — ECONOMIC STABILITY: FOOD INSECURITY: WITHIN THE PAST 12 MONTHS, YOU WORRIED THAT YOUR FOOD WOULD RUN OUT BEFORE YOU GOT MONEY TO BUY MORE.: NEVER TRUE

## 2024-07-31 SDOH — ECONOMIC STABILITY: HOUSING INSECURITY
IN THE LAST 12 MONTHS, WAS THERE A TIME WHEN YOU DID NOT HAVE A STEADY PLACE TO SLEEP OR SLEPT IN A SHELTER (INCLUDING NOW)?: NO

## 2024-07-31 SDOH — ECONOMIC STABILITY: FOOD INSECURITY: WITHIN THE PAST 12 MONTHS, THE FOOD YOU BOUGHT JUST DIDN'T LAST AND YOU DIDN'T HAVE MONEY TO GET MORE.: NEVER TRUE

## 2024-07-31 SDOH — ECONOMIC STABILITY: INCOME INSECURITY: HOW HARD IS IT FOR YOU TO PAY FOR THE VERY BASICS LIKE FOOD, HOUSING, MEDICAL CARE, AND HEATING?: NOT HARD AT ALL

## 2024-07-31 ASSESSMENT — ENCOUNTER SYMPTOMS
DIARRHEA: 0
BACK PAIN: 1
VOMITING: 0

## 2024-07-31 ASSESSMENT — PATIENT HEALTH QUESTIONNAIRE - PHQ9
SUM OF ALL RESPONSES TO PHQ QUESTIONS 1-9: 0
SUM OF ALL RESPONSES TO PHQ QUESTIONS 1-9: 0
SUM OF ALL RESPONSES TO PHQ9 QUESTIONS 1 & 2: 0
SUM OF ALL RESPONSES TO PHQ QUESTIONS 1-9: 0
SUM OF ALL RESPONSES TO PHQ QUESTIONS 1-9: 0
2. FEELING DOWN, DEPRESSED OR HOPELESS: NOT AT ALL
1. LITTLE INTEREST OR PLEASURE IN DOING THINGS: NOT AT ALL

## 2024-07-31 NOTE — PROGRESS NOTES
mellitus with other specified complication, with long-term current use of insulin (HCC)                Plan:    Start back on flonase   Orders Placed This Encounter   Medications   • pantoprazole (PROTONIX) 40 MG tablet     Sig: Take 1 tablet by mouth daily     Dispense:  90 tablet     Refill:  1   • lisinopril (PRINIVIL;ZESTRIL) 20 MG tablet     Sig: Take 1 tablet by mouth daily     Dispense:  90 tablet     Refill:  1   • carvedilol (COREG) 6.25 MG tablet     Sig: Take 1 tablet by mouth daily     Dispense:  180 tablet     Refill:  1   • amLODIPine (NORVASC) 10 MG tablet     Sig: Take 1 tablet by mouth daily     Dispense:  90 tablet     Refill:  1   • fluticasone (FLONASE) 50 MCG/ACT nasal spray     Si sprays by Each Nostril route daily     Dispense:  3 each     Refill:  1      Continue norvasc and lisinopril and htcz and coreg for htn and continue lipitor for lipids and continue insulin per endocrine for diabetes  Fasting blood work next month --f/u after all testing done

## 2024-08-20 ENCOUNTER — HOSPITAL ENCOUNTER (OUTPATIENT)
Dept: ULTRASOUND IMAGING | Age: 79
Discharge: HOME | End: 2024-08-22
Payer: COMMERCIAL

## 2024-08-20 DIAGNOSIS — I10 PRIMARY HYPERTENSION: Chronic | ICD-10-CM

## 2024-08-20 DIAGNOSIS — R09.89 DECREASED PULSES IN FEET: ICD-10-CM

## 2024-08-20 DIAGNOSIS — E78.00 HYPERCHOLESTEREMIA: Chronic | ICD-10-CM

## 2024-08-20 LAB
ALBUMIN SERPL-MCNC: 4.2 G/DL (ref 3.5–4.6)
ALP SERPL-CCNC: 102 U/L (ref 35–104)
ALT SERPL-CCNC: 8 U/L (ref 0–41)
ANION GAP SERPL CALCULATED.3IONS-SCNC: 9 MEQ/L (ref 9–15)
AST SERPL-CCNC: 12 U/L (ref 0–40)
BASOPHILS # BLD: 0 K/UL (ref 0–0.2)
BASOPHILS NFR BLD: 0.6 %
BILIRUB SERPL-MCNC: <0.2 MG/DL (ref 0.2–0.7)
BUN SERPL-MCNC: 41 MG/DL (ref 8–23)
CALCIUM SERPL-MCNC: 9.2 MG/DL (ref 8.5–9.9)
CHLORIDE SERPL-SCNC: 107 MEQ/L (ref 95–107)
CHOLEST SERPL-MCNC: 179 MG/DL (ref 0–199)
CO2 SERPL-SCNC: 24 MEQ/L (ref 20–31)
CREAT SERPL-MCNC: 1.82 MG/DL (ref 0.7–1.2)
EOSINOPHIL # BLD: 0.2 K/UL (ref 0–0.7)
EOSINOPHIL NFR BLD: 3.5 %
ERYTHROCYTE [DISTWIDTH] IN BLOOD BY AUTOMATED COUNT: 13.3 % (ref 11.5–14.5)
GLOBULIN SER CALC-MCNC: 3.2 G/DL (ref 2.3–3.5)
GLUCOSE SERPL-MCNC: 109 MG/DL (ref 70–99)
HCT VFR BLD AUTO: 34.6 % (ref 42–52)
HDLC SERPL-MCNC: 36 MG/DL (ref 40–59)
HGB BLD-MCNC: 11.2 G/DL (ref 14–18)
LDLC SERPL CALC-MCNC: 89 MG/DL (ref 0–129)
LYMPHOCYTES # BLD: 1.4 K/UL (ref 1–4.8)
LYMPHOCYTES NFR BLD: 22.4 %
MCH RBC QN AUTO: 30.3 PG (ref 27–31.3)
MCHC RBC AUTO-ENTMCNC: 32.4 % (ref 33–37)
MCV RBC AUTO: 93.5 FL (ref 79–92.2)
MONOCYTES # BLD: 0.5 K/UL (ref 0.2–0.8)
MONOCYTES NFR BLD: 7.4 %
NEUTROPHILS # BLD: 4.1 K/UL (ref 1.4–6.5)
NEUTS SEG NFR BLD: 65.3 %
PLATELET # BLD AUTO: 174 K/UL (ref 130–400)
POTASSIUM SERPL-SCNC: 5.4 MEQ/L (ref 3.4–4.9)
PROT SERPL-MCNC: 7.4 G/DL (ref 6.3–8)
RBC # BLD AUTO: 3.7 M/UL (ref 4.7–6.1)
SODIUM SERPL-SCNC: 140 MEQ/L (ref 135–144)
TRIGL SERPL-MCNC: 271 MG/DL (ref 0–150)
WBC # BLD AUTO: 6.2 K/UL (ref 4.8–10.8)

## 2024-08-20 PROCEDURE — 93922 UPR/L XTREMITY ART 2 LEVELS: CPT | Performed by: INTERNAL MEDICINE

## 2024-08-20 PROCEDURE — 93922 UPR/L XTREMITY ART 2 LEVELS: CPT

## 2024-08-23 ENCOUNTER — PREP FOR PROCEDURE (OUTPATIENT)
Dept: GASTROENTEROLOGY | Age: 79
End: 2024-08-23

## 2024-08-23 ENCOUNTER — OFFICE VISIT (OUTPATIENT)
Dept: GASTROENTEROLOGY | Age: 79
End: 2024-08-23
Payer: COMMERCIAL

## 2024-08-23 VITALS
SYSTOLIC BLOOD PRESSURE: 136 MMHG | OXYGEN SATURATION: 99 % | HEIGHT: 65 IN | DIASTOLIC BLOOD PRESSURE: 65 MMHG | HEART RATE: 64 BPM | WEIGHT: 221 LBS | BODY MASS INDEX: 36.82 KG/M2

## 2024-08-23 DIAGNOSIS — R13.19 ESOPHAGEAL DYSPHAGIA: ICD-10-CM

## 2024-08-23 DIAGNOSIS — K21.9 GASTROESOPHAGEAL REFLUX DISEASE, UNSPECIFIED WHETHER ESOPHAGITIS PRESENT: Primary | ICD-10-CM

## 2024-08-23 PROCEDURE — 3078F DIAST BP <80 MM HG: CPT | Performed by: SPECIALIST

## 2024-08-23 PROCEDURE — G8417 CALC BMI ABV UP PARAM F/U: HCPCS | Performed by: SPECIALIST

## 2024-08-23 PROCEDURE — 1036F TOBACCO NON-USER: CPT | Performed by: SPECIALIST

## 2024-08-23 PROCEDURE — 1123F ACP DISCUSS/DSCN MKR DOCD: CPT | Performed by: SPECIALIST

## 2024-08-23 PROCEDURE — G8427 DOCREV CUR MEDS BY ELIG CLIN: HCPCS | Performed by: SPECIALIST

## 2024-08-23 PROCEDURE — 99203 OFFICE O/P NEW LOW 30 MIN: CPT | Performed by: SPECIALIST

## 2024-08-23 PROCEDURE — 3075F SYST BP GE 130 - 139MM HG: CPT | Performed by: SPECIALIST

## 2024-08-23 ASSESSMENT — ENCOUNTER SYMPTOMS
BLOOD IN STOOL: 0
NAUSEA: 0
ABDOMINAL PAIN: 0
GASTROINTESTINAL NEGATIVE: 1
ANAL BLEEDING: 0
RESPIRATORY NEGATIVE: 1
ABDOMINAL DISTENTION: 0
CONSTIPATION: 0
EYES NEGATIVE: 1
RECTAL PAIN: 0
VOMITING: 0
DIARRHEA: 0

## 2024-08-23 NOTE — PROGRESS NOTES
Gastroenterology Clinic Visit    Juan M Shultz  [id number]  Chief Complaint   Patient presents with    New Patient    Gastroesophageal Reflux       HPI: 78 y.o. male presents to the clinic with history of chronic GERD for many years, has been experiencing regurgitation and occasional dysphagia.  No history of GI bleeding.  No history of weight loss, does not take aspirin or NSAIDs, patient reports having nocturnal symptoms.  Social history does not smoke does not drink alcohol    Review of Systems   Constitutional: Negative.    HENT: Negative.     Eyes: Negative.    Respiratory: Negative.     Cardiovascular:         Hypertension   Gastrointestinal: Negative.  Negative for abdominal distention, abdominal pain, anal bleeding, blood in stool, constipation, diarrhea, nausea, rectal pain and vomiting.        Chronic GERD and occasional dysphagia   Endocrine:        Diabetes   Genitourinary: Negative.         History of CA prostate and also cancer of kidney   Musculoskeletal: Negative.    Neurological: Negative.    Psychiatric/Behavioral: Negative.          Past Medical History:   Diagnosis Date    Arthritis     Diabetic neuropathy (HCC)     Elevated PSA     Hyperlipidemia     Hypertension     Osteoarthritis     Prostate cancer (HCC) 2017    Type II diabetes mellitus, uncontrolled       Past Surgical History:   Procedure Laterality Date    CHOLECYSTECTOMY      CIRCUMCISION, NON-      OTHER SURGICAL HISTORY      gallstones removed    TONSILLECTOMY      ULTRASOUND PROSTATE/TRANSRECTAL N/A 2017    PROSTATE TRANSRECTAL ULTRASOUND BIOPSY performed by Maxwell Vega MD at [facility] OR     Current Outpatient Medications on File Prior to Visit   Medication Sig Dispense Refill    cetirizine (ZYRTEC) 10 MG tablet Take 1 tablet by mouth daily      pantoprazole (PROTONIX) 40 MG tablet Take 1 tablet by mouth daily 90 tablet 1    lisinopril (PRINIVIL;ZESTRIL) 20 MG tablet Take 1 tablet by mouth daily 90 tablet 1    carvedilol

## 2024-08-26 RX ORDER — SODIUM CHLORIDE 0.9 % (FLUSH) 0.9 %
5-40 SYRINGE (ML) INJECTION EVERY 12 HOURS SCHEDULED
Status: CANCELLED | OUTPATIENT
Start: 2024-08-26

## 2024-08-26 RX ORDER — SODIUM CHLORIDE 9 MG/ML
INJECTION, SOLUTION INTRAVENOUS CONTINUOUS
Status: CANCELLED | OUTPATIENT
Start: 2024-08-26

## 2024-08-26 RX ORDER — SODIUM CHLORIDE 9 MG/ML
INJECTION, SOLUTION INTRAVENOUS PRN
Status: CANCELLED | OUTPATIENT
Start: 2024-08-26

## 2024-08-26 RX ORDER — SODIUM CHLORIDE 0.9 % (FLUSH) 0.9 %
5-40 SYRINGE (ML) INJECTION PRN
Status: CANCELLED | OUTPATIENT
Start: 2024-08-26

## 2024-09-02 ENCOUNTER — ANESTHESIA EVENT (OUTPATIENT)
Dept: ENDOSCOPY | Age: 79
End: 2024-09-02
Payer: COMMERCIAL

## 2024-09-03 ENCOUNTER — ANESTHESIA (OUTPATIENT)
Dept: ENDOSCOPY | Age: 79
End: 2024-09-03
Payer: COMMERCIAL

## 2024-09-03 ENCOUNTER — OFFICE VISIT (OUTPATIENT)
Dept: FAMILY MEDICINE CLINIC | Age: 79
End: 2024-09-03
Payer: COMMERCIAL

## 2024-09-03 ENCOUNTER — HOSPITAL ENCOUNTER (OUTPATIENT)
Age: 79
Setting detail: OUTPATIENT SURGERY
Discharge: HOME | End: 2024-09-03
Attending: SPECIALIST | Admitting: SPECIALIST
Payer: COMMERCIAL

## 2024-09-03 VITALS
TEMPERATURE: 97.2 F | OXYGEN SATURATION: 98 % | DIASTOLIC BLOOD PRESSURE: 76 MMHG | HEART RATE: 64 BPM | RESPIRATION RATE: 16 BRPM | HEIGHT: 65 IN | BODY MASS INDEX: 36.82 KG/M2 | SYSTOLIC BLOOD PRESSURE: 148 MMHG | WEIGHT: 221 LBS

## 2024-09-03 VITALS
HEART RATE: 68 BPM | BODY MASS INDEX: 36.82 KG/M2 | SYSTOLIC BLOOD PRESSURE: 120 MMHG | DIASTOLIC BLOOD PRESSURE: 60 MMHG | WEIGHT: 221 LBS | OXYGEN SATURATION: 98 % | TEMPERATURE: 97.6 F | HEIGHT: 65 IN

## 2024-09-03 DIAGNOSIS — J30.9 ALLERGIC RHINITIS, UNSPECIFIED SEASONALITY, UNSPECIFIED TRIGGER: ICD-10-CM

## 2024-09-03 DIAGNOSIS — I10 HYPERTENSION, UNSPECIFIED TYPE: ICD-10-CM

## 2024-09-03 DIAGNOSIS — E11.69 TYPE 2 DIABETES MELLITUS WITH OTHER SPECIFIED COMPLICATION, WITH LONG-TERM CURRENT USE OF INSULIN (HCC): ICD-10-CM

## 2024-09-03 DIAGNOSIS — E78.00 HYPERCHOLESTEREMIA: ICD-10-CM

## 2024-09-03 DIAGNOSIS — K21.9 GASTROESOPHAGEAL REFLUX DISEASE WITHOUT ESOPHAGITIS: ICD-10-CM

## 2024-09-03 DIAGNOSIS — Z79.4 TYPE 2 DIABETES MELLITUS WITH OTHER SPECIFIED COMPLICATION, WITH LONG-TERM CURRENT USE OF INSULIN (HCC): ICD-10-CM

## 2024-09-03 DIAGNOSIS — Z00.00 INITIAL MEDICARE ANNUAL WELLNESS VISIT: Primary | ICD-10-CM

## 2024-09-03 DIAGNOSIS — N18.30 STAGE 3 CHRONIC KIDNEY DISEASE, UNSPECIFIED WHETHER STAGE 3A OR 3B CKD (HCC): ICD-10-CM

## 2024-09-03 DIAGNOSIS — I10 HYPERTENSION, UNSPECIFIED TYPE: Primary | Chronic | ICD-10-CM

## 2024-09-03 DIAGNOSIS — I10 PRIMARY HYPERTENSION: ICD-10-CM

## 2024-09-03 LAB
GLUCOSE BLD-MCNC: 117 MG/DL (ref 70–99)
PERFORMED ON: ABNORMAL

## 2024-09-03 PROCEDURE — 2709999900 HC NON-CHARGEABLE SUPPLY: Performed by: SPECIALIST

## 2024-09-03 PROCEDURE — 7100000011 HC PHASE II RECOVERY - ADDTL 15 MIN: Performed by: SPECIALIST

## 2024-09-03 PROCEDURE — 1036F TOBACCO NON-USER: CPT | Performed by: FAMILY MEDICINE

## 2024-09-03 PROCEDURE — 43235 EGD DIAGNOSTIC BRUSH WASH: CPT | Performed by: SPECIALIST

## 2024-09-03 PROCEDURE — G0438 PPPS, INITIAL VISIT: HCPCS | Performed by: FAMILY MEDICINE

## 2024-09-03 PROCEDURE — 3074F SYST BP LT 130 MM HG: CPT | Performed by: FAMILY MEDICINE

## 2024-09-03 PROCEDURE — 3700000000 HC ANESTHESIA ATTENDED CARE: Performed by: SPECIALIST

## 2024-09-03 PROCEDURE — 7100000010 HC PHASE II RECOVERY - FIRST 15 MIN: Performed by: SPECIALIST

## 2024-09-03 PROCEDURE — 3700000001 HC ADD 15 MINUTES (ANESTHESIA): Performed by: SPECIALIST

## 2024-09-03 PROCEDURE — G8427 DOCREV CUR MEDS BY ELIG CLIN: HCPCS | Performed by: FAMILY MEDICINE

## 2024-09-03 PROCEDURE — 3078F DIAST BP <80 MM HG: CPT | Performed by: FAMILY MEDICINE

## 2024-09-03 PROCEDURE — 3609017100 HC EGD: Performed by: SPECIALIST

## 2024-09-03 PROCEDURE — 99213 OFFICE O/P EST LOW 20 MIN: CPT | Performed by: FAMILY MEDICINE

## 2024-09-03 PROCEDURE — 1123F ACP DISCUSS/DSCN MKR DOCD: CPT | Performed by: FAMILY MEDICINE

## 2024-09-03 PROCEDURE — 6360000002 HC RX W HCPCS: Performed by: NURSE ANESTHETIST, CERTIFIED REGISTERED

## 2024-09-03 PROCEDURE — 2580000003 HC RX 258: Performed by: SPECIALIST

## 2024-09-03 PROCEDURE — 2500000003 HC RX 250 WO HCPCS: Performed by: NURSE ANESTHETIST, CERTIFIED REGISTERED

## 2024-09-03 PROCEDURE — G8417 CALC BMI ABV UP PARAM F/U: HCPCS | Performed by: FAMILY MEDICINE

## 2024-09-03 PROCEDURE — 3052F HG A1C>EQUAL 8.0%<EQUAL 9.0%: CPT | Performed by: FAMILY MEDICINE

## 2024-09-03 RX ORDER — LIDOCAINE HYDROCHLORIDE 20 MG/ML
INJECTION, SOLUTION INFILTRATION; PERINEURAL PRN
Status: DISCONTINUED | OUTPATIENT
Start: 2024-09-03 | End: 2024-09-03 | Stop reason: SDUPTHER

## 2024-09-03 RX ORDER — CARVEDILOL 6.25 MG/1
6.5 TABLET ORAL DAILY
Qty: 180 TABLET | Refills: 1 | Status: SHIPPED | OUTPATIENT
Start: 2024-09-03

## 2024-09-03 RX ORDER — SODIUM CHLORIDE 0.9 % (FLUSH) 0.9 %
5-40 SYRINGE (ML) INJECTION PRN
Status: DISCONTINUED | OUTPATIENT
Start: 2024-09-03 | End: 2024-09-03 | Stop reason: HOSPADM

## 2024-09-03 RX ORDER — ACYCLOVIR 400 MG/1
TABLET ORAL
Status: CANCELLED | OUTPATIENT
Start: 2024-09-03

## 2024-09-03 RX ORDER — PROPOFOL 10 MG/ML
INJECTION, EMULSION INTRAVENOUS PRN
Status: DISCONTINUED | OUTPATIENT
Start: 2024-09-03 | End: 2024-09-03 | Stop reason: SDUPTHER

## 2024-09-03 RX ORDER — SODIUM CHLORIDE 0.9 % (FLUSH) 0.9 %
5-40 SYRINGE (ML) INJECTION EVERY 12 HOURS SCHEDULED
Status: DISCONTINUED | OUTPATIENT
Start: 2024-09-03 | End: 2024-09-03 | Stop reason: HOSPADM

## 2024-09-03 RX ORDER — HYDROCHLOROTHIAZIDE 25 MG/1
25 TABLET ORAL DAILY
Qty: 90 TABLET | Refills: 1 | Status: SHIPPED | OUTPATIENT
Start: 2024-09-03

## 2024-09-03 RX ORDER — SIMVASTATIN 20 MG
20 TABLET ORAL EVERY EVENING
Qty: 90 TABLET | Refills: 1 | Status: SHIPPED | OUTPATIENT
Start: 2024-09-03 | End: 2024-12-02

## 2024-09-03 RX ORDER — SODIUM CHLORIDE 9 MG/ML
INJECTION, SOLUTION INTRAVENOUS
Status: DISCONTINUED
Start: 2024-09-03 | End: 2024-09-03 | Stop reason: HOSPADM

## 2024-09-03 RX ORDER — SODIUM CHLORIDE 9 MG/ML
INJECTION, SOLUTION INTRAVENOUS PRN
Status: DISCONTINUED | OUTPATIENT
Start: 2024-09-03 | End: 2024-09-03 | Stop reason: HOSPADM

## 2024-09-03 RX ORDER — GABAPENTIN 300 MG/1
CAPSULE ORAL
Qty: 180 CAPSULE | Refills: 1 | Status: CANCELLED | OUTPATIENT
Start: 2024-09-03 | End: 2025-01-26

## 2024-09-03 RX ORDER — SODIUM CHLORIDE 9 MG/ML
INJECTION, SOLUTION INTRAVENOUS CONTINUOUS
Status: DISCONTINUED | OUTPATIENT
Start: 2024-09-03 | End: 2024-09-03 | Stop reason: HOSPADM

## 2024-09-03 RX ORDER — LISINOPRIL 20 MG/1
20 TABLET ORAL DAILY
Qty: 90 TABLET | Refills: 1 | Status: SHIPPED | OUTPATIENT
Start: 2024-09-03

## 2024-09-03 RX ORDER — PANTOPRAZOLE SODIUM 40 MG/1
40 TABLET, DELAYED RELEASE ORAL DAILY
Qty: 90 TABLET | Refills: 1 | Status: SHIPPED | OUTPATIENT
Start: 2024-09-03

## 2024-09-03 RX ADMIN — PROPOFOL 100 MG: 10 INJECTION, EMULSION INTRAVENOUS at 09:59

## 2024-09-03 RX ADMIN — PROPOFOL 50 MG: 10 INJECTION, EMULSION INTRAVENOUS at 10:00

## 2024-09-03 RX ADMIN — LIDOCAINE HYDROCHLORIDE 50 MG: 20 INJECTION, SOLUTION INFILTRATION; PERINEURAL at 09:59

## 2024-09-03 RX ADMIN — PROPOFOL 25 MG: 10 INJECTION, EMULSION INTRAVENOUS at 10:03

## 2024-09-03 RX ADMIN — SODIUM CHLORIDE: 9 INJECTION, SOLUTION INTRAVENOUS at 09:48

## 2024-09-03 RX ADMIN — PROPOFOL 50 MG: 10 INJECTION, EMULSION INTRAVENOUS at 10:02

## 2024-09-03 ASSESSMENT — PATIENT HEALTH QUESTIONNAIRE - PHQ9
2. FEELING DOWN, DEPRESSED OR HOPELESS: NOT AT ALL
SUM OF ALL RESPONSES TO PHQ QUESTIONS 1-9: 0
SUM OF ALL RESPONSES TO PHQ QUESTIONS 1-9: 0
SUM OF ALL RESPONSES TO PHQ9 QUESTIONS 1 & 2: 0
1. LITTLE INTEREST OR PLEASURE IN DOING THINGS: NOT AT ALL
SUM OF ALL RESPONSES TO PHQ QUESTIONS 1-9: 0
SUM OF ALL RESPONSES TO PHQ QUESTIONS 1-9: 0

## 2024-09-03 ASSESSMENT — ENCOUNTER SYMPTOMS: COUGH: 0

## 2024-09-03 ASSESSMENT — PAIN - FUNCTIONAL ASSESSMENT: PAIN_FUNCTIONAL_ASSESSMENT: 0-10

## 2024-09-03 NOTE — PROGRESS NOTES
Medicare Annual Wellness Visit    Juan M Shultz is here for Medicare AWV    Assessment & Plan     Recommendations for Preventive Services Due: see orders and patient instructions/AVS.  Recommended screening schedule for the next 5-10 years is provided to the patient in written form: see Patient Instructions/AVS.      Diagnosis Orders   1. Initial Medicare annual wellness visit        2. Hypertension, unspecified type        3. Primary hypertension        4. Hypercholesteremia        5. Gastroesophageal reflux disease without esophagitis        6. Allergic rhinitis, unspecified seasonality, unspecified trigger        7. Type 2 diabetes mellitus with other specified complication, with long-term current use of insulin (HCC)        8. Stage 3 chronic kidney disease, unspecified whether stage 3a or 3b CKD (HCC)           Orders Placed This Encounter   Procedures    [facility] Referral to [facility] Clinical Specialist     Referral Priority:   Routine     Referral Type:   Other     Referral Reason:   Specialty Services Required     Number of Visits Requested:   1         Subjective       Patient's complete Health Risk Assessment and screening values have been reviewed and are found in Flowsheets. The following problems were reviewed today and where indicated follow up appointments were made and/or referrals ordered.    Positive Risk Factor Screenings with Interventions:                Inactivity:  On average, how many days per week do you engage in moderate to strenuous exercise (like a brisk walk)?: 0 days (!) Abnormal  On average, how many minutes do you engage in exercise at this level?: 0 min    Interventions:  Patient comments: patient will look into pool      Abnormal BMI (obese):  There is no height or weight on file to calculate BMI. (!) Abnormal    Interventions:  Patient comments: patient to look at watching diet             ADL's:   Patient reports needing help with:  Select all that apply: (!)

## 2024-09-03 NOTE — PROGRESS NOTES
Subjective:      Patient ID: Juan M Shultz is a 78 y.o. male    HPI  Here in follow up from recent blood work and pvr.  Did get egd done thru gi-no significant problems.  Having some dysphagia few times per month currently    Review of Systems   Constitutional:  Negative for chills and fever.   Respiratory:  Negative for cough.    Skin:  Negative for rash.   Neurological:  Negative for weakness.     Reviewed allergy, medical, social, surgical, family and med list changes and updated   Files--reviewed blood work with chronic anemia and ckd and minimally elevated k and pvr with mild narrowing;       Social History     Socioeconomic History    Marital status:      Spouse name: None    Number of children: None    Years of education: None    Highest education level: None   Tobacco Use    Smoking status: Former     Current packs/day: 0.00     Types: Cigarettes     Quit date: 1/3/2017     Years since quittin.6    Smokeless tobacco: Never    Tobacco comments:     couple cigarettes a day   Vaping Use    Vaping status: Never Used   Substance and Sexual Activity    Alcohol use: No     Comment: rarely- 1 beer monthly    Drug use: No    Sexual activity: Never     Social Determinants of Health     Financial Resource Strain: Low Risk  (2024)    Overall Financial Resource Strain (CARDIA)     Difficulty of Paying Living Expenses: Not hard at all   Food Insecurity: No Food Insecurity (2024)    Hunger Vital Sign     Worried About Running Out of Food in the Last Year: Never true     Ran Out of Food in the Last Year: Never true   Transportation Needs: Unknown (2024)    PRAPARE - Transportation     Lack of Transportation (Non-Medical): No   Recent Concern: Transportation Needs - Unmet Transportation Needs (2024)    PRAPARE - Transportation     Lack of Transportation (Non-Medical): Yes   Physical Activity: Inactive (9/3/2024)    Exercise Vital Sign     Days of Exercise per Week: 0 days     Minutes of

## 2024-09-03 NOTE — ANESTHESIA PRE PROCEDURE
HCG (If Applicable): No results found for: \"PREGTESTUR\", \"PREGSERUM\", \"HCG\", \"HCGQUANT\"     ABGs: No results found for: \"PHART\", \"PO2ART\", \"OMF2RSF\", \"YFJ6YTG\", \"BEART\", \"M6JPQBXR\"     Type & Screen (If Applicable):  No results found for: \"LABABO\"    Drug/Infectious Status (If Applicable):  No results found for: \"HIV\", \"HEPCAB\"    COVID-19 Screening (If Applicable): No results found for: \"COVID19\"        Anesthesia Evaluation  Patient summary reviewed and Nursing notes reviewed   no history of anesthetic complications:   Airway: Mallampati: II       Mouth opening: > = 3 FB   Dental:          Pulmonary:Negative Pulmonary ROS and normal exam                               Cardiovascular:    (+) hypertension:                  Neuro/Psych:   Negative Neuro/Psych ROS              GI/Hepatic/Renal:   (+) GERD:, renal disease:          Endo/Other:    (+) DiabetesType II DM.                 Abdominal:   (+) obese          Vascular: negative vascular ROS.         Other Findings:             Anesthesia Plan      general     ASA 3       Induction: intravenous.      Anesthetic plan and risks discussed with patient.      Plan discussed with surgical team.                    Mena Darby, APRN - CRNA   9/3/2024

## 2024-09-03 NOTE — H&P
Patient Name: Juan M Shultz  : 1945  MRN: [id number]  DATE: 24      ENDOSCOPY  History and Physical    Procedure:    [] Diagnostic Colonoscopy       [] Screening Colonoscopy  [x] EGD      [] ERCP      [] EUS       [] Other    [x] Previous office notes/History and Physical reviewed from the patients chart. Please see EMR for further details of HPI. I have examined the patient's status immediately prior to the procedure and:      Indications/HPI:    []Abdominal Pain  []Cancer- GI/Lung  []Fhx of colon CA/polyps  []History of Polyps  []Mccollum’s   []Melena  []Abnormal Imaging  []Dysphagia    []Persistent Pneumonia  []Anemia  []Food Impaction  []History of Polyps  []GI Bleed  []Pulmonary nodule/Mass  []Change in bowel habits []Heartburn/Reflux  []Rectal Bleed (BRBPR)  []Chest Pain - Non Cardiac []Heme (+) Stoo  l[]Ulcers  []Constipation  []Hemoptysis   []Varices  []Diarrhea  []Hypoxemia  []Nausea/Vomiting  []Screening   []Crohns/Colitis  []Other: History of GERD and occasional dysphagia    Anesthesia:   [x] MAC [] Moderate Sedation   [] General   [] None     ROS: 12 pt Review of Symptoms was negative unless mentioned above    Medications:   Prior to Admission medications    Medication Sig Start Date End Date Taking? Authorizing Provider   cetirizine (ZYRTEC) 10 MG tablet Take 1 tablet by mouth daily    ProviderJeff MD   pantoprazole (PROTONIX) 40 MG tablet Take 1 tablet by mouth daily 24   Jewel Barraza MD   lisinopril (PRINIVIL;ZESTRIL) 20 MG tablet Take 1 tablet by mouth daily 24   Jewel Barraza MD   carvedilol (COREG) 6.25 MG tablet Take 1 tablet by mouth daily 24   Jewel Barraza MD   amLODIPine (NORVASC) 10 MG tablet Take 1 tablet by mouth daily 24   Jewel Barraza MD   fluticasone (FLONASE) 50 MCG/ACT nasal spray 2 sprays by Each Nostril route daily 24   Jewel Barraza MD   B-D UF III MINI PEN NEEDLES 31G X 5 MM MISC USE TWICE DAILY 24   Anderson, 
will take at MD office

## 2024-09-03 NOTE — PATIENT INSTRUCTIONS
professional. [employer] [facility], [facility] disclaims any warranty or liability for your use of this information.      Personalized Preventive Plan for Juan M Shultz - 9/3/2024  Medicare offers a range of preventive health benefits. Some of the tests and screenings are paid in full while other may be subject to a deductible, co-insurance, and/or copay.    Some of these benefits include a comprehensive review of your medical history including lifestyle, illnesses that may run in your family, and various assessments and screenings as appropriate.    After reviewing your medical record and screening and assessments performed today your provider may have ordered immunizations, labs, imaging, and/or referrals for you.  A list of these orders (if applicable) as well as your Preventive Care list are included within your After Visit Summary for your review.    Other Preventive Recommendations:    A preventive eye exam performed by an eye specialist is recommended every 1-2 years to screen for glaucoma; cataracts, macular degeneration, and other eye disorders.  A preventive dental visit is recommended every 6 months.  Try to get at least 150 minutes of exercise per week or 10,000 steps per day on a pedometer .  Order or download the FREE \"Exercise & Physical Activity: Your Everyday Guide\" from [employer]. Call 1-317.827.3500 or search [employer] online.  You need 0138-3357 mg of calcium and 3331-8879 IU of vitamin D per day. It is possible to meet your calcium requirement with diet alone, but a vitamin D supplement is usually necessary to meet this goal.  When exposed to the sun, use a sunscreen that protects against both UVA and UVB radiation with an SPF of 30 or greater. Reapply every 2 to 3 hours or after sweating, drying off with a towel, or swimming.  Always wear a seat belt when traveling in a car. Always wear a helmet when riding a bicycle or motorcycle.

## 2024-09-03 NOTE — ANESTHESIA POSTPROCEDURE EVALUATION
Department of Anesthesiology  Postprocedure Note    Patient: Juan M Shultz  MRN: [id number]  YOB: 1945  Date of evaluation: 9/3/2024    Procedure Summary       Date: 09/03/24 Room / Location: [facility] OR 01 / [facility]    Anesthesia Start: 0950 Anesthesia Stop: 1012    Procedure: ESOPHAGOGASTRODUODENOSCOPY Diagnosis:       GERD (gastroesophageal reflux disease)      Esophageal dysphagia      (GERD (gastroesophageal reflux disease) [K21.9])      (Esophageal dysphagia [R13.19])    Surgeons: Martha Moore MD Responsible Provider: Mena Darby APRN - CRNA    Anesthesia Type: general ASA Status: 3            Anesthesia Type: No value filed.    Sen Phase I: Sen Score: 10    Sen Phase II:      Anesthesia Post Evaluation    Patient location during evaluation: bedside  Patient participation: complete - patient participated  Level of consciousness: awake and awake and alert  Airway patency: patent  Nausea & Vomiting: no nausea and no vomiting  Cardiovascular status: blood pressure returned to baseline and hemodynamically stable  Respiratory status: acceptable  Hydration status: euvolemic  Pain management: adequate        No notable events documented.

## 2024-09-04 ENCOUNTER — CLINICAL DOCUMENTATION (OUTPATIENT)
Dept: SPIRITUAL SERVICES | Age: 79
End: 2024-09-04

## 2024-09-04 NOTE — ACP (ADVANCE CARE PLANNING)
Advance Care Planning   Ambulatory ACP Specialist Patient Outreach    Date:  9/4/2024    ACP Specialist:  Pam Singh LPN    Outreach call to patient in follow-up to ACP Specialist referral from:Jewel Barraza MD    [x] PCP  [] Provider   [] Ambulatory Care Management [] Other     For:                  [x] Advance Directive Assistance              [] Complete Portable DNR order              [] Complete POST/POLST/MOST              [] Code Status Discussion             [] Discuss Goals of Care             [] Early ACP Decision-Making              [] Other (Specify)    Date Referral Received:9/3/24    Next Step:   [x] ACP scheduled conversation  [] Outreach again in one week               [x]Mail ACP Info Sheets  [x] Mail Advance Directive   [] Closing referral.  Routing closure to referring provider/staff and to ACP Specialist .    [] Closure letter mailed to patient with invitation to contact ACP Specialist if / when ready.   [] Other (Specify here):       [x] At this time, Healthcare Decision Maker Is:     Primary Decision Maker: Nicolas Shultzia - Child - [phone]          [] Primary agent named in scanned advance directive.    [x] Legal Next of Kin.     [] Unable to determine legal decision maker at this time.    Outreaches:       [x] 1st -  Date:  9/4/24               Intervention:  [x] Spoke with Patient   [] Left Voice mail [] Email / Mail    [] [employer]  [] Other (Specify) :     Outcomes:Writer contacted patient on preferred phone([phone]) to discuss ACP.  Patient is agreeable to a conversation with ACP Specialist Alyson Escobedo on Wednesday, September 18, 2024 at 4:00.  A copy of [place] [employer] forms and ACP information sheets sent to patient's home address on file with ACP Specialist and Coordinator's contact information included.       Thank you for this referral.

## 2024-09-18 ENCOUNTER — CLINICAL DOCUMENTATION (OUTPATIENT)
Dept: SPIRITUAL SERVICES | Age: 79
End: 2024-09-18

## 2024-09-18 DIAGNOSIS — E11.69 TYPE 2 DIABETES MELLITUS WITH OTHER SPECIFIED COMPLICATION, WITH LONG-TERM CURRENT USE OF INSULIN (HCC): ICD-10-CM

## 2024-09-18 DIAGNOSIS — Z79.4 TYPE 2 DIABETES MELLITUS WITH OTHER SPECIFIED COMPLICATION, WITH LONG-TERM CURRENT USE OF INSULIN (HCC): ICD-10-CM

## 2024-09-18 RX ORDER — INSULIN LISPRO 100 [IU]/ML
INJECTION, SUSPENSION SUBCUTANEOUS
Qty: 45 ML | Refills: 3 | Status: SHIPPED | OUTPATIENT
Start: 2024-09-18

## 2024-10-10 ENCOUNTER — OFFICE VISIT (OUTPATIENT)
Dept: ENDOCRINOLOGY | Age: 79
End: 2024-10-10
Payer: COMMERCIAL

## 2024-10-10 VITALS
WEIGHT: 222 LBS | DIASTOLIC BLOOD PRESSURE: 65 MMHG | HEART RATE: 65 BPM | BODY MASS INDEX: 36.99 KG/M2 | HEIGHT: 65 IN | SYSTOLIC BLOOD PRESSURE: 130 MMHG | OXYGEN SATURATION: 95 %

## 2024-10-10 DIAGNOSIS — E11.69 TYPE 2 DIABETES MELLITUS WITH OTHER SPECIFIED COMPLICATION, WITH LONG-TERM CURRENT USE OF INSULIN (HCC): Primary | ICD-10-CM

## 2024-10-10 DIAGNOSIS — Z79.4 TYPE 2 DIABETES MELLITUS WITH OTHER SPECIFIED COMPLICATION, WITH LONG-TERM CURRENT USE OF INSULIN (HCC): Primary | ICD-10-CM

## 2024-10-10 LAB
CHP ED QC CHECK: NORMAL
GLUCOSE BLD-MCNC: 222 MG/DL
HBA1C MFR BLD: 7.8 %

## 2024-10-10 PROCEDURE — 83036 HEMOGLOBIN GLYCOSYLATED A1C: CPT | Performed by: INTERNAL MEDICINE

## 2024-10-10 PROCEDURE — 1036F TOBACCO NON-USER: CPT | Performed by: INTERNAL MEDICINE

## 2024-10-10 PROCEDURE — 82962 GLUCOSE BLOOD TEST: CPT | Performed by: INTERNAL MEDICINE

## 2024-10-10 PROCEDURE — G8427 DOCREV CUR MEDS BY ELIG CLIN: HCPCS | Performed by: INTERNAL MEDICINE

## 2024-10-10 PROCEDURE — 3075F SYST BP GE 130 - 139MM HG: CPT | Performed by: INTERNAL MEDICINE

## 2024-10-10 PROCEDURE — G8484 FLU IMMUNIZE NO ADMIN: HCPCS | Performed by: INTERNAL MEDICINE

## 2024-10-10 PROCEDURE — 3078F DIAST BP <80 MM HG: CPT | Performed by: INTERNAL MEDICINE

## 2024-10-10 PROCEDURE — 3051F HG A1C>EQUAL 7.0%<8.0%: CPT | Performed by: INTERNAL MEDICINE

## 2024-10-10 PROCEDURE — 1123F ACP DISCUSS/DSCN MKR DOCD: CPT | Performed by: INTERNAL MEDICINE

## 2024-10-10 PROCEDURE — G8417 CALC BMI ABV UP PARAM F/U: HCPCS | Performed by: INTERNAL MEDICINE

## 2024-10-10 PROCEDURE — 99213 OFFICE O/P EST LOW 20 MIN: CPT | Performed by: INTERNAL MEDICINE

## 2024-10-10 RX ORDER — FLURBIPROFEN SODIUM 0.3 MG/ML
SOLUTION/ DROPS OPHTHALMIC
Qty: 100 EACH | Refills: 5 | Status: SHIPPED | OUTPATIENT
Start: 2024-10-10

## 2024-10-10 RX ORDER — BLOOD SUGAR DIAGNOSTIC
STRIP MISCELLANEOUS
Qty: 200 STRIP | Refills: 3 | Status: SHIPPED | OUTPATIENT
Start: 2024-10-10

## 2024-10-10 RX ORDER — GABAPENTIN 300 MG/1
CAPSULE ORAL
Qty: 180 CAPSULE | Refills: 1 | Status: SHIPPED | OUTPATIENT
Start: 2024-10-10 | End: 2025-03-04

## 2024-10-10 NOTE — PROGRESS NOTES
10/10/2024    Assessment:       Diagnosis Orders   1. Type 2 diabetes mellitus with other specified complication, with long-term current use of insulin (HCC)  POCT Glucose    POCT glycosylated hemoglobin (Hb A1C)            PLAN:     Orders Placed This Encounter   Procedures    Hemoglobin A1C     Standing Status:   Future     Standing Expiration Date:   10/10/2025    Basic Metabolic Panel     Standing Status:   Future     Standing Expiration Date:   10/10/2025    POCT Glucose    POCT glycosylated hemoglobin (Hb A1C)     Orders Placed This Encounter   Medications    gabapentin (NEURONTIN) 300 MG capsule     Sig: TAKE 1 CAPSULE BY MOUTH TWICE DAILY     Dispense:  180 capsule     Refill:  1    blood glucose test strips (ONETOUCH ULTRA) strip     Sig: Test 3x daily e11.65     Dispense:  200 strip     Refill:  3     **Patient requests 90 days supply**ZERO refills remain on this prescription. Your patient is requesting advance approval of refills for this medication to PREVENT ANY MISSED DOSES    Insulin Pen Needle (B-D UF III MINI PEN NEEDLES) 31G X 5 MM MISC     Sig: USE TWICE DAILY     Dispense:  100 each     Refill:  5     Continue current insulin regimen patient to follow-up in 3 to 6 months time      Orders Placed This Encounter   Procedures    POCT Glucose    POCT glycosylated hemoglobin (Hb A1C)     No orders of the defined types were placed in this encounter.    No follow-ups on file.  Subjective:     Chief Complaint   Patient presents with    Diabetes    Hypertension     Vitals:    10/10/24 1616   BP: 130/65   Pulse: 65   SpO2: 95%   Weight: 100.7 kg (222 lb)   Height: 1.651 m (5' 5\")     Wt Readings from Last 3 Encounters:   10/10/24 100.7 kg (222 lb)   09/03/24 100.2 kg (221 lb)   09/03/24 100.2 kg (221 lb)     BP Readings from Last 3 Encounters:   10/10/24 130/65   09/03/24 120/60   09/03/24 (!) 148/76       Follow-up on type 2 diabetes obesity history of chronic kidney disease on 75/25 Humalog 25 units twice

## 2024-10-19 ENCOUNTER — TELEPHONE (OUTPATIENT)
Dept: FAMILY MEDICINE CLINIC | Age: 79
End: 2024-10-19

## 2024-10-19 DIAGNOSIS — Z79.4 TYPE 2 DIABETES MELLITUS WITH OTHER SPECIFIED COMPLICATION, WITH LONG-TERM CURRENT USE OF INSULIN (HCC): Primary | ICD-10-CM

## 2024-10-19 DIAGNOSIS — E11.69 TYPE 2 DIABETES MELLITUS WITH OTHER SPECIFIED COMPLICATION, WITH LONG-TERM CURRENT USE OF INSULIN (HCC): Primary | ICD-10-CM

## 2024-10-19 RX ORDER — INSULIN LISPRO 100 [IU]/ML
INJECTION, SUSPENSION SUBCUTANEOUS
Qty: 45 ML | Refills: 3 | Status: SHIPPED | OUTPATIENT
Start: 2024-10-19

## 2024-12-26 RX ORDER — AMLODIPINE BESYLATE 10 MG/1
10 TABLET ORAL DAILY
Qty: 90 TABLET | Refills: 1 | Status: SHIPPED | OUTPATIENT
Start: 2024-12-26

## 2024-12-26 NOTE — TELEPHONE ENCOUNTER
Pharmacy requesting medication refill. Please approve or deny this request.    Rx requested:  Requested Prescriptions     Pending Prescriptions Disp Refills    amLODIPine (NORVASC) 10 MG tablet [Pharmacy Med Name: AMLODIPINE BESYLATE 10MG TABLETS] 90 tablet 1     Sig: TAKE 1 TABLET BY MOUTH DAILY         Last Office Visit:   9/3/2024      Next Visit Date:  Future Appointments   Date Time Provider Department Center   3/3/2025  4:00 PM Jewel Barraza MD [facility]   4/17/2025  4:30 PM Lew Barron MD [facility]

## 2025-01-30 DIAGNOSIS — E11.69 TYPE 2 DIABETES MELLITUS WITH OTHER SPECIFIED COMPLICATION, WITH LONG-TERM CURRENT USE OF INSULIN (HCC): ICD-10-CM

## 2025-01-30 DIAGNOSIS — Z79.4 TYPE 2 DIABETES MELLITUS WITH OTHER SPECIFIED COMPLICATION, WITH LONG-TERM CURRENT USE OF INSULIN (HCC): ICD-10-CM

## 2025-01-30 RX ORDER — INSULIN LISPRO 100 [IU]/ML
INJECTION, SUSPENSION SUBCUTANEOUS
Qty: 45 ML | Refills: 3 | Status: SHIPPED | OUTPATIENT
Start: 2025-01-30

## 2025-01-30 NOTE — TELEPHONE ENCOUNTER
Requested Prescriptions     Pending Prescriptions Disp Refills    insulin lispro protamine & lispro (HUMALOG MIX 75/25 KWIKPEN) (75-25) 100 UNIT per ML SUPN injection pen 45 mL 3     Sig: INJECT 25 UNITS SUBCUTANEOUSLY EVERY MORNING AND INJECT 25 UNITS SUBCUTANEOUSLY EVERY EVENING WITH DINNER

## 2025-02-03 RX ORDER — FLUTICASONE PROPIONATE 50 MCG
2 SPRAY, SUSPENSION (ML) NASAL DAILY
Qty: 48 G | Refills: 1 | Status: SHIPPED | OUTPATIENT
Start: 2025-02-03

## 2025-02-08 DIAGNOSIS — I10 HYPERTENSION, UNSPECIFIED TYPE: Chronic | ICD-10-CM

## 2025-02-08 NOTE — TELEPHONE ENCOUNTER
Future Appointments    Encounter Information   Provider Department Appt Notes   3/3/2025 Jewel Barraza MD [facility] 6 mo f/u   4/17/2025 Lew Barron MD [facility] 6 mo     Past Visits    Date Provider Specialty Visit Type Primary Dx   10/10/2024 Lew Barron MD Endocrinology Office Visit Type 2 diabetes mellitus with other specified complication, with long-term current use of insulin (HCC)   09/03/2024 Jewel Barraza MD Family Medicine Office Visit Initial Medicare annual wellness visit   09/03/2024 Martha Moore MD Endoscopy Surgery    09/03/2024 Jewel Barraza MD Family Medicine Office Visit Hypertension, unspecified type   08/23/2024 Martha Moore MD Gastroenterology Office Visit Gastroesophageal reflux disease, unspecified whether esophagitis present

## 2025-02-09 RX ORDER — SIMVASTATIN 20 MG
20 TABLET ORAL EVERY EVENING
Qty: 90 TABLET | Refills: 0 | Status: SHIPPED | OUTPATIENT
Start: 2025-02-09 | End: 2025-05-10

## 2025-03-03 ENCOUNTER — OFFICE VISIT (OUTPATIENT)
Age: 80
End: 2025-03-03
Payer: COMMERCIAL

## 2025-03-03 VITALS
WEIGHT: 222.2 LBS | SYSTOLIC BLOOD PRESSURE: 130 MMHG | DIASTOLIC BLOOD PRESSURE: 70 MMHG | OXYGEN SATURATION: 98 % | HEART RATE: 74 BPM | TEMPERATURE: 97.3 F | BODY MASS INDEX: 37.02 KG/M2 | HEIGHT: 65 IN

## 2025-03-03 DIAGNOSIS — I10 HYPERTENSION, UNSPECIFIED TYPE: Primary | Chronic | ICD-10-CM

## 2025-03-03 DIAGNOSIS — E78.00 HYPERCHOLESTEREMIA: ICD-10-CM

## 2025-03-03 DIAGNOSIS — B35.1 ONYCHOMYCOSIS: ICD-10-CM

## 2025-03-03 PROCEDURE — 1160F RVW MEDS BY RX/DR IN RCRD: CPT | Performed by: FAMILY MEDICINE

## 2025-03-03 PROCEDURE — 1123F ACP DISCUSS/DSCN MKR DOCD: CPT | Performed by: FAMILY MEDICINE

## 2025-03-03 PROCEDURE — 1036F TOBACCO NON-USER: CPT | Performed by: FAMILY MEDICINE

## 2025-03-03 PROCEDURE — 3075F SYST BP GE 130 - 139MM HG: CPT | Performed by: FAMILY MEDICINE

## 2025-03-03 PROCEDURE — 99214 OFFICE O/P EST MOD 30 MIN: CPT | Performed by: FAMILY MEDICINE

## 2025-03-03 PROCEDURE — 3078F DIAST BP <80 MM HG: CPT | Performed by: FAMILY MEDICINE

## 2025-03-03 PROCEDURE — 1159F MED LIST DOCD IN RCRD: CPT | Performed by: FAMILY MEDICINE

## 2025-03-03 PROCEDURE — G8427 DOCREV CUR MEDS BY ELIG CLIN: HCPCS | Performed by: FAMILY MEDICINE

## 2025-03-03 PROCEDURE — G8417 CALC BMI ABV UP PARAM F/U: HCPCS | Performed by: FAMILY MEDICINE

## 2025-03-03 RX ORDER — HYDROCHLOROTHIAZIDE 25 MG/1
25 TABLET ORAL DAILY
Qty: 90 TABLET | Refills: 1 | Status: SHIPPED | OUTPATIENT
Start: 2025-03-03

## 2025-03-03 RX ORDER — SIMVASTATIN 20 MG
20 TABLET ORAL EVERY EVENING
Qty: 90 TABLET | Refills: 1 | Status: SHIPPED | OUTPATIENT
Start: 2025-03-03 | End: 2025-08-30

## 2025-03-03 RX ORDER — AMLODIPINE BESYLATE 10 MG/1
10 TABLET ORAL DAILY
Qty: 90 TABLET | Refills: 1 | Status: SHIPPED | OUTPATIENT
Start: 2025-03-03

## 2025-03-03 SDOH — ECONOMIC STABILITY: FOOD INSECURITY: WITHIN THE PAST 12 MONTHS, YOU WORRIED THAT YOUR FOOD WOULD RUN OUT BEFORE YOU GOT MONEY TO BUY MORE.: NEVER TRUE

## 2025-03-03 SDOH — ECONOMIC STABILITY: FOOD INSECURITY: WITHIN THE PAST 12 MONTHS, THE FOOD YOU BOUGHT JUST DIDN'T LAST AND YOU DIDN'T HAVE MONEY TO GET MORE.: NEVER TRUE

## 2025-03-03 ASSESSMENT — PATIENT HEALTH QUESTIONNAIRE - PHQ9
SUM OF ALL RESPONSES TO PHQ QUESTIONS 1-9: 0
2. FEELING DOWN, DEPRESSED OR HOPELESS: NOT AT ALL
SUM OF ALL RESPONSES TO PHQ QUESTIONS 1-9: 0
1. LITTLE INTEREST OR PLEASURE IN DOING THINGS: NOT AT ALL

## 2025-03-03 ASSESSMENT — ENCOUNTER SYMPTOMS
VOMITING: 0
COUGH: 0
DIARRHEA: 0

## 2025-03-03 NOTE — PROGRESS NOTES
Subjective:      Patient ID: Juan M Shultz is a 79 y.o. male    Hypertension  The current episode started more than 1 year ago. Past treatments include beta blockers, calcium channel blockers and diuretics. The current treatment provides moderate improvement.   Hyperlipidemia  The current episode started more than 1 year ago. The problem is controlled. Current antihyperlipidemic treatment includes statins. The current treatment provides moderate improvement of lipids.     Here in follow up for htn and lipids.  Here with family.  No missed doses of medications.  No changes with weight.   Would like podiatry referral for nail clipping    Review of Systems   Respiratory:  Negative for cough.    Gastrointestinal:  Negative for diarrhea and vomiting.   Musculoskeletal:  Positive for arthralgias.   Neurological:  Negative for weakness.     Reviewed allergy, medical, social, surgical, family and med list changes and updated   Files     Social History     Socioeconomic History    Marital status:      Spouse name: None    Number of children: None    Years of education: None    Highest education level: None   Tobacco Use    Smoking status: Former     Current packs/day: 0.00     Types: Cigarettes     Quit date: 1/3/2017     Years since quittin.1    Smokeless tobacco: Never    Tobacco comments:     couple cigarettes a day   Vaping Use    Vaping status: Never Used   Substance and Sexual Activity    Alcohol use: No     Comment: rarely- 1 beer monthly    Drug use: No    Sexual activity: Never     Social Determinants of Health     Financial Resource Strain: Low Risk  (2024)    Overall Financial Resource Strain (CARDIA)     Difficulty of Paying Living Expenses: Not hard at all   Food Insecurity: No Food Insecurity (3/3/2025)    Hunger Vital Sign     Worried About Running Out of Food in the Last Year: Never true     Ran Out of Food in the Last Year: Never true   Transportation Needs: No Transportation Needs

## 2025-03-06 ENCOUNTER — TELEPHONE (OUTPATIENT)
Age: 80
End: 2025-03-06

## 2025-03-12 ENCOUNTER — TELEPHONE (OUTPATIENT)
Age: 80
End: 2025-03-12

## 2025-04-14 DIAGNOSIS — E11.69 TYPE 2 DIABETES MELLITUS WITH OTHER SPECIFIED COMPLICATION, WITH LONG-TERM CURRENT USE OF INSULIN (HCC): ICD-10-CM

## 2025-04-14 DIAGNOSIS — Z79.4 TYPE 2 DIABETES MELLITUS WITH OTHER SPECIFIED COMPLICATION, WITH LONG-TERM CURRENT USE OF INSULIN (HCC): ICD-10-CM

## 2025-04-14 LAB
ANION GAP SERPL CALCULATED.3IONS-SCNC: 11 MEQ/L (ref 9–15)
BUN SERPL-MCNC: 27 MG/DL (ref 8–23)
CALCIUM SERPL-MCNC: 9.1 MG/DL (ref 8.5–9.9)
CHLORIDE SERPL-SCNC: 106 MEQ/L (ref 95–107)
CO2 SERPL-SCNC: 25 MEQ/L (ref 20–31)
CREAT SERPL-MCNC: 1.73 MG/DL (ref 0.7–1.2)
ESTIMATED AVERAGE GLUCOSE: 229 MG/DL
GLUCOSE SERPL-MCNC: 180 MG/DL (ref 70–99)
HBA1C MFR BLD: 9.6 % (ref 4–6)
POTASSIUM SERPL-SCNC: 5.1 MEQ/L (ref 3.4–4.9)
SODIUM SERPL-SCNC: 142 MEQ/L (ref 135–144)

## 2025-04-17 ENCOUNTER — OFFICE VISIT (OUTPATIENT)
Age: 80
End: 2025-04-17
Payer: COMMERCIAL

## 2025-04-17 VITALS
SYSTOLIC BLOOD PRESSURE: 170 MMHG | HEART RATE: 82 BPM | WEIGHT: 222 LBS | BODY MASS INDEX: 36.99 KG/M2 | HEIGHT: 65 IN | DIASTOLIC BLOOD PRESSURE: 74 MMHG

## 2025-04-17 DIAGNOSIS — E78.2 MIXED HYPERLIPIDEMIA: ICD-10-CM

## 2025-04-17 DIAGNOSIS — E11.69 TYPE 2 DIABETES MELLITUS WITH OTHER SPECIFIED COMPLICATION, WITH LONG-TERM CURRENT USE OF INSULIN: Primary | ICD-10-CM

## 2025-04-17 DIAGNOSIS — Z79.4 TYPE 2 DIABETES MELLITUS WITH OTHER SPECIFIED COMPLICATION, WITH LONG-TERM CURRENT USE OF INSULIN: Primary | ICD-10-CM

## 2025-04-17 LAB
CHP ED QC CHECK: ABNORMAL
GLUCOSE BLD-MCNC: 222 MG/DL

## 2025-04-17 PROCEDURE — 3046F HEMOGLOBIN A1C LEVEL >9.0%: CPT | Performed by: INTERNAL MEDICINE

## 2025-04-17 PROCEDURE — 82962 GLUCOSE BLOOD TEST: CPT | Performed by: INTERNAL MEDICINE

## 2025-04-17 PROCEDURE — 99213 OFFICE O/P EST LOW 20 MIN: CPT | Performed by: INTERNAL MEDICINE

## 2025-04-17 PROCEDURE — 3078F DIAST BP <80 MM HG: CPT | Performed by: INTERNAL MEDICINE

## 2025-04-17 PROCEDURE — G8427 DOCREV CUR MEDS BY ELIG CLIN: HCPCS | Performed by: INTERNAL MEDICINE

## 2025-04-17 PROCEDURE — 3077F SYST BP >= 140 MM HG: CPT | Performed by: INTERNAL MEDICINE

## 2025-04-17 PROCEDURE — 1123F ACP DISCUSS/DSCN MKR DOCD: CPT | Performed by: INTERNAL MEDICINE

## 2025-04-17 PROCEDURE — 1159F MED LIST DOCD IN RCRD: CPT | Performed by: INTERNAL MEDICINE

## 2025-04-17 PROCEDURE — G8417 CALC BMI ABV UP PARAM F/U: HCPCS | Performed by: INTERNAL MEDICINE

## 2025-04-17 PROCEDURE — 1036F TOBACCO NON-USER: CPT | Performed by: INTERNAL MEDICINE

## 2025-04-17 PROCEDURE — 1126F AMNT PAIN NOTED NONE PRSNT: CPT | Performed by: INTERNAL MEDICINE

## 2025-04-17 RX ORDER — SIMVASTATIN 20 MG
20 TABLET ORAL EVERY EVENING
Qty: 90 TABLET | Refills: 1 | Status: SHIPPED | OUTPATIENT
Start: 2025-04-17 | End: 2025-10-14

## 2025-04-17 RX ORDER — INSULIN LISPRO 100 [IU]/ML
INJECTION, SUSPENSION SUBCUTANEOUS
Qty: 45 ML | Refills: 3 | Status: SHIPPED | OUTPATIENT
Start: 2025-04-17

## 2025-04-17 RX ORDER — PANTOPRAZOLE SODIUM 40 MG/1
40 TABLET, DELAYED RELEASE ORAL DAILY
Qty: 90 TABLET | Refills: 1 | Status: SHIPPED | OUTPATIENT
Start: 2025-04-17

## 2025-04-17 RX ORDER — CARVEDILOL 12.5 MG/1
12.5 TABLET ORAL 2 TIMES DAILY
COMMUNITY
Start: 2025-02-03

## 2025-04-17 NOTE — PROGRESS NOTES
4/17/2025    Assessment:       Diagnosis Orders   1. Type 2 diabetes mellitus with other specified complication, with long-term current use of insulin  POCT Glucose    Basic Metabolic Panel    Hemoglobin A1C    Albumin/Creatinine Ratio, Urine    insulin lispro protamine & lispro (HUMALOG MIX 75/25 KWIKPEN) (75-25) 100 UNIT per ML SUPN injection pen    Lipid Panel      2. Mixed hyperlipidemia              PLAN:     Orders Placed This Encounter   Procedures    Basic Metabolic Panel     Standing Status:   Future     Expected Date:   4/17/2025     Expiration Date:   4/17/2026    Hemoglobin A1C     Standing Status:   Future     Expected Date:   4/17/2025     Expiration Date:   4/17/2026    Albumin/Creatinine Ratio, Urine     Standing Status:   Future     Expected Date:   4/17/2025     Expiration Date:   4/17/2026    POCT Glucose     Orders Placed This Encounter   Medications    simvastatin (ZOCOR) 20 MG tablet     Sig: Take 1 tablet by mouth every evening     Dispense:  90 tablet     Refill:  1     ZERO refills remain on this prescription. Your patient is requesting advance approval of refills for this medication to PREVENT ANY MISSED DOSES    pantoprazole (PROTONIX) 40 MG tablet     Sig: Take 1 tablet by mouth daily     Dispense:  90 tablet     Refill:  1    insulin lispro protamine & lispro (HUMALOG MIX 75/25 KWIKPEN) (75-25) 100 UNIT per ML SUPN injection pen     Sig: INJECT 30 UNITS SUBCUTANEOUSLY EVERY MORNING AND INJECT 30 UNITS SUBCUTANEOUSLY EVERY EVENING WITH DINNER     Dispense:  45 mL     Refill:  3   Continue current insulin regimen patient to follow-up in 3 to 6 months time A1c goal of less than 8          Orders Placed This Encounter   Procedures    POCT Glucose     No orders of the defined types were placed in this encounter.    No follow-ups on file.  Subjective:     Chief Complaint   Patient presents with    Diabetes     Vitals:    04/17/25 1618   Height: 1.651 m (5' 5\")     Wt Readings from Last 3

## 2025-04-18 ASSESSMENT — ENCOUNTER SYMPTOMS: EYES NEGATIVE: 1

## 2025-05-05 RX ORDER — FLUTICASONE PROPIONATE 50 MCG
2 SPRAY, SUSPENSION (ML) NASAL DAILY
Qty: 48 G | Refills: 1 | Status: SHIPPED | OUTPATIENT
Start: 2025-05-05

## 2025-05-19 ENCOUNTER — INITIAL CONSULT (OUTPATIENT)
Age: 80
End: 2025-05-19
Payer: COMMERCIAL

## 2025-05-19 VITALS — HEIGHT: 65 IN | TEMPERATURE: 97.4 F | BODY MASS INDEX: 36.99 KG/M2 | WEIGHT: 222 LBS

## 2025-05-19 DIAGNOSIS — M21.621 TAILOR'S BUNIONETTE, BILATERAL: ICD-10-CM

## 2025-05-19 DIAGNOSIS — M79.671 BILATERAL FOOT PAIN: Primary | ICD-10-CM

## 2025-05-19 DIAGNOSIS — M21.622 TAILOR'S BUNIONETTE, BILATERAL: ICD-10-CM

## 2025-05-19 DIAGNOSIS — M79.672 BILATERAL FOOT PAIN: Primary | ICD-10-CM

## 2025-05-19 DIAGNOSIS — B35.1 DERMATOPHYTOSIS OF NAIL: ICD-10-CM

## 2025-05-19 DIAGNOSIS — M20.12 HALLUX ABDUCTO VALGUS, BILATERAL: ICD-10-CM

## 2025-05-19 DIAGNOSIS — M20.11 HALLUX ABDUCTO VALGUS, BILATERAL: ICD-10-CM

## 2025-05-19 DIAGNOSIS — M20.41 HAMMER TOES, BILATERAL: ICD-10-CM

## 2025-05-19 DIAGNOSIS — M20.42 HAMMER TOES, BILATERAL: ICD-10-CM

## 2025-05-19 DIAGNOSIS — E11.42 DIABETIC POLYNEUROPATHY ASSOCIATED WITH TYPE 2 DIABETES MELLITUS (HCC): ICD-10-CM

## 2025-05-19 PROCEDURE — G8427 DOCREV CUR MEDS BY ELIG CLIN: HCPCS | Performed by: PODIATRIST

## 2025-05-19 PROCEDURE — 1123F ACP DISCUSS/DSCN MKR DOCD: CPT | Performed by: PODIATRIST

## 2025-05-19 PROCEDURE — G8417 CALC BMI ABV UP PARAM F/U: HCPCS | Performed by: PODIATRIST

## 2025-05-19 PROCEDURE — 3046F HEMOGLOBIN A1C LEVEL >9.0%: CPT | Performed by: PODIATRIST

## 2025-05-19 PROCEDURE — 1160F RVW MEDS BY RX/DR IN RCRD: CPT | Performed by: PODIATRIST

## 2025-05-19 PROCEDURE — 99203 OFFICE O/P NEW LOW 30 MIN: CPT | Performed by: PODIATRIST

## 2025-05-19 PROCEDURE — 11721 DEBRIDE NAIL 6 OR MORE: CPT | Performed by: PODIATRIST

## 2025-05-19 PROCEDURE — 1159F MED LIST DOCD IN RCRD: CPT | Performed by: PODIATRIST

## 2025-05-19 PROCEDURE — 1036F TOBACCO NON-USER: CPT | Performed by: PODIATRIST

## 2025-05-19 PROCEDURE — 1126F AMNT PAIN NOTED NONE PRSNT: CPT | Performed by: PODIATRIST

## 2025-05-19 ASSESSMENT — ENCOUNTER SYMPTOMS
NAUSEA: 0
SHORTNESS OF BREATH: 0
VOMITING: 0
BACK PAIN: 1

## 2025-05-19 NOTE — PROGRESS NOTES
[facility] [place] [facility], [facility]  [address] [place] [address]  Dept: [phone]  Loc: [phone]       Juan M Shultz  (1945)    5/19/25    Subjective     Juan M Shultz is 79 y.o. male who complains today of:    Chief Complaint   Patient presents with    Diabetes    Nail Problem     Both feet       Diabetes  Pertinent negatives for diabetes include no chest pain.       History of Present Illness  The patient presents for a diabetic foot exam and complains of painful toenails on both feet. He is unable to trim the nails himself and was referred by his primary care provider, Jewel Barraza MD.     He reports experiencing numbness and tingling in his feet, indicative of neuropathy. The neuropathy extends throughout his feet. He does not utilize diabetic footwear and has no history of wearing such shoes. He monitors his blood glucose levels twice daily, with a morning reading of 147. He reports no presence of any scars or or history of diabetic wounds on his feet.  Patient uses insulin.  Patient's most recent hemoglobin A1c was 9.6%.    Review of Systems   Constitutional:  Negative for chills and fever.   HENT:  Negative for hearing loss.    Respiratory:  Negative for shortness of breath.    Cardiovascular:  Negative for chest pain.   Gastrointestinal:  Negative for nausea and vomiting.   Genitourinary:  Negative for difficulty urinating.   Musculoskeletal:  Positive for arthralgias and back pain. Negative for gait problem.   Skin:  Negative for wound.   Neurological:  Positive for numbness.   Hematological:  Does not bruise/bleed easily.   Psychiatric/Behavioral:  Negative for sleep disturbance.        The patient is a diabetic.   PCP/ Endocrinologist: Lew Barron MD   Date last seen: April 17, 2025    Allergies:  Glipizide, Propoxyphene, and Propoxyphene and methadone [methadone]    Current Outpatient Medications on File

## 2025-06-02 ENCOUNTER — TELEPHONE (OUTPATIENT)
Age: 80
End: 2025-06-02

## 2025-06-02 DIAGNOSIS — E11.69 TYPE 2 DIABETES MELLITUS WITH OTHER SPECIFIED COMPLICATION, WITH LONG-TERM CURRENT USE OF INSULIN (HCC): ICD-10-CM

## 2025-06-02 DIAGNOSIS — Z79.4 TYPE 2 DIABETES MELLITUS WITH OTHER SPECIFIED COMPLICATION, WITH LONG-TERM CURRENT USE OF INSULIN (HCC): ICD-10-CM

## 2025-06-02 RX ORDER — GABAPENTIN 300 MG/1
CAPSULE ORAL
Qty: 180 CAPSULE | Refills: 1 | Status: SHIPPED | OUTPATIENT
Start: 2025-06-02 | End: 2025-10-25

## 2025-06-02 NOTE — TELEPHONE ENCOUNTER
Pt's daughter is requesting a refill of Gabapentin, which is .    If okay with that send Rx to [facility] [address] in [place]

## 2025-06-24 RX ORDER — CARVEDILOL 6.25 MG/1
6.25 TABLET ORAL DAILY
Qty: 180 TABLET | Refills: 1 | OUTPATIENT
Start: 2025-06-24

## 2025-08-25 DIAGNOSIS — I10 HYPERTENSION, UNSPECIFIED TYPE: Chronic | ICD-10-CM

## 2025-08-25 RX ORDER — AMLODIPINE BESYLATE 10 MG/1
10 TABLET ORAL DAILY
Qty: 90 TABLET | Refills: 0 | Status: SHIPPED | OUTPATIENT
Start: 2025-08-25

## 2025-08-25 RX ORDER — HYDROCHLOROTHIAZIDE 25 MG/1
25 TABLET ORAL DAILY
Qty: 90 TABLET | Refills: 0 | Status: SHIPPED | OUTPATIENT
Start: 2025-08-25

## 2025-08-28 DIAGNOSIS — E78.00 HYPERCHOLESTEREMIA: ICD-10-CM

## 2025-08-28 DIAGNOSIS — I10 HYPERTENSION, UNSPECIFIED TYPE: Chronic | ICD-10-CM

## 2025-08-28 LAB
ALBUMIN SERPL-MCNC: 4.3 G/DL (ref 3.5–4.6)
ALP SERPL-CCNC: 118 U/L (ref 35–104)
ALT SERPL-CCNC: 13 U/L (ref 0–41)
ANION GAP SERPL CALCULATED.3IONS-SCNC: 11 MEQ/L (ref 9–15)
AST SERPL-CCNC: 12 U/L (ref 0–40)
BASOPHILS # BLD: 0.1 K/UL (ref 0–0.2)
BASOPHILS NFR BLD: 0.7 %
BILIRUB SERPL-MCNC: 0.4 MG/DL (ref 0.2–0.7)
BUN SERPL-MCNC: 33 MG/DL (ref 8–23)
CALCIUM SERPL-MCNC: 9.6 MG/DL (ref 8.5–9.9)
CHLORIDE SERPL-SCNC: 102 MEQ/L (ref 95–107)
CHOLEST SERPL-MCNC: 195 MG/DL (ref 0–199)
CO2 SERPL-SCNC: 27 MEQ/L (ref 20–31)
CREAT SERPL-MCNC: 1.45 MG/DL (ref 0.7–1.2)
EOSINOPHIL # BLD: 0.2 K/UL (ref 0–0.7)
EOSINOPHIL NFR BLD: 3.3 %
ERYTHROCYTE [DISTWIDTH] IN BLOOD BY AUTOMATED COUNT: 13.5 % (ref 11.5–14.5)
GLOBULIN SER CALC-MCNC: 3.6 G/DL (ref 2.3–3.5)
GLUCOSE SERPL-MCNC: 122 MG/DL (ref 70–99)
HCT VFR BLD AUTO: 40.4 % (ref 42–52)
HDLC SERPL-MCNC: 40 MG/DL (ref 40–59)
HGB BLD-MCNC: 13 G/DL (ref 14–18)
LDLC SERPL CALC-MCNC: 87 MG/DL (ref 0–129)
LYMPHOCYTES # BLD: 1.6 K/UL (ref 1–4.8)
LYMPHOCYTES NFR BLD: 22.4 %
MCH RBC QN AUTO: 29 PG (ref 27–31.3)
MCHC RBC AUTO-ENTMCNC: 32.2 % (ref 33–37)
MCV RBC AUTO: 90 FL (ref 79–92.2)
MONOCYTES # BLD: 0.7 K/UL (ref 0.2–0.8)
MONOCYTES NFR BLD: 9.2 %
NEUTROPHILS # BLD: 4.5 K/UL (ref 1.4–6.5)
NEUTS SEG NFR BLD: 63.3 %
PLATELET # BLD AUTO: 195 K/UL (ref 130–400)
POTASSIUM SERPL-SCNC: 5 MEQ/L (ref 3.4–4.9)
PROT SERPL-MCNC: 7.9 G/DL (ref 6.3–8)
RBC # BLD AUTO: 4.49 M/UL (ref 4.7–6.1)
SODIUM SERPL-SCNC: 140 MEQ/L (ref 135–144)
TRIGL SERPL-MCNC: 339 MG/DL (ref 0–150)
WBC # BLD AUTO: 7 K/UL (ref 4.8–10.8)

## 2025-08-29 ENCOUNTER — OFFICE VISIT (OUTPATIENT)
Age: 80
End: 2025-08-29

## 2025-08-29 VITALS — WEIGHT: 220 LBS | TEMPERATURE: 98 F | HEIGHT: 66 IN | BODY MASS INDEX: 35.36 KG/M2

## 2025-08-29 DIAGNOSIS — M79.671 BILATERAL FOOT PAIN: ICD-10-CM

## 2025-08-29 DIAGNOSIS — E11.42 DIABETIC POLYNEUROPATHY ASSOCIATED WITH TYPE 2 DIABETES MELLITUS (HCC): Primary | ICD-10-CM

## 2025-08-29 DIAGNOSIS — M79.672 BILATERAL FOOT PAIN: ICD-10-CM

## 2025-08-29 DIAGNOSIS — B35.1 DERMATOPHYTOSIS OF NAIL: ICD-10-CM

## 2025-08-30 ASSESSMENT — ENCOUNTER SYMPTOMS
NAUSEA: 0
SHORTNESS OF BREATH: 0
VOMITING: 0
BACK PAIN: 1

## 2025-09-03 ENCOUNTER — OFFICE VISIT (OUTPATIENT)
Age: 80
End: 2025-09-03
Payer: COMMERCIAL

## 2025-09-03 VITALS
SYSTOLIC BLOOD PRESSURE: 130 MMHG | BODY MASS INDEX: 35.2 KG/M2 | OXYGEN SATURATION: 98 % | HEART RATE: 70 BPM | DIASTOLIC BLOOD PRESSURE: 80 MMHG | WEIGHT: 219 LBS | HEIGHT: 66 IN | TEMPERATURE: 97.2 F

## 2025-09-03 DIAGNOSIS — I10 HYPERTENSION, UNSPECIFIED TYPE: Primary | ICD-10-CM

## 2025-09-03 DIAGNOSIS — C61 PROSTATE CA (HCC): ICD-10-CM

## 2025-09-03 DIAGNOSIS — E78.2 MIXED HYPERLIPIDEMIA: ICD-10-CM

## 2025-09-03 PROCEDURE — G8427 DOCREV CUR MEDS BY ELIG CLIN: HCPCS | Performed by: FAMILY MEDICINE

## 2025-09-03 PROCEDURE — 99214 OFFICE O/P EST MOD 30 MIN: CPT | Performed by: FAMILY MEDICINE

## 2025-09-03 PROCEDURE — 1123F ACP DISCUSS/DSCN MKR DOCD: CPT | Performed by: FAMILY MEDICINE

## 2025-09-03 PROCEDURE — 1160F RVW MEDS BY RX/DR IN RCRD: CPT | Performed by: FAMILY MEDICINE

## 2025-09-03 PROCEDURE — 3075F SYST BP GE 130 - 139MM HG: CPT | Performed by: FAMILY MEDICINE

## 2025-09-03 PROCEDURE — 1159F MED LIST DOCD IN RCRD: CPT | Performed by: FAMILY MEDICINE

## 2025-09-03 PROCEDURE — 3079F DIAST BP 80-89 MM HG: CPT | Performed by: FAMILY MEDICINE

## 2025-09-03 PROCEDURE — 1036F TOBACCO NON-USER: CPT | Performed by: FAMILY MEDICINE

## 2025-09-03 PROCEDURE — G8417 CALC BMI ABV UP PARAM F/U: HCPCS | Performed by: FAMILY MEDICINE

## 2025-09-03 RX ORDER — CALCITRIOL 0.25 UG/1
CAPSULE, LIQUID FILLED ORAL
COMMUNITY
Start: 2025-09-02

## 2025-09-03 RX ORDER — DAPAGLIFLOZIN 10 MG/1
10 TABLET, FILM COATED ORAL DAILY
COMMUNITY
Start: 2025-08-03

## 2025-09-03 RX ORDER — OMEGA-3-ACID ETHYL ESTERS 1 G/1
2 CAPSULE, LIQUID FILLED ORAL 2 TIMES DAILY
Qty: 360 CAPSULE | Refills: 0 | Status: SHIPPED | OUTPATIENT
Start: 2025-09-03

## 2025-09-03 ASSESSMENT — ENCOUNTER SYMPTOMS
DIARRHEA: 0
VOMITING: 0
COUGH: 0

## (undated) DEVICE — MAX-CORE® DISPOSABLE CORE BIOPSY INSTRUMENT, 18G X 20CM: Brand: MAX-CORE

## (undated) DEVICE — PAD N ADH W3XL4IN POLY COT SFT PERF FLM EASILY CUT ABSRB

## (undated) DEVICE — CONMED SCOPE SAVER BITE BLOCK, 20X27 MM: Brand: SCOPE SAVER

## (undated) DEVICE — ENDO CARRY-ON PROCEDURE KIT: Brand: ENDO CARRY-ON PROCEDURE KIT

## (undated) DEVICE — SINGLE PORT MANIFOLD: Brand: NEPTUNE 2

## (undated) DEVICE — GLOVE ORANGE PI 8 1/2   MSG9085

## (undated) DEVICE — BRUSH ENDO CLN L90.5IN SHTH DIA1.7MM BRIST DIA5-7MM 2-6MM

## (undated) DEVICE — STERILE LATEX POWDER-FREE SURGICAL GLOVESWITH NITRILE COATING: Brand: PROTEXIS

## (undated) DEVICE — TUBING IRRIGATION 140/160/180/190 SER GI ENDOSCP SMARTCAP

## (undated) DEVICE — TOWEL,OR,DSP,ST,BLUE,STD,4/PK,20PK/CS: Brand: MEDLINE

## (undated) DEVICE — APPLICATOR PVP IOD SWABSTK MED NS LF

## (undated) DEVICE — TUBING, SUCTION, 1/4" X 10', STRAIGHT: Brand: MEDLINE

## (undated) DEVICE — DEVICE BX 18 GAX21 CM EZ COR

## (undated) DEVICE — X-RAY DETECTABLE SPONGES,16 PLY: Brand: VISTEC

## (undated) DEVICE — TUBE SET 96 MM 64 MM H2O PERISTALTIC STD AUX CHANNEL

## (undated) DEVICE — DISCONTINUED USE 393278 SYRINGE 10 ML HYPO W/O NDL LL TP PLSTC ST

## (undated) DEVICE — LABEL MED MED CHPOR

## (undated) DEVICE — COVER,TABLE,44X90,STERILE: Brand: MEDLINE